# Patient Record
Sex: MALE | Race: WHITE | NOT HISPANIC OR LATINO | Employment: OTHER | URBAN - METROPOLITAN AREA
[De-identification: names, ages, dates, MRNs, and addresses within clinical notes are randomized per-mention and may not be internally consistent; named-entity substitution may affect disease eponyms.]

---

## 2017-03-23 ENCOUNTER — OFFICE VISIT - HEALTHEAST (OUTPATIENT)
Dept: INTERNAL MEDICINE | Facility: CLINIC | Age: 62
End: 2017-03-23

## 2017-03-23 ENCOUNTER — TRANSFERRED RECORDS (OUTPATIENT)
Dept: HEALTH INFORMATION MANAGEMENT | Facility: CLINIC | Age: 62
End: 2017-03-23

## 2017-03-23 DIAGNOSIS — I25.10 CORONARY ATHEROSCLEROSIS: ICD-10-CM

## 2017-03-23 DIAGNOSIS — H71.91 CHOLESTEATOMA, RIGHT: ICD-10-CM

## 2017-03-23 DIAGNOSIS — C64.9 MALIGNANT NEOPLASM OF KIDNEY EXCLUDING RENAL PELVIS (H): ICD-10-CM

## 2017-03-23 DIAGNOSIS — Z12.5 SPECIAL SCREENING FOR MALIGNANT NEOPLASM OF PROSTATE: ICD-10-CM

## 2017-03-23 DIAGNOSIS — Z00.00 ROUTINE GENERAL MEDICAL EXAMINATION AT A HEALTH CARE FACILITY: ICD-10-CM

## 2017-03-23 LAB
CHOLEST SERPL-MCNC: 114 MG/DL
FASTING STATUS PATIENT QL REPORTED: YES
HDLC SERPL-MCNC: 46 MG/DL
LDLC SERPL CALC-MCNC: 61 MG/DL
PSA SERPL-MCNC: 0.2 NG/ML (ref 0–4.5)
TRIGL SERPL-MCNC: 37 MG/DL

## 2017-03-23 ASSESSMENT — MIFFLIN-ST. JEOR: SCORE: 1634.02

## 2017-03-28 ENCOUNTER — PRE VISIT (OUTPATIENT)
Dept: OTOLARYNGOLOGY | Facility: CLINIC | Age: 62
End: 2017-03-28

## 2017-03-28 NOTE — TELEPHONE ENCOUNTER
1.  Date/reason for appt: 5/11/17 9:30AM - Lump in Ear & Hearing Loss  2.  Referring provider: self-referred  3.  Call to patient (Yes / No - short description): Yes, pt's wife Morena was transferred from call center  4.  Previous care at / records requested from:  [facility] -- NEED GABRIEL    Emailed GABRIEL form to pt's wife Morena: [email or web address]      ** Per pt's wife, lump was discovered at pt's last physical appt with Dr. Baltazar Tamayo just a few weeks ago.

## 2017-03-30 NOTE — TELEPHONE ENCOUNTER
Records received from [facility].   Included  Office notes: 3/23/17  Other: labs

## 2017-04-11 ENCOUNTER — RECORDS - HEALTHEAST (OUTPATIENT)
Dept: ADMINISTRATIVE | Facility: OTHER | Age: 62
End: 2017-04-11

## 2017-04-13 ENCOUNTER — COMMUNICATION - HEALTHEAST (OUTPATIENT)
Dept: INTERNAL MEDICINE | Facility: CLINIC | Age: 62
End: 2017-04-13

## 2017-04-13 DIAGNOSIS — N52.9 ED (ERECTILE DYSFUNCTION): ICD-10-CM

## 2017-04-18 ENCOUNTER — COMMUNICATION - HEALTHEAST (OUTPATIENT)
Dept: ADMINISTRATIVE | Facility: CLINIC | Age: 62
End: 2017-04-18

## 2017-04-24 ENCOUNTER — COMMUNICATION - HEALTHEAST (OUTPATIENT)
Dept: INTERNAL MEDICINE | Facility: CLINIC | Age: 62
End: 2017-04-24

## 2017-04-24 DIAGNOSIS — N52.9 ED (ERECTILE DYSFUNCTION): ICD-10-CM

## 2017-04-25 ENCOUNTER — AMBULATORY - HEALTHEAST (OUTPATIENT)
Dept: INTERNAL MEDICINE | Facility: CLINIC | Age: 62
End: 2017-04-25

## 2017-05-09 DIAGNOSIS — H91.90 HEARING LOSS: Primary | ICD-10-CM

## 2017-05-10 DIAGNOSIS — H90.8 MIXED HEARING LOSS: Primary | ICD-10-CM

## 2017-05-11 ENCOUNTER — OFFICE VISIT (OUTPATIENT)
Dept: OTOLARYNGOLOGY | Facility: CLINIC | Age: 62
End: 2017-05-11
Attending: OTOLARYNGOLOGY

## 2017-05-11 ENCOUNTER — OFFICE VISIT (OUTPATIENT)
Dept: AUDIOLOGY | Facility: CLINIC | Age: 62
End: 2017-05-11
Attending: OTOLARYNGOLOGY

## 2017-05-11 VITALS — BODY MASS INDEX: 27.25 KG/M2 | WEIGHT: 184 LBS | HEIGHT: 69 IN

## 2017-05-11 DIAGNOSIS — H90.6 MIXED HEARING LOSS, BILATERAL: ICD-10-CM

## 2017-05-11 DIAGNOSIS — H90.72 MIXED HEARING LOSS OF LEFT EAR: Primary | ICD-10-CM

## 2017-05-11 DIAGNOSIS — H90.5 SENSORINEURAL HEARING LOSS OF RIGHT EAR: ICD-10-CM

## 2017-05-11 DIAGNOSIS — H61.23 BILATERAL IMPACTED CERUMEN: Primary | ICD-10-CM

## 2017-05-11 ASSESSMENT — PAIN SCALES - GENERAL: PAINLEVEL: NO PAIN (0)

## 2017-05-11 NOTE — MR AVS SNAPSHOT
After Visit Summary   5/11/2017    Jose Lynn    MRN: [id number]           Patient Information     Date Of Birth          1955        Visit Information        Provider Department      5/11/2017 9:30 AM Amarjit Jordan MD [facility] Ear Nose and Throat        Today's Diagnoses     Bilateral impacted cerumen    -  1    Mixed hearing loss, bilateral          Care Instructions    The patient presents with a history of hearing loss and opacification of the right tympanic membrane. The patient will referred for a CT scan of the temporal bone to evaluate the right middle ear and he will be referred for hearing amplification as he feels necessary in the right ear. He will be seen again after his CT scan is completed. His ears are cleaned of cerumen today.         Follow-ups after your visit        Your next 10 appointments already scheduled     May 11, 2017 10:40 AM CDT   CT TEMPORAL ORBITAL SELLA W/O CONTRAST with UCCT1   [facility] CT ([facility])    [address] [place] [id number] [phone]           Please bring any scans or X-rays taken at other hospitals, if similar tests were done. Also bring a list of your medicines, including vitamins, minerals and over-the-counter drugs. It is safest to leave personal items at home.  Be sure to tell your doctor:   If you have any allergies.   If there s any chance you are pregnant.   If you are breastfeeding.   If you have any special needs.  You do not need to do anything special to prepare.  Please wear loose clothing, such as a sweat suit or jogging clothes. Avoid snaps, zippers and other metal. We may ask you to undress and put on a hospital gown.            May 25, 2017  7:45 AM CDT   (Arrive by 7:30 AM)   Return Visit with Amarjit Jordan MD   [facility] ([facility])    [address] [place] [address]  [id number] [phone]              Future tests that were ordered for you today     Open Future Orders        Priority Expected Expires Ordered    CT Temporal orbital sella w/o contrast Routine  2018            Who to contact     Please call your clinic at [phone] to:    Ask questions about your health    Make or cancel appointments    Discuss your medicines    Learn about your test results    Speak to your doctor   If you have compliments or concerns about an experience at your clinic, or if you wish to file a complaint, please contact [facility] Patient Relations at [phone] or email us at [email or web address]         Additional Information About Your Visit        NoteSickhart Information     YooDeal is an electronic gateway that provides easy, online access to your medical records. With YooDeal, you can request a clinic appointment, read your test results, renew a prescription or communicate with your care team.     To sign up for YooDeal visit the website at [email or web address]   You will be asked to enter the access code listed below, as well as some personal information. Please follow the directions to create your username and password.     Your access code is: [email or web address]  Expires: 2017  6:30 AM     Your access code will  in 90 days. If you need help or a new code, please contact your [facility] or call [phone] for assistance.        Care EveryWhere ID     This is your Care EveryWhere ID. This could be used by other organizations to access your [facility] medical records  [id number]        Your Vitals Were     Height BMI (Body Mass Index)                1.75 m (5' 8.9\") 27.25 kg/m2           Blood Pressure from Last 3 Encounters:   No data found for BP    Weight from Last 3 Encounters:   17 83.5 kg (184 lb)              We Performed the Following     AUDIO REVIEW/CONSULT        Primary " Care Provider    Referred MD Brandon       No address on file        Thank you!     Thank you for choosing [facility] EAR NOSE AND THROAT  for your care. Our goal is always to provide you with excellent care. Hearing back from our patients is one way we can continue to improve our services. Please take a few minutes to complete the written survey that you may receive in the mail after your visit with us. Thank you!             Your Updated Medication List - Protect others around you: Learn how to safely use, store and throw away your medicines at www.disposemymeds.org.          This list is accurate as of: 5/11/17 10:31 AM.  Always use your most recent med list.                   Brand Name Dispense Instructions for use    ASPIRIN EC PO      Take 162 mg by mouth daily       LIPITOR PO      Take 80 mg by mouth daily       LISINOPRIL PO      Take 5 mg by mouth daily       Multi-vitamin Tabs tablet      Take 1 tablet by mouth daily       NITROSTAT SL      Place 0.4 mg under the tongue       vitamin E 3000 UNIT/GM Crea

## 2017-05-11 NOTE — LETTER
Date:May 12, 2017      Patient was self referred, no letter generated. Do not send.        [facility] Health Information

## 2017-05-11 NOTE — LETTER
5/11/2017       RE: Jose Lynn  [address]  [place] [address]     Dear Colleague,    Thank you for referring your patient, Jose Lynn, to the [facility] EAR NOSE AND THROAT at [facility]. Please see a copy of my visit note below.    The patient presents with a history of hearing loss.  The patient reports a progressive hearing loss in both ears over at least the past few years.  The patient denies ear infections, otalgia, otorrhea, dizziness, or tinnitus.  The patient has no family history of hearing loss or otosclerosis.  The patient denies sinusitis, rhinitis, facial pain, nasal obstruction or purulent nasal discharge. The patient denies chronic or recurrent tonsillitis, chronic or recurrent pharyngitis.    The patient's Audiogram and Tympanogram are reviewed with him and they demonstrates mild to moderate sensorineural hearing loss worse in the right ear than in the left ear with 100% word recognition on the left and 96% word recognition on the right with slight negative pressure in the right ear on his tympanogram.     This patient is seen in consultation as a self referral to my clinic.     All other systems were reviewed and they are either negative or they are not directly pertinent to this Otolaryngology examination.      Past Medical History:    No past medical history on file.    Past Surgical History:    No past surgical history on file.    Medications:      Current Outpatient Prescriptions:      ASPIRIN EC PO, Take 162 mg by mouth daily, Disp: , Rfl:      LISINOPRIL PO, Take 5 mg by mouth daily, Disp: , Rfl:      Atorvastatin Calcium (LIPITOR PO), Take 80 mg by mouth daily, Disp: , Rfl:      multivitamin, therapeutic with minerals (MULTI-VITAMIN) TABS, Take 1 tablet by mouth daily, Disp: , Rfl:      vitamin E 3000 UNIT/GM CREA, , Disp: , Rfl:      Nitroglycerin (NITROSTAT SL), Place 0.4 mg under the tongue, Disp: , Rfl:     Allergies:    Review of patient's  allergies indicates no known allergies.    Physical Examination:    The patient is a well developed, well nourished male in no apparent distress.  He is normocephalic, atraumatic with pupils equally round and reactive to light.    Oral Cavity Examination:  Normal mucosa with no masses or lesions  Nasal Examination: Normal mucosa with no masses or lesions  Ear Examination: Ear canals are impacted with cerumen. The ear canals are cleaned of cerumen using an alligator forceps, a currette and a suction using a binocular microscope for visualization. The tympanic membrane on the left is normal and on the right is sclerotic. The middle ear space on the left is normal and on the right the middle ear cannot be assessed due to the sclerosis of the tympanic membrane.   Neurological Examination: Facial nerve function intact and symmetric  Integumentary Examination: No lesions on the skin of the head and neck  Neck Examination: No masses or lesions, no lymphadenopathy  Endocrine Examination: Normal thyroid examination    Assessment and Plan:    The patient presents with a history of hearing loss and opacification of the right tympanic membrane. The patient will referred for a CT scan of the temporal bone to evaluate the right middle ear and he will be referred for hearing amplification as he feels necessary in the right ear. He will be seen again after his CT scan is completed. His ears are cleaned of cerumen today.     Again, thank you for allowing me to participate in the care of your patient.      Sincerely,    Amarjit Jordan MD

## 2017-05-11 NOTE — PROGRESS NOTES
AUDIOLOGY REPORT    SUMMARY: Audiology visit completed. See audiogram for results.      RECOMMENDATIONS: Follow-up with ENT.    Gillian Hurst M.A.  Audiology Extern  Temporary License #[id number]    I was present with the patient for the entire Audiology appointment including all procedures/testing performed by the AuD student, and agree with the student s assessment and plan as documented.      Jaxson Munoz, [facility]  Licensed Audiologist  [place] #[id number]

## 2017-05-11 NOTE — NURSING NOTE
Chief Complaint   Patient presents with     Consult     growth in ear unspecified.     Chun Broussard LPN

## 2017-05-11 NOTE — PATIENT INSTRUCTIONS
The patient presents with a history of hearing loss and opacification of the right tympanic membrane. The patient will referred for a CT scan of the temporal bone to evaluate the right middle ear and he will be referred for hearing amplification as he feels necessary in the right ear. He will be seen again after his CT scan is completed. His ears are cleaned of cerumen today.

## 2017-05-11 NOTE — MR AVS SNAPSHOT
After Visit Summary   2017    Jose Lynn    MRN: [id number]           Patient Information     Date Of Birth          1955        Visit Information        Provider Department      2017 8:30 AM Jenny Milian AuD M [facility] Audiology        Today's Diagnoses     Mixed hearing loss of left ear    -  1    Sensorineural hearing loss of right ear           Follow-ups after your visit        Who to contact     Please call your clinic at [phone] to:    Ask questions about your health    Make or cancel appointments    Discuss your medicines    Learn about your test results    Speak to your doctor   If you have compliments or concerns about an experience at your clinic, or if you wish to file a complaint, please contact [facility] Patient Relations at [phone] or email us at [email or web address]         Additional Information About Your Visit        MyChart Information     Buyout is an electronic gateway that provides easy, online access to your medical records. With Immunome, you can request a clinic appointment, read your test results, renew a prescription or communicate with your care team.     To sign up for Buyout visit the website at [email or web address]   You will be asked to enter the access code listed below, as well as some personal information. Please follow the directions to create your username and password.     Your access code is: [email or web address]  Expires: 2017  6:30 AM     Your access code will  in 90 days. If you need help or a new code, please contact your [facility] or call [phone] for assistance.        Care EveryWhere ID     This is your Care EveryWhere ID. This could be used by other organizations to access your [facility] medical records  [id number]         Blood Pressure from Last 3 Encounters:   No data found for BP    Weight from Last 3 Encounters:   17 83.5 kg (184  lb)              We Performed the Following     [facility] Audiometry Thrshld Eval & Speech Recog (50570)     [place]   ([id number])     Tymps / Reflex   (11741)        Primary Care Provider    Referred Self, MD       No address on file        Thank you!     Thank you for choosing [facility] AUDIOLOGY  for your care. Our goal is always to provide you with excellent care. Hearing back from our patients is one way we can continue to improve our services. Please take a few minutes to complete the written survey that you may receive in the mail after your visit with us. Thank you!             Your Updated Medication List - Protect others around you: Learn how to safely use, store and throw away your medicines at www.disposemymeds.org.          This list is accurate as of: 5/11/17  9:39 AM.  Always use your most recent med list.                   Brand Name Dispense Instructions for use    ASPIRIN EC PO      Take 162 mg by mouth daily       LIPITOR PO      Take 80 mg by mouth daily       LISINOPRIL PO      Take 5 mg by mouth daily       Multi-vitamin Tabs tablet      Take 1 tablet by mouth daily       NITROSTAT SL      Place 0.4 mg under the tongue       vitamin E 3000 UNIT/GM Crea

## 2017-05-11 NOTE — PROGRESS NOTES
The patient presents with a history of hearing loss.  The patient reports a progressive hearing loss in both ears over at least the past few years.  The patient denies ear infections, otalgia, otorrhea, dizziness, or tinnitus.  The patient has no family history of hearing loss or otosclerosis.  The patient denies sinusitis, rhinitis, facial pain, nasal obstruction or purulent nasal discharge. The patient denies chronic or recurrent tonsillitis, chronic or recurrent pharyngitis.    The patient's Audiogram and Tympanogram are reviewed with him and they demonstrates mild to moderate sensorineural hearing loss worse in the right ear than in the left ear with 100% word recognition on the left and 96% word recognition on the right with slight negative pressure in the right ear on his tympanogram.     This patient is seen in consultation as a self referral to my clinic.     All other systems were reviewed and they are either negative or they are not directly pertinent to this Otolaryngology examination.      Past Medical History:    No past medical history on file.    Past Surgical History:    No past surgical history on file.    Medications:      Current Outpatient Prescriptions:      ASPIRIN EC PO, Take 162 mg by mouth daily, Disp: , Rfl:      LISINOPRIL PO, Take 5 mg by mouth daily, Disp: , Rfl:      Atorvastatin Calcium (LIPITOR PO), Take 80 mg by mouth daily, Disp: , Rfl:      multivitamin, therapeutic with minerals (MULTI-VITAMIN) TABS, Take 1 tablet by mouth daily, Disp: , Rfl:      vitamin E 3000 UNIT/GM CREA, , Disp: , Rfl:      Nitroglycerin (NITROSTAT SL), Place 0.4 mg under the tongue, Disp: , Rfl:     Allergies:    Review of patient's allergies indicates no known allergies.    Physical Examination:    The patient is a well developed, well nourished male in no apparent distress.  He is normocephalic, atraumatic with pupils equally round and reactive to light.    Oral Cavity Examination:  Normal mucosa with no  masses or lesions  Nasal Examination: Normal mucosa with no masses or lesions  Ear Examination: Ear canals are impacted with cerumen. The ear canals are cleaned of cerumen using an alligator forceps, a currette and a suction using a binocular microscope for visualization. The tympanic membrane on the left is normal and on the right is sclerotic. The middle ear space on the left is normal and on the right the middle ear cannot be assessed due to the sclerosis of the tympanic membrane.   Neurological Examination: Facial nerve function intact and symmetric  Integumentary Examination: No lesions on the skin of the head and neck  Neck Examination: No masses or lesions, no lymphadenopathy  Endocrine Examination: Normal thyroid examination    Assessment and Plan:    The patient presents with a history of hearing loss and opacification of the right tympanic membrane. The patient will referred for a CT scan of the temporal bone to evaluate the right middle ear and he will be referred for hearing amplification as he feels necessary in the right ear. He will be seen again after his CT scan is completed. His ears are cleaned of cerumen today.

## 2017-05-25 ENCOUNTER — OFFICE VISIT (OUTPATIENT)
Dept: OTOLARYNGOLOGY | Facility: CLINIC | Age: 62
End: 2017-05-25

## 2017-05-25 VITALS — HEIGHT: 70 IN | BODY MASS INDEX: 26.48 KG/M2 | WEIGHT: 185 LBS

## 2017-05-25 DIAGNOSIS — H90.5 SNHL (SENSORINEURAL HEARING LOSS): Primary | ICD-10-CM

## 2017-05-25 ASSESSMENT — PAIN SCALES - GENERAL: PAINLEVEL: NO PAIN (0)

## 2017-05-25 NOTE — PROGRESS NOTES
The patient presents with a history of hearing loss.  His CT scan demonstrates no middle ear disease with some mild thickening of the right tympanic membrane and these findings are reviewed with him.     The patient's Audiogram and Tympanogram are reviewed with him and they demonstrates mild to moderate sensorineural hearing loss worse in the right ear than in the left ear with 100% word recognition on the left and 96% word recognition on the right with slight negative pressure in the right ear on his tympanogram.       All other systems were reviewed and they are either negative or they are not directly pertinent to this Otolaryngology examination.      Past Medical History:    No past medical history on file.    Past Surgical History:    No past surgical history on file.    Medications:      Current Outpatient Prescriptions:      ASPIRIN EC PO, Take 162 mg by mouth daily, Disp: , Rfl:      LISINOPRIL PO, Take 5 mg by mouth daily, Disp: , Rfl:      Atorvastatin Calcium (LIPITOR PO), Take 80 mg by mouth daily, Disp: , Rfl:      multivitamin, therapeutic with minerals (MULTI-VITAMIN) TABS, Take 1 tablet by mouth daily, Disp: , Rfl:      vitamin E 3000 UNIT/GM CREA, , Disp: , Rfl:      Nitroglycerin (NITROSTAT SL), Place 0.4 mg under the tongue, Disp: , Rfl:     Allergies:    Review of patient's allergies indicates no known allergies.    Physical Examination:    The patient is a well developed, well nourished male in no apparent distress.  He is normocephalic, atraumatic with pupils equally round and reactive to light.    Oral Cavity Examination:  Normal mucosa with no masses or lesions  Nasal Examination: Normal mucosa with no masses or lesions  Ear Examination: Ear canals are clear.  The tympanic membrane on the left is normal and on the right is sclerotic. The middle ear space on the left is normal and on the right the middle ear cannot be assessed due to the sclerosis of the tympanic membrane.   Neurological  Examination: Facial nerve function intact and symmetric  Integumentary Examination: No lesions on the skin of the head and neck    Assessment and Plan:    The patient presents with a history of hearing loss and opacification of the right tympanic membrane. The patient's CT scan demonstrates no middle ear pathology. The patient will use acetic acid/vinegar/alcohol ear rinses and he will be seen again in one year or sooner if needed.

## 2017-05-25 NOTE — LETTER
Date:Azalea 3, 2017      Patient was self referred, no letter generated. Do not send.        [facility] Health Information

## 2017-05-25 NOTE — PATIENT INSTRUCTIONS
The patient presents with a history of hearing loss and opacification of the right tympanic membrane. The patient's CT scan demonstrates no middle ear pathology. The patient will use acetic acid/vinegar/alcohol ear rinses and he will be seen again in one year or sooner if needed.

## 2017-05-25 NOTE — LETTER
5/25/2017       RE: Jose Lynn  [address]  [place] [address]     Dear Colleague,    Thank you for referring your patient, Jose Lynn, to the [facility] EAR NOSE AND THROAT at [facility]. Please see a copy of my visit note below.    The patient presents with a history of hearing loss.  His CT scan demonstrates no middle ear disease with some mild thickening of the right tympanic membrane and these findings are reviewed with him.     The patient's Audiogram and Tympanogram are reviewed with him and they demonstrates mild to moderate sensorineural hearing loss worse in the right ear than in the left ear with 100% word recognition on the left and 96% word recognition on the right with slight negative pressure in the right ear on his tympanogram.       All other systems were reviewed and they are either negative or they are not directly pertinent to this Otolaryngology examination.      Past Medical History:    No past medical history on file.    Past Surgical History:    No past surgical history on file.    Medications:      Current Outpatient Prescriptions:      ASPIRIN EC PO, Take 162 mg by mouth daily, Disp: , Rfl:      LISINOPRIL PO, Take 5 mg by mouth daily, Disp: , Rfl:      Atorvastatin Calcium (LIPITOR PO), Take 80 mg by mouth daily, Disp: , Rfl:      multivitamin, therapeutic with minerals (MULTI-VITAMIN) TABS, Take 1 tablet by mouth daily, Disp: , Rfl:      vitamin E 3000 UNIT/GM CREA, , Disp: , Rfl:      Nitroglycerin (NITROSTAT SL), Place 0.4 mg under the tongue, Disp: , Rfl:     Allergies:    Review of patient's allergies indicates no known allergies.    Physical Examination:    The patient is a well developed, well nourished male in no apparent distress.  He is normocephalic, atraumatic with pupils equally round and reactive to light.    Oral Cavity Examination:  Normal mucosa with no masses or lesions  Nasal Examination: Normal mucosa with no masses or  lesions  Ear Examination: Ear canals are clear.  The tympanic membrane on the left is normal and on the right is sclerotic. The middle ear space on the left is normal and on the right the middle ear cannot be assessed due to the sclerosis of the tympanic membrane.   Neurological Examination: Facial nerve function intact and symmetric  Integumentary Examination: No lesions on the skin of the head and neck    Assessment and Plan:    The patient presents with a history of hearing loss and opacification of the right tympanic membrane. The patient's CT scan demonstrates no middle ear pathology. The patient will use acetic acid/vinegar/alcohol ear rinses and he will be seen again in one year or sooner if needed.     Again, thank you for allowing me to participate in the care of your patient.      Sincerely,    Amarjit Jordan MD

## 2017-05-25 NOTE — MR AVS SNAPSHOT
After Visit Summary   2017    Jose Lynn    MRN: [id number]           Patient Information     Date Of Birth          1955        Visit Information        Provider Department      2017 7:45 AM Amarjit Jordan MD [facility] Ear Nose [facility] Throat        Today's Diagnoses     SNHL (sensorineural hearing loss)    -  1      Care Instructions    The patient presents with a history of hearing loss and opacification of the right tympanic membrane. The patient's CT scan demonstrates no middle ear pathology. The patient will use acetic acid/vinegar/alcohol ear rinses and he will be seen again in one year or sooner if needed.           Follow-ups after your visit        Who to contact     Please call your clinic at [phone] to:    Ask questions about your health    Make or cancel appointments    Discuss your medicines    Learn about your test results    Speak to your doctor   If you have compliments or concerns about an experience at your clinic, or if you wish to file a complaint, please contact [facility] Patient Relations at [phone] or email us at [email or web address]         Additional Information About Your Visit        MyChart Information     [employer] is an electronic gateway that provides easy, online access to your medical records. With Social IQ (Social Influence Quotient), you can request a clinic appointment, read your test results, renew a prescription or communicate with your care team.     To sign up for [employer] visit the website at [email or web address]   You will be asked to enter the access code listed below, as well as some personal information. Please follow the directions to create your username and password.     Your access code is: [email or web address]  Expires: 2017  6:30 AM     Your access code will  in 90 days. If you need help or a new code, please contact your [facility] or call [phone] for assistance.    "     Care EveryWhere ID     This is your Care EveryWhere ID. This could be used by other organizations to access your [facility] medical records  [id number]        Your Vitals Were     Height BMI (Body Mass Index)                1.78 m (5' 10.08\") 26.48 kg/m2           Blood Pressure from Last 3 Encounters:   No data found for BP    Weight from Last 3 Encounters:   05/25/17 83.9 kg (185 lb)   05/11/17 83.5 kg (184 lb)              Today, you had the following     No orders found for display       Primary Care Provider    Referred MD Brandon       No address on file        Thank you!     Thank you for choosing [facility] EAR NOSE AND THROAT  for your care. Our goal is always to provide you with excellent care. Hearing back from our patients is one way we can continue to improve our services. Please take a few minutes to complete the written survey that you may receive in the mail after your visit with us. Thank you!             Your Updated Medication List - Protect others around you: Learn how to safely use, store and throw away your medicines at www.disposemymeds.org.          This list is accurate as of: 5/25/17  7:46 AM.  Always use your most recent med list.                   Brand Name Dispense Instructions for use    ASPIRIN EC PO      Take 162 mg by mouth daily       LIPITOR PO      Take 80 mg by mouth daily       LISINOPRIL PO      Take 5 mg by mouth daily       Multi-vitamin Tabs tablet      Take 1 tablet by mouth daily       NITROSTAT SL      Place 0.4 mg under the tongue       vitamin E 3000 UNIT/GM Crea            "

## 2017-05-31 ENCOUNTER — COMMUNICATION - HEALTHEAST (OUTPATIENT)
Dept: INTERNAL MEDICINE | Facility: CLINIC | Age: 62
End: 2017-05-31

## 2017-06-02 ENCOUNTER — COMMUNICATION - HEALTHEAST (OUTPATIENT)
Dept: INTERNAL MEDICINE | Facility: CLINIC | Age: 62
End: 2017-06-02

## 2017-06-05 ENCOUNTER — COMMUNICATION - HEALTHEAST (OUTPATIENT)
Dept: INTERNAL MEDICINE | Facility: CLINIC | Age: 62
End: 2017-06-05

## 2017-06-05 DIAGNOSIS — I25.10 CAD (CORONARY ARTERY DISEASE): ICD-10-CM

## 2017-09-28 ENCOUNTER — COMMUNICATION - HEALTHEAST (OUTPATIENT)
Dept: INTERNAL MEDICINE | Facility: CLINIC | Age: 62
End: 2017-09-28

## 2017-09-28 DIAGNOSIS — E78.5 HYPERLIPIDEMIA: ICD-10-CM

## 2018-01-25 ENCOUNTER — COMMUNICATION - HEALTHEAST (OUTPATIENT)
Dept: ADMINISTRATIVE | Facility: CLINIC | Age: 63
End: 2018-01-25

## 2018-02-16 ENCOUNTER — COMMUNICATION - HEALTHEAST (OUTPATIENT)
Dept: SCHEDULING | Facility: CLINIC | Age: 63
End: 2018-02-16

## 2018-04-16 ENCOUNTER — OFFICE VISIT - HEALTHEAST (OUTPATIENT)
Dept: INTERNAL MEDICINE | Facility: CLINIC | Age: 63
End: 2018-04-16

## 2018-04-16 ENCOUNTER — COMMUNICATION - HEALTHEAST (OUTPATIENT)
Dept: CARDIOLOGY | Facility: CLINIC | Age: 63
End: 2018-04-16

## 2018-04-16 DIAGNOSIS — Z00.00 ROUTINE GENERAL MEDICAL EXAMINATION AT A HEALTH CARE FACILITY: ICD-10-CM

## 2018-04-16 DIAGNOSIS — C64.9 MALIGNANT NEOPLASM OF KIDNEY EXCLUDING RENAL PELVIS (H): ICD-10-CM

## 2018-04-16 DIAGNOSIS — I25.10 CORONARY ATHEROSCLEROSIS: ICD-10-CM

## 2018-04-16 DIAGNOSIS — D64.9 ANEMIA: ICD-10-CM

## 2018-04-16 DIAGNOSIS — Z12.5 SPECIAL SCREENING FOR MALIGNANT NEOPLASM OF PROSTATE: ICD-10-CM

## 2018-04-16 DIAGNOSIS — R00.1 BRADYCARDIA: ICD-10-CM

## 2018-04-16 DIAGNOSIS — Z11.59 NEED FOR HEPATITIS C SCREENING TEST: ICD-10-CM

## 2018-04-16 DIAGNOSIS — Z13.228 SCREENING FOR METABOLIC DISORDER: ICD-10-CM

## 2018-04-16 LAB
ALBUMIN SERPL-MCNC: 3.7 G/DL (ref 3.5–5)
ALBUMIN UR-MCNC: NEGATIVE MG/DL
ALP SERPL-CCNC: 73 U/L (ref 45–120)
ALT SERPL W P-5'-P-CCNC: 27 U/L (ref 0–45)
ANION GAP SERPL CALCULATED.3IONS-SCNC: 10 MMOL/L (ref 5–18)
APPEARANCE UR: CLEAR
AST SERPL W P-5'-P-CCNC: 28 U/L (ref 0–40)
BILIRUB SERPL-MCNC: 0.7 MG/DL (ref 0–1)
BILIRUB UR QL STRIP: NEGATIVE
BUN SERPL-MCNC: 15 MG/DL (ref 8–22)
CALCIUM SERPL-MCNC: 9.5 MG/DL (ref 8.5–10.5)
CHLORIDE BLD-SCNC: 105 MMOL/L (ref 98–107)
CHOLEST SERPL-MCNC: 128 MG/DL
CO2 SERPL-SCNC: 26 MMOL/L (ref 22–31)
COLOR UR AUTO: YELLOW
CREAT SERPL-MCNC: 0.8 MG/DL (ref 0.7–1.3)
ERYTHROCYTE [DISTWIDTH] IN BLOOD BY AUTOMATED COUNT: 12.8 % (ref 11–14.5)
FASTING STATUS PATIENT QL REPORTED: YES
FERRITIN SERPL-MCNC: 40 NG/ML (ref 27–300)
GFR SERPL CREATININE-BSD FRML MDRD: >60 ML/MIN/1.73M2
GLUCOSE BLD-MCNC: 89 MG/DL (ref 70–125)
GLUCOSE UR STRIP-MCNC: NEGATIVE MG/DL
HCT VFR BLD AUTO: 38.1 % (ref 40–54)
HDLC SERPL-MCNC: 45 MG/DL
HGB BLD-MCNC: 13 G/DL (ref 14–18)
HGB UR QL STRIP: NEGATIVE
IRON SATN MFR SERPL: 22 % (ref 20–50)
IRON SERPL-MCNC: 66 UG/DL (ref 42–175)
KETONES UR STRIP-MCNC: NEGATIVE MG/DL
LDLC SERPL CALC-MCNC: 73 MG/DL
LEUKOCYTE ESTERASE UR QL STRIP: NEGATIVE
MCH RBC QN AUTO: 30.4 PG (ref 27–34)
MCHC RBC AUTO-ENTMCNC: 34.1 G/DL (ref 32–36)
MCV RBC AUTO: 89 FL (ref 80–100)
NITRATE UR QL: NEGATIVE
PH UR STRIP: 7 [PH] (ref 5–8)
PLATELET # BLD AUTO: 193 THOU/UL (ref 140–440)
PMV BLD AUTO: 7.7 FL (ref 7–10)
POTASSIUM BLD-SCNC: 4.3 MMOL/L (ref 3.5–5)
PROT SERPL-MCNC: 7 G/DL (ref 6–8)
PSA SERPL-MCNC: 0.2 NG/ML (ref 0–4.5)
RBC # BLD AUTO: 4.26 MILL/UL (ref 4.4–6.2)
SODIUM SERPL-SCNC: 141 MMOL/L (ref 136–145)
SP GR UR STRIP: 1.01 (ref 1–1.03)
TIBC SERPL-MCNC: 305 UG/DL (ref 313–563)
TRANSFERRIN SERPL-MCNC: 244 MG/DL (ref 212–360)
TRIGL SERPL-MCNC: 49 MG/DL
TSH SERPL DL<=0.005 MIU/L-ACNC: 1.35 UIU/ML (ref 0.3–5)
UROBILINOGEN UR STRIP-ACNC: NORMAL
VIT B12 SERPL-MCNC: 787 PG/ML (ref 213–816)
WBC: 4.8 THOU/UL (ref 4–11)

## 2018-04-16 ASSESSMENT — MIFFLIN-ST. JEOR: SCORE: 1611.33

## 2018-04-17 LAB
25(OH)D3 SERPL-MCNC: 51.2 NG/ML (ref 30–80)
25(OH)D3 SERPL-MCNC: 51.2 NG/ML (ref 30–80)
HCV AB SERPL QL IA: NEGATIVE

## 2018-06-19 ENCOUNTER — COMMUNICATION - HEALTHEAST (OUTPATIENT)
Dept: INTERNAL MEDICINE | Facility: CLINIC | Age: 63
End: 2018-06-19

## 2018-06-19 ENCOUNTER — HOSPITAL ENCOUNTER (OUTPATIENT)
Dept: CARDIOLOGY | Facility: HOSPITAL | Age: 63
Discharge: HOME | End: 2018-06-19
Attending: INTERNAL MEDICINE

## 2018-06-19 DIAGNOSIS — I25.84 CORONARY ARTERY DISEASE DUE TO CALCIFIED CORONARY LESION: ICD-10-CM

## 2018-06-19 DIAGNOSIS — R94.39 POSITIVE CARDIAC STRESS TEST: ICD-10-CM

## 2018-06-19 DIAGNOSIS — I25.10 CORONARY ATHEROSCLEROSIS: ICD-10-CM

## 2018-06-19 DIAGNOSIS — I25.10 CORONARY ARTERY DISEASE DUE TO CALCIFIED CORONARY LESION: ICD-10-CM

## 2018-06-19 LAB
CV STRESS CURRENT BP HE: NORMAL
CV STRESS CURRENT HR HE: 100
CV STRESS CURRENT HR HE: 107
CV STRESS CURRENT HR HE: 117
CV STRESS CURRENT HR HE: 121
CV STRESS CURRENT HR HE: 123
CV STRESS CURRENT HR HE: 125
CV STRESS CURRENT HR HE: 126
CV STRESS CURRENT HR HE: 131
CV STRESS CURRENT HR HE: 134
CV STRESS CURRENT HR HE: 134
CV STRESS CURRENT HR HE: 135
CV STRESS CURRENT HR HE: 136
CV STRESS CURRENT HR HE: 142
CV STRESS CURRENT HR HE: 158
CV STRESS CURRENT HR HE: 160
CV STRESS CURRENT HR HE: 166
CV STRESS CURRENT HR HE: 58
CV STRESS CURRENT HR HE: 61
CV STRESS CURRENT HR HE: 68
CV STRESS CURRENT HR HE: 69
CV STRESS CURRENT HR HE: 72
CV STRESS CURRENT HR HE: 73
CV STRESS CURRENT HR HE: 77
CV STRESS CURRENT HR HE: 80
CV STRESS CURRENT HR HE: 84
CV STRESS CURRENT HR HE: 85
CV STRESS CURRENT HR HE: 87
CV STRESS CURRENT HR HE: 88
CV STRESS CURRENT HR HE: 90
CV STRESS CURRENT HR HE: 98
CV STRESS CURRENT HR HE: 98
CV STRESS DEVIATION TIME HE: NORMAL
CV STRESS ECHO PERCENT HR HE: NORMAL
CV STRESS EXERCISE STAGE HE: NORMAL
CV STRESS FINAL RESTING BP HE: NORMAL
CV STRESS FINAL RESTING HR HE: 77
CV STRESS MAX HR HE: 167
CV STRESS MAX TREADMILL GRADE HE: 18
CV STRESS MAX TREADMILL SPEED HE: 5
CV STRESS PEAK DIA BP HE: NORMAL
CV STRESS PEAK SYS BP HE: NORMAL
CV STRESS PHASE HE: NORMAL
CV STRESS PROTOCOL HE: NORMAL
CV STRESS RESTING PT POSITION HE: NORMAL
CV STRESS RESTING PT POSITION HE: NORMAL
CV STRESS ST DEVIATION AMOUNT HE: NORMAL
CV STRESS ST DEVIATION ELEVATION HE: NORMAL
CV STRESS ST EVELATION AMOUNT HE: NORMAL
CV STRESS TEST TYPE HE: NORMAL
CV STRESS TOTAL STAGE TIME MIN 1 HE: NORMAL
STRESS ECHO BASELINE BP: NORMAL
STRESS ECHO BASELINE HR: 58
STRESS ECHO CALCULATED PERCENT HR: 106 %
STRESS ECHO LAST STRESS BP: NORMAL
STRESS ECHO LAST STRESS HR: 166
STRESS ECHO POST ESTIMATED WORKLOAD: 13.7
STRESS ECHO POST EXERCISE DUR MIN: 13
STRESS ECHO POST EXERCISE DUR SEC: 15
STRESS ECHO TARGET HR: 133

## 2018-06-21 ENCOUNTER — SURGERY - HEALTHEAST (OUTPATIENT)
Dept: CARDIOLOGY | Facility: CLINIC | Age: 63
End: 2018-06-21

## 2018-06-21 ENCOUNTER — OFFICE VISIT - HEALTHEAST (OUTPATIENT)
Dept: CARDIOLOGY | Facility: CLINIC | Age: 63
End: 2018-06-21

## 2018-06-21 ENCOUNTER — AMBULATORY - HEALTHEAST (OUTPATIENT)
Dept: CARDIOLOGY | Facility: CLINIC | Age: 63
End: 2018-06-21

## 2018-06-21 DIAGNOSIS — I25.118 CORONARY ARTERY DISEASE INVOLVING NATIVE CORONARY ARTERY OF NATIVE HEART WITH OTHER FORM OF ANGINA PECTORIS (H): ICD-10-CM

## 2018-06-21 DIAGNOSIS — R94.39 ABNORMAL STRESS ECHOCARDIOGRAM: ICD-10-CM

## 2018-06-21 DIAGNOSIS — E78.5 DYSLIPIDEMIA, GOAL LDL BELOW 70: ICD-10-CM

## 2018-06-21 ASSESSMENT — MIFFLIN-ST. JEOR: SCORE: 1590.25

## 2018-06-22 ENCOUNTER — SURGERY - HEALTHEAST (OUTPATIENT)
Dept: CARDIOLOGY | Facility: CLINIC | Age: 63
End: 2018-06-22

## 2018-06-22 ASSESSMENT — MIFFLIN-ST. JEOR: SCORE: 1611.8

## 2018-08-23 ENCOUNTER — COMMUNICATION - HEALTHEAST (OUTPATIENT)
Dept: CARDIOLOGY | Facility: CLINIC | Age: 63
End: 2018-08-23

## 2018-08-23 DIAGNOSIS — N52.9 ED (ERECTILE DYSFUNCTION): ICD-10-CM

## 2018-09-13 ENCOUNTER — COMMUNICATION - HEALTHEAST (OUTPATIENT)
Dept: INTERNAL MEDICINE | Facility: CLINIC | Age: 63
End: 2018-09-13

## 2018-09-13 DIAGNOSIS — E78.5 HYPERLIPIDEMIA: ICD-10-CM

## 2018-11-27 ENCOUNTER — COMMUNICATION - HEALTHEAST (OUTPATIENT)
Dept: CARE COORDINATION | Facility: CLINIC | Age: 63
End: 2018-11-27

## 2018-11-30 ENCOUNTER — HOSPITAL ENCOUNTER (OUTPATIENT)
Dept: CARDIOLOGY | Facility: CLINIC | Age: 63
Discharge: HOME | End: 2018-11-30
Attending: INTERNAL MEDICINE

## 2018-11-30 ENCOUNTER — OFFICE VISIT - HEALTHEAST (OUTPATIENT)
Dept: INTERNAL MEDICINE | Facility: CLINIC | Age: 63
End: 2018-11-30

## 2018-11-30 DIAGNOSIS — I48.0 PAROXYSMAL ATRIAL FIBRILLATION (H): ICD-10-CM

## 2018-11-30 DIAGNOSIS — Z12.11 SCREEN FOR COLON CANCER: ICD-10-CM

## 2018-12-19 ENCOUNTER — COMMUNICATION - HEALTHEAST (OUTPATIENT)
Dept: INTERNAL MEDICINE | Facility: CLINIC | Age: 63
End: 2018-12-19

## 2018-12-19 DIAGNOSIS — E78.5 HYPERLIPIDEMIA: ICD-10-CM

## 2019-01-07 ENCOUNTER — OFFICE VISIT - HEALTHEAST (OUTPATIENT)
Dept: FAMILY MEDICINE | Facility: CLINIC | Age: 64
End: 2019-01-07

## 2019-01-07 DIAGNOSIS — J01.40 ACUTE NON-RECURRENT PANSINUSITIS: ICD-10-CM

## 2019-01-26 ENCOUNTER — COMMUNICATION - HEALTHEAST (OUTPATIENT)
Dept: INTERNAL MEDICINE | Facility: CLINIC | Age: 64
End: 2019-01-26

## 2019-01-26 DIAGNOSIS — E78.5 HYPERLIPIDEMIA: ICD-10-CM

## 2019-02-15 ENCOUNTER — OFFICE VISIT - HEALTHEAST (OUTPATIENT)
Dept: INTERNAL MEDICINE | Facility: CLINIC | Age: 64
End: 2019-02-15

## 2019-02-15 DIAGNOSIS — I48.0 PAROXYSMAL ATRIAL FIBRILLATION (H): ICD-10-CM

## 2019-02-15 DIAGNOSIS — I25.84 CORONARY ARTERY DISEASE DUE TO CALCIFIED CORONARY LESION: ICD-10-CM

## 2019-02-15 DIAGNOSIS — Z12.11 SCREEN FOR COLON CANCER: ICD-10-CM

## 2019-02-15 DIAGNOSIS — Z51.81 MEDICATION MONITORING ENCOUNTER: ICD-10-CM

## 2019-02-15 DIAGNOSIS — I25.10 CORONARY ARTERY DISEASE DUE TO CALCIFIED CORONARY LESION: ICD-10-CM

## 2019-02-15 DIAGNOSIS — E78.00 HYPERCHOLESTEROLEMIA: ICD-10-CM

## 2019-02-15 LAB
ALBUMIN SERPL-MCNC: 3.9 G/DL (ref 3.5–5)
ALP SERPL-CCNC: 63 U/L (ref 45–120)
ALT SERPL W P-5'-P-CCNC: 21 U/L (ref 0–45)
ANION GAP SERPL CALCULATED.3IONS-SCNC: 7 MMOL/L (ref 5–18)
AST SERPL W P-5'-P-CCNC: 25 U/L (ref 0–40)
BILIRUB SERPL-MCNC: 0.9 MG/DL (ref 0–1)
BUN SERPL-MCNC: 17 MG/DL (ref 8–22)
CALCIUM SERPL-MCNC: 9.5 MG/DL (ref 8.5–10.5)
CHLORIDE BLD-SCNC: 105 MMOL/L (ref 98–107)
CO2 SERPL-SCNC: 28 MMOL/L (ref 22–31)
CREAT SERPL-MCNC: 0.87 MG/DL (ref 0.7–1.3)
GFR SERPL CREATININE-BSD FRML MDRD: >60 ML/MIN/1.73M2
GLUCOSE BLD-MCNC: 78 MG/DL (ref 70–125)
POTASSIUM BLD-SCNC: 4.4 MMOL/L (ref 3.5–5)
PROT SERPL-MCNC: 6.5 G/DL (ref 6–8)
SODIUM SERPL-SCNC: 140 MMOL/L (ref 136–145)

## 2019-02-18 ENCOUNTER — COMMUNICATION - HEALTHEAST (OUTPATIENT)
Dept: INTERNAL MEDICINE | Facility: CLINIC | Age: 64
End: 2019-02-18

## 2019-03-18 ENCOUNTER — COMMUNICATION - HEALTHEAST (OUTPATIENT)
Dept: INTERNAL MEDICINE | Facility: CLINIC | Age: 64
End: 2019-03-18

## 2019-03-20 ENCOUNTER — RECORDS - HEALTHEAST (OUTPATIENT)
Dept: GENERAL RADIOLOGY | Facility: CLINIC | Age: 64
End: 2019-03-20

## 2019-03-20 ENCOUNTER — OFFICE VISIT - HEALTHEAST (OUTPATIENT)
Dept: FAMILY MEDICINE | Facility: CLINIC | Age: 64
End: 2019-03-20

## 2019-03-20 DIAGNOSIS — R07.81 PLEURODYNIA: ICD-10-CM

## 2019-03-20 DIAGNOSIS — R07.81 RIB PAIN ON LEFT SIDE: ICD-10-CM

## 2019-03-20 DIAGNOSIS — S29.012A MUSCLE STRAIN OF LEFT UPPER BACK, INITIAL ENCOUNTER: ICD-10-CM

## 2019-03-20 DIAGNOSIS — S22.42XA CLOSED FRACTURE OF MULTIPLE RIBS OF LEFT SIDE, INITIAL ENCOUNTER: ICD-10-CM

## 2019-03-28 ENCOUNTER — COMMUNICATION - HEALTHEAST (OUTPATIENT)
Dept: INTERNAL MEDICINE | Facility: CLINIC | Age: 64
End: 2019-03-28

## 2019-04-18 ENCOUNTER — OFFICE VISIT - HEALTHEAST (OUTPATIENT)
Dept: CARDIOLOGY | Facility: CLINIC | Age: 64
End: 2019-04-18

## 2019-04-18 DIAGNOSIS — I25.10 CORONARY ARTERY DISEASE INVOLVING NATIVE CORONARY ARTERY OF NATIVE HEART WITHOUT ANGINA PECTORIS: ICD-10-CM

## 2019-04-18 ASSESSMENT — MIFFLIN-ST. JEOR: SCORE: 1632.89

## 2019-05-11 ENCOUNTER — COMMUNICATION - HEALTHEAST (OUTPATIENT)
Dept: INTERNAL MEDICINE | Facility: CLINIC | Age: 64
End: 2019-05-11

## 2019-05-13 ENCOUNTER — COMMUNICATION - HEALTHEAST (OUTPATIENT)
Dept: INTERNAL MEDICINE | Facility: CLINIC | Age: 64
End: 2019-05-13

## 2019-05-13 DIAGNOSIS — E78.5 HYPERLIPIDEMIA: ICD-10-CM

## 2019-06-04 ENCOUNTER — COMMUNICATION - HEALTHEAST (OUTPATIENT)
Dept: INTERNAL MEDICINE | Facility: CLINIC | Age: 64
End: 2019-06-04

## 2019-06-04 ENCOUNTER — OFFICE VISIT - HEALTHEAST (OUTPATIENT)
Dept: INTERNAL MEDICINE | Facility: CLINIC | Age: 64
End: 2019-06-04

## 2019-06-04 DIAGNOSIS — Z51.81 MEDICATION MONITORING ENCOUNTER: ICD-10-CM

## 2019-06-04 DIAGNOSIS — I25.84 CORONARY ARTERY DISEASE DUE TO CALCIFIED CORONARY LESION: ICD-10-CM

## 2019-06-04 DIAGNOSIS — I48.0 PAROXYSMAL ATRIAL FIBRILLATION (H): ICD-10-CM

## 2019-06-04 DIAGNOSIS — I25.10 CORONARY ARTERY DISEASE DUE TO CALCIFIED CORONARY LESION: ICD-10-CM

## 2019-06-04 DIAGNOSIS — E78.00 HYPERCHOLESTEROLEMIA: ICD-10-CM

## 2019-06-04 DIAGNOSIS — Z12.11 SCREEN FOR COLON CANCER: ICD-10-CM

## 2019-06-04 DIAGNOSIS — Z00.00 HEALTHCARE MAINTENANCE: ICD-10-CM

## 2019-06-04 DIAGNOSIS — Z85.828 HISTORY OF SKIN CANCER: ICD-10-CM

## 2019-06-04 DIAGNOSIS — Z12.5 SCREENING FOR PROSTATE CANCER: ICD-10-CM

## 2019-06-04 DIAGNOSIS — H93.19 TINNITUS, UNSPECIFIED LATERALITY: ICD-10-CM

## 2019-06-04 LAB
ALBUMIN SERPL-MCNC: 4.1 G/DL (ref 3.5–5)
ALP SERPL-CCNC: 75 U/L (ref 45–120)
ALT SERPL W P-5'-P-CCNC: 29 U/L (ref 0–45)
ANION GAP SERPL CALCULATED.3IONS-SCNC: 7 MMOL/L (ref 5–18)
AST SERPL W P-5'-P-CCNC: 28 U/L (ref 0–40)
BILIRUB SERPL-MCNC: 0.9 MG/DL (ref 0–1)
BUN SERPL-MCNC: 14 MG/DL (ref 8–22)
CALCIUM SERPL-MCNC: 9.9 MG/DL (ref 8.5–10.5)
CHLORIDE BLD-SCNC: 105 MMOL/L (ref 98–107)
CHOLEST SERPL-MCNC: 128 MG/DL
CO2 SERPL-SCNC: 29 MMOL/L (ref 22–31)
CREAT SERPL-MCNC: 0.85 MG/DL (ref 0.7–1.3)
ERYTHROCYTE [DISTWIDTH] IN BLOOD BY AUTOMATED COUNT: 12.3 % (ref 11–14.5)
FASTING STATUS PATIENT QL REPORTED: YES
GFR SERPL CREATININE-BSD FRML MDRD: >60 ML/MIN/1.73M2
GLUCOSE BLD-MCNC: 91 MG/DL (ref 70–125)
HCT VFR BLD AUTO: 38 % (ref 40–54)
HDLC SERPL-MCNC: 52 MG/DL
HGB BLD-MCNC: 12.7 G/DL (ref 14–18)
LDLC SERPL CALC-MCNC: 66 MG/DL
MCH RBC QN AUTO: 30.3 PG (ref 27–34)
MCHC RBC AUTO-ENTMCNC: 33.5 G/DL (ref 32–36)
MCV RBC AUTO: 91 FL (ref 80–100)
PLATELET # BLD AUTO: 184 THOU/UL (ref 140–440)
PMV BLD AUTO: 7.3 FL (ref 7–10)
POTASSIUM BLD-SCNC: 4.3 MMOL/L (ref 3.5–5)
PROT SERPL-MCNC: 6.8 G/DL (ref 6–8)
PSA SERPL-MCNC: 0.1 NG/ML (ref 0–4.5)
RBC # BLD AUTO: 4.19 MILL/UL (ref 4.4–6.2)
SODIUM SERPL-SCNC: 141 MMOL/L (ref 136–145)
TRIGL SERPL-MCNC: 49 MG/DL
WBC: 4.4 THOU/UL (ref 4–11)

## 2019-06-04 ASSESSMENT — MIFFLIN-ST. JEOR: SCORE: 1604.2

## 2019-07-02 ENCOUNTER — RECORDS - HEALTHEAST (OUTPATIENT)
Dept: ADMINISTRATIVE | Facility: OTHER | Age: 64
End: 2019-07-02

## 2019-07-02 ENCOUNTER — COMMUNICATION - HEALTHEAST (OUTPATIENT)
Dept: SCHEDULING | Facility: CLINIC | Age: 64
End: 2019-07-02

## 2019-07-19 ENCOUNTER — TRANSFERRED RECORDS (OUTPATIENT)
Dept: HEALTH INFORMATION MANAGEMENT | Facility: CLINIC | Age: 64
End: 2019-07-19

## 2019-07-19 ENCOUNTER — RECORDS - HEALTHEAST (OUTPATIENT)
Dept: ADMINISTRATIVE | Facility: OTHER | Age: 64
End: 2019-07-19

## 2019-09-09 ENCOUNTER — TRANSFERRED RECORDS (OUTPATIENT)
Dept: HEALTH INFORMATION MANAGEMENT | Facility: CLINIC | Age: 64
End: 2019-09-09

## 2019-10-08 ENCOUNTER — COMMUNICATION - HEALTHEAST (OUTPATIENT)
Dept: INTERNAL MEDICINE | Facility: CLINIC | Age: 64
End: 2019-10-08

## 2019-10-18 ENCOUNTER — OFFICE VISIT - HEALTHEAST (OUTPATIENT)
Dept: INTERNAL MEDICINE | Facility: CLINIC | Age: 64
End: 2019-10-18

## 2019-10-18 DIAGNOSIS — I25.84 CORONARY ATHEROSCLEROSIS DUE TO CALCIFIED CORONARY LESION: ICD-10-CM

## 2019-10-18 DIAGNOSIS — C43.30 MALIGNANT MELANOMA OF SKIN OF FACE (H): ICD-10-CM

## 2019-10-18 DIAGNOSIS — Z01.818 PREOPERATIVE EXAMINATION: ICD-10-CM

## 2019-10-18 DIAGNOSIS — I48.0 PAROXYSMAL ATRIAL FIBRILLATION (H): ICD-10-CM

## 2019-10-18 DIAGNOSIS — I25.10 CORONARY ATHEROSCLEROSIS DUE TO CALCIFIED CORONARY LESION: ICD-10-CM

## 2019-10-18 ASSESSMENT — MIFFLIN-ST. JEOR: SCORE: 1637.31

## 2019-10-22 ENCOUNTER — ANESTHESIA EVENT (OUTPATIENT)
Dept: SURGERY | Facility: AMBULATORY SURGERY CENTER | Age: 64
End: 2019-10-22

## 2019-10-23 ENCOUNTER — HOSPITAL ENCOUNTER (OUTPATIENT)
Facility: AMBULATORY SURGERY CENTER | Age: 64
End: 2019-10-23
Attending: OTOLARYNGOLOGY
Payer: COMMERCIAL

## 2019-10-23 ENCOUNTER — ANESTHESIA (OUTPATIENT)
Dept: SURGERY | Facility: AMBULATORY SURGERY CENTER | Age: 64
End: 2019-10-23

## 2019-10-23 VITALS
BODY MASS INDEX: 26.48 KG/M2 | HEART RATE: 45 BPM | OXYGEN SATURATION: 98 % | HEIGHT: 70 IN | RESPIRATION RATE: 16 BRPM | SYSTOLIC BLOOD PRESSURE: 156 MMHG | DIASTOLIC BLOOD PRESSURE: 96 MMHG | TEMPERATURE: 97.4 F | WEIGHT: 185 LBS

## 2019-10-23 DIAGNOSIS — Z98.890 MOHS DEFECT OF FOREHEAD: Primary | ICD-10-CM

## 2019-10-23 DIAGNOSIS — M95.2 MOHS DEFECT OF FOREHEAD: Primary | ICD-10-CM

## 2019-10-23 RX ORDER — LIDOCAINE HYDROCHLORIDE AND EPINEPHRINE 10; 10 MG/ML; UG/ML
INJECTION, SOLUTION INFILTRATION; PERINEURAL PRN
Status: DISCONTINUED | OUTPATIENT
Start: 2019-10-23 | End: 2019-10-23 | Stop reason: HOSPADM

## 2019-10-23 RX ORDER — ONDANSETRON 4 MG/1
4 TABLET, ORALLY DISINTEGRATING ORAL EVERY 30 MIN PRN
Status: DISCONTINUED | OUTPATIENT
Start: 2019-10-23 | End: 2019-10-24 | Stop reason: HOSPADM

## 2019-10-23 RX ORDER — ONDANSETRON 4 MG/1
4-8 TABLET, ORALLY DISINTEGRATING ORAL EVERY 8 HOURS PRN
Qty: 4 TABLET | Refills: 0 | Status: SHIPPED | OUTPATIENT
Start: 2019-10-23 | End: 2022-01-04

## 2019-10-23 RX ORDER — PROPOFOL 10 MG/ML
INJECTION, EMULSION INTRAVENOUS CONTINUOUS PRN
Status: DISCONTINUED | OUTPATIENT
Start: 2019-10-23 | End: 2019-10-23

## 2019-10-23 RX ORDER — OXYCODONE HYDROCHLORIDE 5 MG/1
5-10 TABLET ORAL EVERY 4 HOURS PRN
Status: DISCONTINUED | OUTPATIENT
Start: 2019-10-23 | End: 2019-10-24 | Stop reason: HOSPADM

## 2019-10-23 RX ORDER — OXYCODONE HYDROCHLORIDE 5 MG/1
5 TABLET ORAL
Status: DISCONTINUED | OUTPATIENT
Start: 2019-10-23 | End: 2019-10-24 | Stop reason: HOSPADM

## 2019-10-23 RX ORDER — AMOXICILLIN 250 MG
1-2 CAPSULE ORAL 2 TIMES DAILY
Qty: 30 TABLET | Refills: 0 | Status: SHIPPED | OUTPATIENT
Start: 2019-10-23 | End: 2022-01-04

## 2019-10-23 RX ORDER — NALOXONE HYDROCHLORIDE 0.4 MG/ML
.1-.4 INJECTION, SOLUTION INTRAMUSCULAR; INTRAVENOUS; SUBCUTANEOUS
Status: DISCONTINUED | OUTPATIENT
Start: 2019-10-23 | End: 2019-10-24 | Stop reason: HOSPADM

## 2019-10-23 RX ORDER — DEXAMETHASONE SODIUM PHOSPHATE 4 MG/ML
4 INJECTION, SOLUTION INTRA-ARTICULAR; INTRALESIONAL; INTRAMUSCULAR; INTRAVENOUS; SOFT TISSUE EVERY 10 MIN PRN
Status: DISCONTINUED | OUTPATIENT
Start: 2019-10-23 | End: 2019-10-24 | Stop reason: HOSPADM

## 2019-10-23 RX ORDER — MUPIROCIN 20 MG/G
OINTMENT TOPICAL 3 TIMES DAILY
Qty: 30 G | Refills: 1 | Status: SHIPPED | OUTPATIENT
Start: 2019-10-23 | End: 2022-01-04

## 2019-10-23 RX ORDER — CEFAZOLIN SODIUM 2 G/50ML
2 SOLUTION INTRAVENOUS
Status: COMPLETED | OUTPATIENT
Start: 2019-10-23 | End: 2019-10-23

## 2019-10-23 RX ORDER — SODIUM CHLORIDE, SODIUM LACTATE, POTASSIUM CHLORIDE, CALCIUM CHLORIDE 600; 310; 30; 20 MG/100ML; MG/100ML; MG/100ML; MG/100ML
INJECTION, SOLUTION INTRAVENOUS CONTINUOUS
Status: DISCONTINUED | OUTPATIENT
Start: 2019-10-23 | End: 2019-10-23 | Stop reason: HOSPADM

## 2019-10-23 RX ORDER — MEPERIDINE HYDROCHLORIDE 25 MG/ML
12.5 INJECTION INTRAMUSCULAR; INTRAVENOUS; SUBCUTANEOUS
Status: DISCONTINUED | OUTPATIENT
Start: 2019-10-23 | End: 2019-10-24 | Stop reason: HOSPADM

## 2019-10-23 RX ORDER — ACETAMINOPHEN 325 MG/1
975 TABLET ORAL ONCE
Status: COMPLETED | OUTPATIENT
Start: 2019-10-23 | End: 2019-10-23

## 2019-10-23 RX ORDER — ACETAMINOPHEN 325 MG/1
650 TABLET ORAL
Status: DISCONTINUED | OUTPATIENT
Start: 2019-10-23 | End: 2019-10-24 | Stop reason: HOSPADM

## 2019-10-23 RX ORDER — HYDROMORPHONE HYDROCHLORIDE 1 MG/ML
.3-.5 INJECTION, SOLUTION INTRAMUSCULAR; INTRAVENOUS; SUBCUTANEOUS EVERY 10 MIN PRN
Status: DISCONTINUED | OUTPATIENT
Start: 2019-10-23 | End: 2019-10-24 | Stop reason: HOSPADM

## 2019-10-23 RX ORDER — FENTANYL CITRATE 50 UG/ML
INJECTION, SOLUTION INTRAMUSCULAR; INTRAVENOUS PRN
Status: DISCONTINUED | OUTPATIENT
Start: 2019-10-23 | End: 2019-10-23

## 2019-10-23 RX ORDER — PROPOFOL 10 MG/ML
INJECTION, EMULSION INTRAVENOUS PRN
Status: DISCONTINUED | OUTPATIENT
Start: 2019-10-23 | End: 2019-10-23

## 2019-10-23 RX ORDER — ONDANSETRON 2 MG/ML
INJECTION INTRAMUSCULAR; INTRAVENOUS PRN
Status: DISCONTINUED | OUTPATIENT
Start: 2019-10-23 | End: 2019-10-23

## 2019-10-23 RX ORDER — OXYCODONE HYDROCHLORIDE 5 MG/1
5-10 TABLET ORAL EVERY 4 HOURS PRN
Qty: 10 TABLET | Refills: 0 | Status: SHIPPED | OUTPATIENT
Start: 2019-10-23 | End: 2022-01-04

## 2019-10-23 RX ORDER — LIDOCAINE HYDROCHLORIDE 20 MG/ML
INJECTION, SOLUTION INFILTRATION; PERINEURAL PRN
Status: DISCONTINUED | OUTPATIENT
Start: 2019-10-23 | End: 2019-10-23

## 2019-10-23 RX ORDER — GABAPENTIN 300 MG/1
300 CAPSULE ORAL ONCE
Status: COMPLETED | OUTPATIENT
Start: 2019-10-23 | End: 2019-10-23

## 2019-10-23 RX ORDER — SODIUM CHLORIDE, SODIUM LACTATE, POTASSIUM CHLORIDE, CALCIUM CHLORIDE 600; 310; 30; 20 MG/100ML; MG/100ML; MG/100ML; MG/100ML
INJECTION, SOLUTION INTRAVENOUS CONTINUOUS
Status: DISCONTINUED | OUTPATIENT
Start: 2019-10-23 | End: 2019-10-24 | Stop reason: HOSPADM

## 2019-10-23 RX ORDER — FENTANYL CITRATE 50 UG/ML
25-50 INJECTION, SOLUTION INTRAMUSCULAR; INTRAVENOUS
Status: DISCONTINUED | OUTPATIENT
Start: 2019-10-23 | End: 2019-10-23 | Stop reason: HOSPADM

## 2019-10-23 RX ORDER — ALBUTEROL SULFATE 0.83 MG/ML
2.5 SOLUTION RESPIRATORY (INHALATION) EVERY 4 HOURS PRN
Status: DISCONTINUED | OUTPATIENT
Start: 2019-10-23 | End: 2019-10-23 | Stop reason: HOSPADM

## 2019-10-23 RX ORDER — FENTANYL CITRATE 50 UG/ML
25-50 INJECTION, SOLUTION INTRAMUSCULAR; INTRAVENOUS
Status: DISCONTINUED | OUTPATIENT
Start: 2019-10-23 | End: 2019-10-24 | Stop reason: HOSPADM

## 2019-10-23 RX ORDER — ONDANSETRON 2 MG/ML
4 INJECTION INTRAMUSCULAR; INTRAVENOUS EVERY 30 MIN PRN
Status: DISCONTINUED | OUTPATIENT
Start: 2019-10-23 | End: 2019-10-24 | Stop reason: HOSPADM

## 2019-10-23 RX ORDER — MUPIROCIN 20 MG/G
OINTMENT TOPICAL PRN
Status: DISCONTINUED | OUTPATIENT
Start: 2019-10-23 | End: 2019-10-23 | Stop reason: HOSPADM

## 2019-10-23 RX ORDER — DEXAMETHASONE SODIUM PHOSPHATE 4 MG/ML
INJECTION, SOLUTION INTRA-ARTICULAR; INTRALESIONAL; INTRAMUSCULAR; INTRAVENOUS; SOFT TISSUE PRN
Status: DISCONTINUED | OUTPATIENT
Start: 2019-10-23 | End: 2019-10-23

## 2019-10-23 RX ORDER — ONDANSETRON 4 MG/1
4 TABLET, ORALLY DISINTEGRATING ORAL
Status: DISCONTINUED | OUTPATIENT
Start: 2019-10-23 | End: 2019-10-24 | Stop reason: HOSPADM

## 2019-10-23 RX ADMIN — ONDANSETRON 4 MG: 2 INJECTION INTRAMUSCULAR; INTRAVENOUS at 12:48

## 2019-10-23 RX ADMIN — GABAPENTIN 300 MG: 300 CAPSULE ORAL at 10:58

## 2019-10-23 RX ADMIN — PROPOFOL 200 MCG/KG/MIN: 10 INJECTION, EMULSION INTRAVENOUS at 12:38

## 2019-10-23 RX ADMIN — ACETAMINOPHEN 975 MG: 325 TABLET ORAL at 10:58

## 2019-10-23 RX ADMIN — LIDOCAINE HYDROCHLORIDE 100 MG: 20 INJECTION, SOLUTION INFILTRATION; PERINEURAL at 12:38

## 2019-10-23 RX ADMIN — CEFAZOLIN SODIUM 2 G: 2 SOLUTION INTRAVENOUS at 12:48

## 2019-10-23 RX ADMIN — PROPOFOL 200 MG: 10 INJECTION, EMULSION INTRAVENOUS at 12:38

## 2019-10-23 RX ADMIN — SODIUM CHLORIDE, SODIUM LACTATE, POTASSIUM CHLORIDE, CALCIUM CHLORIDE: 600; 310; 30; 20 INJECTION, SOLUTION INTRAVENOUS at 11:03

## 2019-10-23 RX ADMIN — FENTANYL CITRATE 50 MCG: 50 INJECTION, SOLUTION INTRAMUSCULAR; INTRAVENOUS at 12:38

## 2019-10-23 RX ADMIN — DEXAMETHASONE SODIUM PHOSPHATE 4 MG: 4 INJECTION, SOLUTION INTRA-ARTICULAR; INTRALESIONAL; INTRAMUSCULAR; INTRAVENOUS; SOFT TISSUE at 12:48

## 2019-10-23 RX ADMIN — FENTANYL CITRATE 50 MCG: 50 INJECTION, SOLUTION INTRAMUSCULAR; INTRAVENOUS at 13:14

## 2019-10-23 ASSESSMENT — MIFFLIN-ST. JEOR: SCORE: 1630.4

## 2019-10-23 NOTE — ANESTHESIA CARE TRANSFER NOTE
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    Anesthesia Type:   No value filed.     Note:  Airway :Face Mask  Patient transferred to:PACU  Handoff Report: Identifed the Patient, Identified the Reponsible Provider, Reviewed the pertinent medical history, Discussed the surgical course, Reviewed Intra-OP anesthesia mangement and issues during anesthesia, Set expectations for post-procedure period and Allowed opportunity for questions and acknowledgement of understanding      Vitals: (Last set prior to Anesthesia Care Transfer)    CRNA VITALS  10/23/2019 1345 - 10/23/2019 1419      10/23/2019             Pulse:  56    Temp:  34.4  C (93.9  F)    SpO2:  99 %    Resp Rate (observed):  (!) 1    Resp Rate (set):  10                Electronically Signed By: SURJIT Min CRNA  October 23, 2019  2:19 PM

## 2019-10-23 NOTE — DISCHARGE INSTRUCTIONS
[facility]  Home Care Following Anesthesia  For 24 hours after surgery:  1. Get plenty of rest.  A responsible adult must stay with you for at least 24 hours after you leave the surgery center.  2. Do not drive or use heavy equipment.  If you have weakness or tingling, don't drive or use heavy equipment until this feeling goes away.   3. Do not drink alcohol.   4. Avoid strenuous or risky activities.  Ask for help when climbing stairs.  5. You may feel lightheaded.  IF so, sit for a few minutes before standing.  Have someone help you get up.   6. If you have nausea (feel sick to your stomach): Drink only clear liquids such as apple juice, ginger ale, broth or 7-Up.  Rest may also help.  Be sure to drink enough fluids.  Move to a regular diet as you feel able.   7. You may have a slight fever.  Call the doctor if your fever is over 100 F (37.7 C) (taken under the tongue) or lasts longer than 24 hours.  8. You may have a dry mouth, a sore throat, muscle aches or trouble sleeping. These should go away after 24 hours.  9. Do not make important or legal decisions.               Tips for taking pain medications  To get the best pain relief possible, remember these points:    Take pain medications as directed, before pain becomes severe.    Pain medication can upset your stomach: taking it with food may help.    Constipation is a common side effect of pain medication. Drink plenty of  fluids.    Eat foods high in fiber. Take a stool softener if recommended by your doctor or pharmacist.    Do not drink alcohol, drive or operate machinery while taking pain medications.    Ask about other ways to control pain, such as with heat, ice or relaxation.    Tylenol/Acetaminophen Consumption  To help encourage the safe use of acetaminophen, the makers of TYLENOL  have lowered the maximum daily dose for single-ingredient Extra Strength TYLENOL  (acetaminophen) products sold in the [place] from 8  pills per day (4,000 mg) to 6 pills per day (3,000 mg). The dosing interval has also changed from 2 pills every 4-6 hours to 2 pills every 6 hours.    If you feel your pain relief is insufficient, you may take Tylenol/Acetaminophen in addition to your narcotic pain medication.     Be careful not to exceed 3,000 mg of Tylenol/Acetaminophen in a 24 hour period from all sources.    If you are taking extra strength Tylenol/acetaminophen (500 mg), the maximum dose is 6 tablets in 24 hours.    If you are taking regular strength acetaminophen (325 mg), the maximum dose is 9 tablets in 24 hours.    Call a doctor for any of the followin. Signs of infection (fever, growing tenderness at the surgery site, a large amount of drainage or bleeding, severe pain, foul-smelling drainage, redness, swelling).  2. It has been over 8 to 10 hours since surgery and you are still not able to urinate (pass water).  3. Headache for over 24 hours.  Your doctor is:  Dr. Alexei Stewart, ENT Otolaryngology: [phone]                 Or dial [phone] and ask for the resident on call for:  ENT Otolaryngology  For emergency care, call the:  [facility]:  [phone] (TTY for hearing impaired: [phone])

## 2019-10-23 NOTE — OP NOTE
Procedure Date: 10/23/2019      PREOPERATIVE DIAGNOSIS:  3.5 x 3.0 cm defect of the glabella, right upper eyelid, nasal radix and forehead.      POSTOPERATIVE DIAGNOSIS:  3.5 x 3.0 cm defect of the glabella, right upper eyelid, nasal radix and forehead.      OPERATIVE PROCEDURE:  Bilateral forehead advancement flaps, eyelid advancement flap and 1.4 x 1.5 cm full-thickness skin graft, left preauricular area to nose.      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  It was centered over the radix and glabella.  The defect extended into the deeper subcutaneous and galeal fascial layers.  A portion of the medial aspect of the right upper lid had also been involved in the tumor.  We discussed the risks and benefits of repair, and the patient wished to proceed.      TECHNIQUE:  General anesthesia was established with an LMA tube.  Approximately, 10 mL 1% Xylocaine with 1:100,000 epinephrine were injected about the forehead, nose and the left preauricular area.  The patient was prepped and draped.  Tegaderm was placed on the left eye.  The perimeter of the wound was undermined for approximately 4 cm.  This included down to the nasal tip and the immediate subcutaneous subdermal plane across the medial half of the right upper eyelid in a subgaleal plane over the forehead, so as to not interfere with the supratrochlear and supraorbital neurovasculature.  Undermining was carried out for at least 5 cm above each brow.  The tissue was then advanced medially and repaired with buried 3-0 Monocryl sutures.  A standing cutaneous cone superiorly was resected in order to allow the tissues to redrape more appropriately.  Incision was made in the tarsal crease in the medial aspect of the right upper lid and the tissue inferiorly rotated medially to allow the lid tissue to completely cover the lid portion of the defect.  The nose was  undermined and mobilized superiorly, and additional 3-0 Monocryl sutures were placed.  This then left a defect that was relatively fusiform in configuration that included the radix and the most superior right nasal sidewall.  A full-thickness skin graft of appropriate dimension was harvested from the left preauricular area.  Tissue was advanced in the cheek to close the donor site, which was achieved with buried 4-0 Monocryl suture and running 5-0 nylon in the skin.  The graft was defatted and then placed into the defect and secured with interrupted 5-0 Prolene sutures and 5-0 plain gut suture.  A Bactroban Telfa Aquaplast stent was placed on the skin graft and a light compressive wrap applied to the forehead.  Blood loss was less than 25 mL.      SURGEONS:  Shade Stewart MD, Ronel Wilson MD, Benedict Shafer MD, and MD SHADE hCauhan MD             D: 10/23/2019   T: 10/23/2019   MT: TONY      Name:     ANGIE PIÑA   MRN:      [id number]        Account:        [id number]   :      1955           Procedure Date: 10/23/2019      Document: [id number]       cc: José Antonio Shaw MD

## 2019-10-23 NOTE — BRIEF OP NOTE
[facility]    Brief Operative Note    Pre-operative diagnosis: Lentigo maligna (H) [D03.9]  Post-operative diagnosis Same as pre-operative diagnosis    Procedure: Procedure(s):  Mohs Reconstruction nasal, left cheek graft  Surgeon: Surgeon(s) and Role:     * Alexei Stewart MD - Primary  Anesthesia: General   Estimated blood loss: Less than 10 ml  Drains: None  Specimens: * No specimens in log *  Findings:   3.5 x 3cm Mohs defect. Skin graft measuring 1.5 x 1.4 cm..  Complications: None.  Implants: * No implants in log *

## 2019-10-23 NOTE — ANESTHESIA PREPROCEDURE EVALUATION
Anesthesia Pre-Procedure Evaluation    Patient: Jose Lynn   MRN:     [id number] Gender:   male   Age:    64 year old :      1955        Preoperative Diagnosis: Lentigo maligna (H) [D03.9]   Procedure(s):  Right Mohs Repair     Past Medical History:   Diagnosis Date     PONV (postoperative nausea and vomiting)       History reviewed. No pertinent surgical history.       Anesthesia Evaluation     .             ROS/MED HX    ENT/Pulmonary:  - neg pulmonary ROS     Neurologic:  - neg neurologic ROS     Cardiovascular:     (+) Dyslipidemia, hypertension--CAD, --stent,. Taking blood thinners Pt has received instructions: Instructions Given to patient: 81mg aspirin. . . :. .       METS/Exercise Tolerance:     Hematologic:  - neg hematologic  ROS       Musculoskeletal:  - neg musculoskeletal ROS       GI/Hepatic:     (+) GERD       Renal/Genitourinary:  - ROS Renal section negative       Endo:  - neg endo ROS       Psychiatric:  - neg psychiatric ROS       Infectious Disease:  - neg infectious disease ROS       Malignancy:   (+) Malignancy History of Skin          Other:                         PHYSICAL EXAM:   Mental Status/Neuro: A/A/O   Airway: Facies: Feasible  Mallampati: I  Mouth/Opening: Full  TM distance: > 6 cm  Neck ROM: Full   Respiratory: Auscultation: CTAB     Resp. Rate: Normal     Resp. Effort: Normal      CV: Rhythm: Regular  Rate: Age appropriate  Heart: Normal Sounds  Edema: None   Comments:      Dental: Normal Dentition                LABS:  CBC: No results found for: WBC, HGB, HCT, PLT  BMP: No results found for: NA, POTASSIUM, CHLORIDE, CO2, BUN, CR, GLC  COAGS: No results found for: PTT, INR, FIBR  POC: No results found for: BGM, HCG, HCGS  OTHER: No results found for: PH, LACT, A1C, RUPAL, PHOS, MAG, ALBUMIN, PROTTOTAL, ALT, AST, GGT, ALKPHOS, BILITOTAL, BILIDIRECT, LIPASE, AMYLASE, REGINA, TSH, T4, T3, CRP, SED     Preop Vitals    BP Readings from Last 3 Encounters:   10/23/19 (!) 131/94     "Pulse Readings from Last 3 Encounters:   10/23/19 50      Resp Readings from Last 3 Encounters:   10/23/19 16    SpO2 Readings from Last 3 Encounters:   10/23/19 98%      Temp Readings from Last 1 Encounters:   10/23/19 36.5  C (97.7  F) (Oral)    Ht Readings from Last 1 Encounters:   10/23/19 1.778 m (5' 10\")      Wt Readings from Last 1 Encounters:   10/23/19 83.9 kg (185 lb)    Estimated body mass index is 26.54 kg/m  as calculated from the following:    Height as of this encounter: 1.778 m (5' 10\").    Weight as of this encounter: 83.9 kg (185 lb).     LDA:  Peripheral IV 10/23/19 Right Hand (Active)   Site Assessment WDL 10/23/2019 11:00 AM   Line Status Infusing 10/23/2019 11:00 AM   Phlebitis Scale 0-->no symptoms 10/23/2019 11:00 AM   Infiltration Scale 0 10/23/2019 11:00 AM   Number of days: 0       Airway - Adult/Peds 4 laryngeal mask airway (Active)   Number of days: 0        Assessment:   ASA SCORE: 3    H&P: History and physical reviewed and following examination; no interval change.   Smoking Status:  Non-Smoker/Unknown   NPO Status: NPO Appropriate     Plan:   Anes. Type:  General   Pre-Medication: None   Induction:  IV (Standard)   Airway: LMA   Access/Monitoring: PIV   Maintenance: Balanced     Postop Plan:   Postop Pain: Opioids  Postop Sedation/Airway: Not planned  Disposition: Outpatient     PONV Management:   Adult Risk Factors:, H/o PONV or Motion Sickness, Non-Smoker, Postop Opioids   Prevention: Ondansetron, Dexamethasone     CONSENT: Direct conversation   Plan and risks discussed with: Patient   Blood Products: Consent Deferred (Minimal Blood Loss)                   Rahul Salas MD  Staff Anesthesiologist  [phone]    "

## 2019-10-23 NOTE — ANESTHESIA POSTPROCEDURE EVALUATION
Anesthesia POST Procedure Evaluation    Patient: Jose Lynn   MRN:     [id number] Gender:   male   Age:    64 year old :      1955        Preoperative Diagnosis: Lentigo maligna (H) [D03.9]   Procedure(s):  Mohs Reconstruction nasal, left cheek graft   Postop Comments: No value filed.       Anesthesia Type:  Not documented  No value filed.    Reportable Event: NO     PAIN: Uncomplicated   Sign Out status: Comfortable, Well controlled pain     PONV: No PONV   Sign Out status:  No Nausea or Vomiting     Neuro/Psych: Uneventful perioperative course   Sign Out Status: Preoperative baseline; Age appropriate mentation     Airway/Resp.: Uneventful perioperative course   Sign Out Status: Non labored breathing, age appropriate RR; Resp. Status within EXPECTED Parameters     CV: Uneventful perioperative course   Sign Out status: Appropriate BP and perfusion indices; Appropriate HR/Rhythm     Disposition:   Sign Out in:  PACU  Disposition:  Phase II; Home  Recovery Course: Uneventful  Follow-Up: Not required           Last Anesthesia Record Vitals:  CRNA VITALS  10/23/2019 1345 - 10/23/2019 1445      10/23/2019             Pulse:  56    Temp:  34.4  C (93.9  F)    SpO2:  99 %    Resp Rate (observed):  (!) 1    Resp Rate (set):  10          Last PACU Vitals:  Vitals Value Taken Time   /94 10/23/2019  2:36 PM   Temp 36.3  C (97.4  F) 10/23/2019  2:36 PM   Pulse 48 10/23/2019  2:36 PM   Resp 12 10/23/2019  2:36 PM   SpO2 99 % 10/23/2019  2:36 PM   Temp src     NIBP     Pulse     SpO2     Resp     Temp     Ht Rate     Temp 2           Electronically Signed By: Elmer Pond DO, 2019, 3:27 PM

## 2019-10-30 ENCOUNTER — OFFICE VISIT (OUTPATIENT)
Dept: PLASTIC SURGERY | Facility: CLINIC | Age: 64
End: 2019-10-30
Payer: COMMERCIAL

## 2019-10-30 DIAGNOSIS — Z98.890 POSTOPERATIVE STATE: Primary | ICD-10-CM

## 2019-10-30 NOTE — LETTER
October 30, 2019  Re: Jose Lynn  1955    Dear Dr. Shaw,    Thank you so much for referring Jose Lynn to the [facility]. I had the pleasure of visiting with Jose today.     Attached you will find a copy of my note. Please feel free to reach out to me with any questions, [phone].     Service Date: 10/30/2019      HISTORY OF PRESENT ILLNESS:  Mr. Lynn is a patient of Dr. Shaw.        PHYSICAL EXAMINATION:  His stent is off.  All sutures are removed.  The graft has had a terrific take.  I think this will be an outstanding reconstruction.        ASSESSMENT AND PLAN: He will follow up with us in a couple of weeks. FMLA forms were filled out today.       Alexei Stewart M.D.        October 30, 2019      José Antonio Shaw M.D.   [facility]    [address] [place] [address]      Dear Dr. Shaw,      We saw Mr. Lynn back today.  We repaired his defect with bilateral rotation advancement flaps and a small full thickness skin graft to the radix region.  He has had an excellent take of the graft.  The repair is really outstanding even this early on.  I am quite impressed.  I am delighted to help care for him.  He is appreciative of your efforts.  I look forward to a favorable outcome.       Thanks for having him see us.       Sincerely,      ALEXEI STEWART MD

## 2019-10-30 NOTE — NURSING NOTE
Chief Complaint   Patient presents with     Post-op Visit     mohs repair     Patient to follow up in one month with Dr. Stewart. Maeve Nj, Patient Coordinator

## 2019-10-31 NOTE — PROGRESS NOTES
Service Date: 10/30/2019      HISTORY OF PRESENT ILLNESS:  Mr. Piña is a patient of Dr. Shaw.        PHYSICAL EXAMINATION:  His stent is off.  All sutures are removed.  The graft has had a terrific take.  I think this will be an outstanding reconstruction.        ASSESSMENT AND PLAN: He will follow up with us in a couple of weeks. [place] forms were [place] today.       Alexei Stewart M.D.            2019         José Antonio Shaw M.D.   [facility]    [address] [place] [address]         Dear Dr. Shaw,      We saw Mr. Piña back today.  We repaired his defect with bilateral rotation advancement flaps and a small full thickness skin graft to the radix region.  He has had an excellent take of the graft.  The repair is really outstanding even this early on.  I am quite impressed.  I am delighted to help care for him.  He is appreciative of your efforts.  I look forward to a favorable outcome.       Thanks for having him see us.       Sincerely,         ALEXEI STEWART MD             D: 10/31/2019   T: 10/31/2019   MT: ms      Name:     ANGIE PIÑA   MRN:      [id number]        Account:      [id number]   :      1955           Service Date: 10/30/2019      Document: [id number]

## 2019-11-07 ENCOUNTER — COMMUNICATION - HEALTHEAST (OUTPATIENT)
Dept: SCHEDULING | Facility: CLINIC | Age: 64
End: 2019-11-07

## 2019-11-21 ENCOUNTER — OFFICE VISIT (OUTPATIENT)
Dept: PLASTIC SURGERY | Facility: CLINIC | Age: 64
End: 2019-11-21
Payer: COMMERCIAL

## 2019-11-21 DIAGNOSIS — Z98.890 POSTOPERATIVE STATE: Primary | ICD-10-CM

## 2019-11-21 NOTE — LETTER
11/21/2019       RE: Jose Lynn  [address]  [place] [address]     Dear Colleague,    Thank you for referring your patient, Jose Lynn, to the [facility] at [facility]. Please see a copy of my visit note below.      This office note has been dictated.    Again, thank you for allowing me to participate in the care of your patient.      Sincerely,    SHADE TREJO MD

## 2019-11-21 NOTE — LETTER
RE: Angie Piña  [address]  [place] [address]     Dear Colleague,    Thank you for referring your patient, Angie Piña, to the [facility] at [facility]. Please see a copy of my visit note below.    Service Date: 2019      HISTORY OF PRESENT ILLNESS: Angie is in today.  The area is continuing to heal favorably, although he does have a little bit of scar hypertrophy in the medial right upper eyelid orbit region.       PROCEDURE: I injected 0.1 cc of 10 mg/cc Kenalog into that area.        ASSESSMENT AND PLAN:  I will have him carry out massage.  I talked to him about silicone placement.  He will reflect on this and be back in touch with us.  Next time he is in, I want to drop a note to the referring doctor.     SHADE TREJO MD       D: 2019   T: 2019   MT: ms      Name:     ANGIE PIÑA   MRN:      [id number]        Account:      [id number]   :      1955           Service Date: 2019      Document: [id number]

## 2019-11-21 NOTE — LETTER
RE: Angie Piña  [address]  [place] [address]     Dear Colleague,    Thank you for referring your patient, Angie Piña, to the [facility] at [facility]. Please see a copy of my visit note below.    Service Date: 2019      HISTORY OF PRESENT ILLNESS: Angie is in today.  The area is continuing to heal favorably, although he does have a little bit of scar hypertrophy in the medial right upper eyelid orbit region.       PROCEDURE: I injected 0.1 cc of 10 mg/cc Kenalog into that area.        ASSESSMENT AND PLAN:  I will have him carry out massage.  I talked to him about silicone placement.  He will reflect on this and be back in touch with us.  Next time he is in, I want to drop a note to the referring doctor.      SHADE TREJO MD      D: 2019   T: 2019   MT: ms      Name:     ANGIE PIÑA   MRN:      [id number]        Account:      [id number]   :      1955           Service Date: 2019      Document: [id number]

## 2019-11-21 NOTE — LETTER
November 21, 2019  Re: Jose Lynn  1955    Dear Dr. Shaw,    Thank you so much for referring Jose Lynn to the [facility]. I had the pleasure of visiting with Jose today.     Attached you will find a copy of my note. Please feel free to reach out to me with any questions, [phone].       This office note has been dictated.      Your trust in our practice and care is much appreciated.    Sincerely,  SHADE TREJO MD

## 2019-11-24 NOTE — PROGRESS NOTES
Service Date: 2019      HISTORY OF PRESENT ILLNESS: Angie is in today.  The area is continuing to heal favorably, although he does have a little bit of scar hypertrophy in the medial right upper eyelid orbit region.       PROCEDURE: I injected 0.1 cc of 10 mg/cc Kenalog into that area.        ASSESSMENT AND PLAN:  I will have him carry out massage.  I talked to him about silicone placement.  He will reflect on this and be back in touch with us.  Next time he is in, I want to drop a note to the referring doctor.         SHADE TREJO MD             D: 2019   T: 2019   MT: ms      Name:     ANGIE PIÑA   MRN:      [id number]        Account:      [id number]   :      1955           Service Date: 2019      Document: [id number]

## 2019-12-08 ENCOUNTER — OFFICE VISIT - HEALTHEAST (OUTPATIENT)
Dept: FAMILY MEDICINE | Facility: CLINIC | Age: 64
End: 2019-12-08

## 2019-12-08 DIAGNOSIS — J01.90 ACUTE NON-RECURRENT SINUSITIS, UNSPECIFIED LOCATION: ICD-10-CM

## 2019-12-08 DIAGNOSIS — B30.9 VIRAL CONJUNCTIVITIS: ICD-10-CM

## 2020-01-14 ENCOUNTER — COMMUNICATION - HEALTHEAST (OUTPATIENT)
Dept: INTERNAL MEDICINE | Facility: CLINIC | Age: 65
End: 2020-01-14

## 2020-01-14 DIAGNOSIS — E78.5 HYPERLIPIDEMIA: ICD-10-CM

## 2020-06-23 ENCOUNTER — COMMUNICATION - HEALTHEAST (OUTPATIENT)
Dept: SCHEDULING | Facility: CLINIC | Age: 65
End: 2020-06-23

## 2020-06-25 ENCOUNTER — OFFICE VISIT - HEALTHEAST (OUTPATIENT)
Dept: FAMILY MEDICINE | Facility: CLINIC | Age: 65
End: 2020-06-25

## 2020-06-25 DIAGNOSIS — H61.23 BILATERAL IMPACTED CERUMEN: ICD-10-CM

## 2020-07-02 ENCOUNTER — COMMUNICATION - HEALTHEAST (OUTPATIENT)
Dept: INTERNAL MEDICINE | Facility: CLINIC | Age: 65
End: 2020-07-02

## 2020-07-02 DIAGNOSIS — E78.5 HYPERLIPIDEMIA: ICD-10-CM

## 2020-07-09 ENCOUNTER — OFFICE VISIT - HEALTHEAST (OUTPATIENT)
Dept: INTERNAL MEDICINE | Facility: CLINIC | Age: 65
End: 2020-07-09

## 2020-07-09 DIAGNOSIS — Z23 NEED FOR PNEUMOCOCCAL VACCINATION: ICD-10-CM

## 2020-07-09 DIAGNOSIS — H66.001 NON-RECURRENT ACUTE SUPPURATIVE OTITIS MEDIA OF RIGHT EAR WITHOUT SPONTANEOUS RUPTURE OF TYMPANIC MEMBRANE: ICD-10-CM

## 2020-07-09 DIAGNOSIS — N52.9 ERECTILE DYSFUNCTION, UNSPECIFIED ERECTILE DYSFUNCTION TYPE: ICD-10-CM

## 2020-07-09 DIAGNOSIS — I25.10 CORONARY ATHEROSCLEROSIS DUE TO CALCIFIED CORONARY LESION: ICD-10-CM

## 2020-07-09 DIAGNOSIS — I25.84 CORONARY ATHEROSCLEROSIS DUE TO CALCIFIED CORONARY LESION: ICD-10-CM

## 2020-07-27 ENCOUNTER — COMMUNICATION - HEALTHEAST (OUTPATIENT)
Dept: CARDIOLOGY | Facility: CLINIC | Age: 65
End: 2020-07-27

## 2020-07-27 DIAGNOSIS — I25.10 CORONARY ATHEROSCLEROSIS DUE TO CALCIFIED CORONARY LESION: ICD-10-CM

## 2020-07-27 DIAGNOSIS — R94.39 ABNORMAL STRESS ECHOCARDIOGRAM: ICD-10-CM

## 2020-07-27 DIAGNOSIS — I25.84 CORONARY ATHEROSCLEROSIS DUE TO CALCIFIED CORONARY LESION: ICD-10-CM

## 2020-07-28 ENCOUNTER — OFFICE VISIT - HEALTHEAST (OUTPATIENT)
Dept: FAMILY MEDICINE | Facility: CLINIC | Age: 65
End: 2020-07-28

## 2020-07-28 ENCOUNTER — COMMUNICATION - HEALTHEAST (OUTPATIENT)
Dept: ADMINISTRATIVE | Facility: CLINIC | Age: 65
End: 2020-07-28

## 2020-07-28 DIAGNOSIS — R07.9 ACUTE CHEST PAIN: ICD-10-CM

## 2020-07-28 DIAGNOSIS — I25.84 CORONARY ARTERY DISEASE DUE TO CALCIFIED CORONARY LESION: ICD-10-CM

## 2020-07-28 DIAGNOSIS — I25.10 CORONARY ARTERY DISEASE DUE TO CALCIFIED CORONARY LESION: ICD-10-CM

## 2020-07-28 LAB
ATRIAL RATE - MUSE: 48 BPM
DIASTOLIC BLOOD PRESSURE - MUSE: NORMAL
INTERPRETATION ECG - MUSE: NORMAL
P AXIS - MUSE: 53 DEGREES
PR INTERVAL - MUSE: 196 MS
QRS DURATION - MUSE: 88 MS
QT - MUSE: 458 MS
QTC - MUSE: 409 MS
R AXIS - MUSE: 54 DEGREES
SYSTOLIC BLOOD PRESSURE - MUSE: NORMAL
T AXIS - MUSE: 52 DEGREES
TROPONIN I SERPL-MCNC: <0.01 NG/ML (ref 0–0.29)
VENTRICULAR RATE- MUSE: 48 BPM

## 2020-08-07 ENCOUNTER — COMMUNICATION - HEALTHEAST (OUTPATIENT)
Dept: CARDIOLOGY | Facility: CLINIC | Age: 65
End: 2020-08-07

## 2020-08-10 ENCOUNTER — AMBULATORY - HEALTHEAST (OUTPATIENT)
Dept: CARDIOLOGY | Facility: CLINIC | Age: 65
End: 2020-08-10

## 2020-08-10 ENCOUNTER — SURGERY - HEALTHEAST (OUTPATIENT)
Dept: CARDIOLOGY | Facility: CLINIC | Age: 65
End: 2020-08-10

## 2020-08-10 ENCOUNTER — OFFICE VISIT - HEALTHEAST (OUTPATIENT)
Dept: CARDIOLOGY | Facility: CLINIC | Age: 65
End: 2020-08-10

## 2020-08-10 ENCOUNTER — AMBULATORY - HEALTHEAST (OUTPATIENT)
Dept: LAB | Facility: CLINIC | Age: 65
End: 2020-08-10

## 2020-08-10 DIAGNOSIS — I20.89 OTHER FORMS OF ANGINA PECTORIS (H): ICD-10-CM

## 2020-08-10 DIAGNOSIS — Z11.59 ENCOUNTER FOR SCREENING FOR OTHER VIRAL DISEASES: ICD-10-CM

## 2020-08-10 DIAGNOSIS — R94.39 ABNORMAL STRESS ECHOCARDIOGRAM: ICD-10-CM

## 2020-08-10 DIAGNOSIS — I25.10 CORONARY ARTERY DISEASE INVOLVING NATIVE CORONARY ARTERY WITHOUT ANGINA PECTORIS, UNSPECIFIED WHETHER NATIVE OR TRANSPLANTED HEART: ICD-10-CM

## 2020-08-10 ASSESSMENT — MIFFLIN-ST. JEOR: SCORE: 1625.97

## 2020-08-12 ENCOUNTER — COMMUNICATION - HEALTHEAST (OUTPATIENT)
Dept: SCHEDULING | Facility: CLINIC | Age: 65
End: 2020-08-12

## 2020-08-12 ENCOUNTER — OFFICE VISIT - HEALTHEAST (OUTPATIENT)
Dept: FAMILY MEDICINE | Facility: CLINIC | Age: 65
End: 2020-08-12

## 2020-08-12 DIAGNOSIS — H65.91 OME (OTITIS MEDIA WITH EFFUSION), RIGHT: ICD-10-CM

## 2020-08-12 DIAGNOSIS — H61.21 EXCESSIVE CERUMEN IN EAR CANAL, RIGHT: ICD-10-CM

## 2020-08-12 DIAGNOSIS — H60.501 ACUTE OTITIS EXTERNA OF RIGHT EAR, UNSPECIFIED TYPE: ICD-10-CM

## 2020-08-13 ENCOUNTER — SURGERY - HEALTHEAST (OUTPATIENT)
Dept: CARDIOLOGY | Facility: CLINIC | Age: 65
End: 2020-08-13

## 2020-08-13 ASSESSMENT — MIFFLIN-ST. JEOR: SCORE: 1625.4

## 2020-08-14 ENCOUNTER — AMBULATORY - HEALTHEAST (OUTPATIENT)
Dept: OTHER | Facility: CLINIC | Age: 65
End: 2020-08-14

## 2020-08-14 ENCOUNTER — DOCUMENTATION ONLY (OUTPATIENT)
Dept: OTHER | Facility: CLINIC | Age: 65
End: 2020-08-14

## 2020-08-14 ASSESSMENT — MIFFLIN-ST. JEOR: SCORE: 1609.53

## 2020-08-26 ENCOUNTER — COMMUNICATION - HEALTHEAST (OUTPATIENT)
Dept: CARDIOLOGY | Facility: CLINIC | Age: 65
End: 2020-08-26

## 2020-08-27 ENCOUNTER — COMMUNICATION - HEALTHEAST (OUTPATIENT)
Dept: CARDIOLOGY | Facility: CLINIC | Age: 65
End: 2020-08-27

## 2020-08-27 ENCOUNTER — OFFICE VISIT - HEALTHEAST (OUTPATIENT)
Dept: CARDIOLOGY | Facility: CLINIC | Age: 65
End: 2020-08-27

## 2020-08-27 DIAGNOSIS — I25.84 CORONARY ATHEROSCLEROSIS DUE TO CALCIFIED CORONARY LESION: ICD-10-CM

## 2020-08-27 DIAGNOSIS — I25.10 CORONARY ATHEROSCLEROSIS DUE TO CALCIFIED CORONARY LESION: ICD-10-CM

## 2020-08-27 DIAGNOSIS — E78.5 DYSLIPIDEMIA, GOAL LDL BELOW 70: ICD-10-CM

## 2020-08-31 ENCOUNTER — COMMUNICATION - HEALTHEAST (OUTPATIENT)
Dept: CARDIOLOGY | Facility: CLINIC | Age: 65
End: 2020-08-31

## 2020-11-14 ENCOUNTER — COMMUNICATION - HEALTHEAST (OUTPATIENT)
Dept: INTERNAL MEDICINE | Facility: CLINIC | Age: 65
End: 2020-11-14

## 2020-11-14 DIAGNOSIS — Z23 NEED FOR PNEUMOCOCCAL VACCINATION: ICD-10-CM

## 2020-11-14 DIAGNOSIS — N52.9 ERECTILE DYSFUNCTION, UNSPECIFIED ERECTILE DYSFUNCTION TYPE: ICD-10-CM

## 2020-12-02 ENCOUNTER — OFFICE VISIT - HEALTHEAST (OUTPATIENT)
Dept: CARDIOLOGY | Facility: CLINIC | Age: 65
End: 2020-12-02

## 2020-12-02 DIAGNOSIS — I20.89 STABLE ANGINA PECTORIS (H): ICD-10-CM

## 2020-12-02 ASSESSMENT — MIFFLIN-ST. JEOR: SCORE: 1648.08

## 2021-02-02 ENCOUNTER — HOSPITAL ENCOUNTER (OUTPATIENT)
Dept: NUCLEAR MEDICINE | Facility: CLINIC | Age: 66
Discharge: HOME | End: 2021-02-02
Attending: INTERNAL MEDICINE

## 2021-02-02 ENCOUNTER — HOSPITAL ENCOUNTER (OUTPATIENT)
Dept: CARDIOLOGY | Facility: CLINIC | Age: 66
Discharge: HOME | End: 2021-02-02
Attending: INTERNAL MEDICINE

## 2021-02-02 DIAGNOSIS — I20.89 STABLE ANGINA PECTORIS (H): ICD-10-CM

## 2021-02-02 LAB
CV STRESS CURRENT BP HE: NORMAL
CV STRESS CURRENT HR HE: 102
CV STRESS CURRENT HR HE: 102
CV STRESS CURRENT HR HE: 103
CV STRESS CURRENT HR HE: 123
CV STRESS CURRENT HR HE: 127
CV STRESS CURRENT HR HE: 131
CV STRESS CURRENT HR HE: 132
CV STRESS CURRENT HR HE: 132
CV STRESS CURRENT HR HE: 140
CV STRESS CURRENT HR HE: 151
CV STRESS CURRENT HR HE: 151
CV STRESS CURRENT HR HE: 152
CV STRESS CURRENT HR HE: 55
CV STRESS CURRENT HR HE: 62
CV STRESS CURRENT HR HE: 63
CV STRESS CURRENT HR HE: 64
CV STRESS CURRENT HR HE: 68
CV STRESS CURRENT HR HE: 72
CV STRESS CURRENT HR HE: 73
CV STRESS CURRENT HR HE: 75
CV STRESS CURRENT HR HE: 78
CV STRESS CURRENT HR HE: 79
CV STRESS CURRENT HR HE: 90
CV STRESS CURRENT HR HE: 93
CV STRESS CURRENT HR HE: 96
CV STRESS CURRENT HR HE: 96
CV STRESS DEVIATION TIME HE: NORMAL
CV STRESS ECHO PERCENT HR HE: NORMAL
CV STRESS EXERCISE STAGE HE: NORMAL
CV STRESS EXERCISE STAGE REACHED HE: NORMAL
CV STRESS FINAL RESTING BP HE: NORMAL
CV STRESS FINAL RESTING HR HE: 63
CV STRESS MAX HR HE: 155
CV STRESS MAX TREADMILL GRADE HE: 16
CV STRESS MAX TREADMILL SPEED HE: 4.2
CV STRESS PEAK DIA BP HE: NORMAL
CV STRESS PEAK SYS BP HE: NORMAL
CV STRESS PHASE HE: NORMAL
CV STRESS PROTOCOL HE: NORMAL
CV STRESS RESTING PT POSITION HE: NORMAL
CV STRESS RESTING PT POSITION HE: NORMAL
CV STRESS ST DEVIATION AMOUNT HE: NORMAL
CV STRESS ST DEVIATION ELEVATION HE: NORMAL
CV STRESS ST EVELATION AMOUNT HE: NORMAL
CV STRESS TEST TYPE HE: NORMAL
CV STRESS TOTAL STAGE TIME MIN 1 HE: NORMAL
NUC STRESS EJECTION FRACTION: 69 %
RATE PRESSURE PRODUCT: NORMAL
STRESS ECHO BASELINE DIASTOLIC HE: 82
STRESS ECHO BASELINE HR: 61
STRESS ECHO BASELINE SYSTOLIC BP: 148
STRESS ECHO CALCULATED PERCENT HR: 101 %
STRESS ECHO LAST STRESS DIASTOLIC BP: 78
STRESS ECHO LAST STRESS HR: 151
STRESS ECHO LAST STRESS SYSTOLIC BP: 160
STRESS ECHO POST ESTIMATED WORKLOAD: 11.9
STRESS ECHO POST EXERCISE DUR MIN: 10
STRESS ECHO POST EXERCISE DUR SEC: 10
STRESS ECHO TARGET HR: 154
STRESS ST DEPRESSION: 4 MM

## 2021-02-04 ENCOUNTER — OFFICE VISIT - HEALTHEAST (OUTPATIENT)
Dept: CARDIOLOGY | Facility: CLINIC | Age: 66
End: 2021-02-04

## 2021-02-04 DIAGNOSIS — I48.0 PAROXYSMAL ATRIAL FIBRILLATION (H): ICD-10-CM

## 2021-02-05 ENCOUNTER — AMBULATORY - HEALTHEAST (OUTPATIENT)
Dept: CARDIOLOGY | Facility: HOSPITAL | Age: 66
End: 2021-02-05

## 2021-02-05 ENCOUNTER — OFFICE VISIT - HEALTHEAST (OUTPATIENT)
Dept: CARDIOLOGY | Facility: CLINIC | Age: 66
End: 2021-02-05

## 2021-02-05 ENCOUNTER — SURGERY - HEALTHEAST (OUTPATIENT)
Dept: CARDIOLOGY | Facility: CLINIC | Age: 66
End: 2021-02-05

## 2021-02-05 ENCOUNTER — AMBULATORY - HEALTHEAST (OUTPATIENT)
Dept: CARDIOLOGY | Facility: CLINIC | Age: 66
End: 2021-02-05

## 2021-02-05 DIAGNOSIS — Z11.59 ENCOUNTER FOR SCREENING FOR OTHER VIRAL DISEASES: ICD-10-CM

## 2021-02-05 DIAGNOSIS — R94.39 ABNORMAL STRESS TEST: ICD-10-CM

## 2021-02-05 ASSESSMENT — MIFFLIN-ST. JEOR: SCORE: 1648.08

## 2021-02-07 ENCOUNTER — AMBULATORY - HEALTHEAST (OUTPATIENT)
Dept: FAMILY MEDICINE | Facility: CLINIC | Age: 66
End: 2021-02-07

## 2021-02-07 DIAGNOSIS — Z11.59 ENCOUNTER FOR SCREENING FOR OTHER VIRAL DISEASES: ICD-10-CM

## 2021-02-08 ENCOUNTER — OFFICE VISIT - HEALTHEAST (OUTPATIENT)
Dept: CARDIOLOGY | Facility: CLINIC | Age: 66
End: 2021-02-08

## 2021-02-08 DIAGNOSIS — I48.0 PAROXYSMAL ATRIAL FIBRILLATION (H): ICD-10-CM

## 2021-02-08 LAB
SARS-COV-2 PCR COMMENT: NORMAL
SARS-COV-2 RNA SPEC QL NAA+PROBE: NEGATIVE
SARS-COV-2 VIRUS SPECIMEN SOURCE: NORMAL

## 2021-02-09 ENCOUNTER — COMMUNICATION - HEALTHEAST (OUTPATIENT)
Dept: SCHEDULING | Facility: CLINIC | Age: 66
End: 2021-02-09

## 2021-02-11 ENCOUNTER — SURGERY - HEALTHEAST (OUTPATIENT)
Dept: CARDIOLOGY | Facility: HOSPITAL | Age: 66
End: 2021-02-11

## 2021-02-11 ASSESSMENT — MIFFLIN-ST. JEOR: SCORE: 1611.8

## 2021-02-12 ENCOUNTER — OFFICE VISIT - HEALTHEAST (OUTPATIENT)
Dept: CARDIOLOGY | Facility: CLINIC | Age: 66
End: 2021-02-12

## 2021-02-12 ENCOUNTER — COMMUNICATION - HEALTHEAST (OUTPATIENT)
Dept: CARDIOLOGY | Facility: CLINIC | Age: 66
End: 2021-02-12

## 2021-02-12 DIAGNOSIS — I48.0 PAROXYSMAL ATRIAL FIBRILLATION (H): ICD-10-CM

## 2021-02-22 ENCOUNTER — OFFICE VISIT - HEALTHEAST (OUTPATIENT)
Dept: CARDIOLOGY | Facility: CLINIC | Age: 66
End: 2021-02-22

## 2021-02-22 DIAGNOSIS — I48.0 PAROXYSMAL ATRIAL FIBRILLATION (H): ICD-10-CM

## 2021-05-26 ENCOUNTER — RECORDS - HEALTHEAST (OUTPATIENT)
Dept: ADMINISTRATIVE | Facility: CLINIC | Age: 66
End: 2021-05-26

## 2021-05-27 ENCOUNTER — RECORDS - HEALTHEAST (OUTPATIENT)
Dept: ADMINISTRATIVE | Facility: CLINIC | Age: 66
End: 2021-05-27

## 2021-05-27 NOTE — TELEPHONE ENCOUNTER
Unable to leave a voice message but if patient calls back please relay the below message. I am informing the patient via a MyChart message now.       Jessica Causey [facility]  11:38 AM  3/28/2019

## 2021-05-27 NOTE — TELEPHONE ENCOUNTER
Who is calling:   Patient  Reason for Call:   Was seen in [facility] on 3/20 for a rib fractures.  Patient is wondering if there is something else he should be doing.  Please advise  Date of last appointment with primary care:   2/15/2019  Okay to leave a detailed message: Yes  My chart message ok]

## 2021-05-27 NOTE — TELEPHONE ENCOUNTER
Tell patient that there is not much additional he can do.  Rib pain is significant from a rib fracture and generally lasts a number of weeks.    I did review the clinic note from March 20, I agree with treatment plan as given in that note.

## 2021-05-28 ENCOUNTER — RECORDS - HEALTHEAST (OUTPATIENT)
Dept: ADMINISTRATIVE | Facility: CLINIC | Age: 66
End: 2021-05-28

## 2021-05-28 NOTE — TELEPHONE ENCOUNTER
Refill Approved    Rx renewed per Medication Renewal Policy. Medication was last renewed on 1/29/19.    Vonda Vasquez, [facility] [place] Connection Triage/Med Refill 5/13/2019     Requested Prescriptions   Pending Prescriptions Disp Refills     atorvastatin (LIPITOR) 80 MG tablet [Pharmacy Med Name: Atorvastatin Calcium Oral Tablet 80 MG] 30 tablet 2     Sig: TAKE ONE TABLET BY MOUTH NIGHTLY AT BEDTIME       Statins Refill Protocol (Hmg CoA Reductase Inhibitors) Passed - 5/13/2019  7:00 AM        Passed - PCP or prescribing provider visit in past 12 months      Last office visit with prescriber/PCP: Visit date not found OR same dept: 2/15/2019 Aki Bolaños MD OR same specialty: 2/15/2019 Aki Bolaños MD  Last physical: Visit date not found Last MTM visit: Visit date not found   Next visit within 3 mo: Visit date not found  Next physical within 3 mo: Visit date not found  Prescriber OR PCP: Britney Hayes MD  Last diagnosis associated with med order: 1. Hyperlipidemia  - atorvastatin (LIPITOR) 80 MG tablet [Pharmacy Med Name: Atorvastatin Calcium Oral Tablet 80 MG]; TAKE ONE TABLET BY MOUTH NIGHTLY AT BEDTIME  Dispense: 30 tablet; Refill: 2    If protocol passes may refill for 12 months if within 3 months of last provider visit (or a total of 15 months).

## 2021-05-29 ENCOUNTER — RECORDS - HEALTHEAST (OUTPATIENT)
Dept: ADMINISTRATIVE | Facility: CLINIC | Age: 66
End: 2021-05-29

## 2021-05-29 NOTE — PATIENT INSTRUCTIONS - HE
Check blood pressure on your own.  Make sure blood pressure is running less than 135/85.  Blood pressure running higher than 135/85, see me this summer to discuss restarting blood pressure medication.    You have been given referrals to ENT to discuss your ringing of the ears.  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    You have been given a referral to dermatology for a full body skin exam, which is recommended yearly for someone with a history of skin cancer.    Another referral has been placed for colonoscopy, [place] [facility] should contact you.  I would recommend you reduce your aspirin to 81 mg a day for the week prior to colonoscopy.  You do have the option of doing colon cancer screening with the Cologuard test.    Follow-up with me in 6 months for routine checkup and blood pressure reevaluation.

## 2021-05-29 NOTE — PROGRESS NOTES
[facility] Follow Up Note    Jose Lynn   63 y.o. male    Date of Visit: 6/4/2019    Chief Complaint   Patient presents with     Annual Exam     Subjective  Jose is here for health maintenance physical exam and review multiple medical issues.    His main issue is coronary artery disease with a drug-eluting stent to the proximal and mid LAD in 2010.  Drug eluting stent to the OM in 2012.  He does report a history of silent MI.    I did review the stress echo from June 2018 that did not show a wall motion abnormality, but there was some questionable distal anterior and apical ischemic changes noted.    He therefore underwent a cardiac catheterization in June 2018 which showed patent stents and normal ejection fraction and no intervention.    He has not had chest pain or exertional symptoms since then.    He does use the exercise bike 20 minutes a day with stable exertional ability.    He had a brief episode of paroxysmal H fibrillation in November 2018 was put on Rythmol.  Is not had any recurrent evidence of atrial fibrillation or palpitations.  He did see cardiology in April.  I did review the cardiology note from April, there was some discussion on possibly discontinuing the Rythmol, patient states that he was called back and told to stop it.    He had not been on anticoagulation.  No history of TIA or stroke.    No significant alcohol history.  No history of sleep apnea or diabetes.    He is never smoked.    He does have a family history of coronary disease and hypertension.    No generalized myalgias on Lipitor 80 mg.  No epigastric pain or bleeding on the aspirin 162 mg a day.    Review lab work from April 2018 with an LDL of 73 and HDL 45.    Blood pressure at cardiology appointment on April was 126/76.  Blood pressure in February was 118/82.    He has a past history of low blood pressure and was taken off lisinopril in the past.    He has not checked his blood pressure recently.    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    February 2019 creatinine was 0.87.    He is no longer on naproxen.  No significant muscle skeletal complaints.    No lower extremity edema.    His bowels are normal.  No abdominal pain.  No blood in stool or melena.    Colonoscopy in 2002 was negative.  No family history of colon cancer.  He still has not set up his colonoscopy.  The referral was placed in February.  He is having problems scheduling.  He did not want to do the Cologuard screening, but I did discuss it as an option.    Patient states that he had some type of skin cancer a number of years ago, does not remember what type, possibly a basal cell cancer.  He also describes a case being frozen.  He has not seen a dermatologist in over 2 years.  He has not noticed any new skin lesions.    Urinating normally perhaps 1 time a night nocturia.  April 2018 PSA was 0.2.    No headache complaints.  No vision changes and he saw the eye doctor about a year ago with no issues.  No mouth sores or swallowing difficulty.    Sleeps well at night.  No daytime sleepiness or mood or anxiety issues.    He is , independent living.  Multiple children and grandchildren.  Still works at the [employer], draws blood.    He does have a new complaint of some ringing in the ears over the past month.  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  No new vertigo or sudden hearing loss.  No ear pain.        PMHx:    Past Medical History:   Diagnosis Date     Cancer (H)     Kidney     Coronary artery disease      PSHx:    Past Surgical History:   Procedure Laterality Date     APPENDECTOMY       CORONARY ANGIOPLASTY WITH STENT PLACEMENT  2010     CORONARY ANGIOPLASTY WITH STENT PLACEMENT  2012     CV CORONARY ANGIOGRAM N/A 6/22/2018    Procedure: Coronary Angiogram;  Surgeon: Shanique Bourgeois MD;  Location:   "[facility];  Service:      CV LEFT HEART CATHETERIZATION WITH LEFT VENTRICULOGRAM N/A 6/22/2018    Procedure: Left Heart Catheterization with Left Ventriculogram;  Surgeon: Shanique Bourgeois MD;  Location: [facility];  Service:      FOOT FRACTURE SURGERY Right 02/15/2010     KNEE ARTHROSCOPY W/ MENISCECTOMY Left 07/01/2016     NEPHRECTOMY Left      Immunizations:   Immunization History   Administered Date(s) Administered     DT (pediatric) 01/01/1994, 04/22/2003     Hepatitis A, Peds, Unspecified 07/10/2000, 04/22/2004     Influenza, Seasonal, Inj PF IIV3 10/04/2011, 09/23/2013, 10/21/2014     Td,adult,historic,unspecified 04/22/2003, 12/05/2012     Tdap 12/05/2012     Typhoid, historic, Unspecified 04/22/2004     ZOSTER, LIVE 10/17/2016       ROS A comprehensive review of systems was performed and was otherwise negative    Medications, allergies, and problem list were reviewed and updated    Exam  /84 (Patient Site: Right Arm, Patient Position: Sitting, Cuff Size: Adult Large)   Pulse (!) 56   Resp 12   Ht 5' 9.75\" (1.772 m)   Wt 181 lb 3.2 oz (82.2 kg)   BMI 26.19 kg/m    Alert and oriented x3 with good mood and affect.  Pupils and irises equal and reactive.  Extra ocular muscles intact.  External ears and nose exam is normal.  Hepatic membranes appear normal.  There is moderate wax.  Pharynx is normal and not crowded.  No pharyngitis.  No leukoplakia.  Teeth in good condition.  No cervical or supraclavicular or axillary or inguinal adenopathy.  No JVD and no carotid bruits.  No thyromegaly or nodularity.  Lungs are clear to auscultation with normal respiratory excursion.  Heart is regular with no murmur rub or gallop.  Abdomen is nontender no hepatospleno megaly and no pulsatile mass.  No ankle edema and +1 posterior tibialis pulses.  Feet in good condition.  A small corn or wart plantar surface on his left foot that is not symptomatic.  Testicles normal bilaterally and no inguinal " hernia.  Prostate is smooth and symmetric without nodule.  Skin exam does not show any suspicious lesions.    Assessment/Plan  1. Healthcare maintenance  Main issue is cardiovascular risk.  Has known coronary artery disease.  Good exercise tolerance and asymptomatic.  Continue medical management.    10-year colonoscopy screening is overdue.  I did discuss option for Cologuard.  He wishes to proceed with colonoscopy but I did discuss increased risk of colonoscopy including bleeding and perforation risk.  Also discussed risk of being off aspirin with his drug-eluting stents.  I recommended he stay on a lower dose aspirin 81 mg and proceed with colonoscopy if he is going to do that.    He does wish to continue prostate cancer screening.    Continue to see the ophthalmologist every year or 2.    Could consider shingles vaccine.    Pneumovax immunization next year.    2. Coronary artery disease due to calcified coronary lesion  A symptomatically good exercise tolerance.  Continue medical management with Lipitor 80 mg and aspirin 162 mg a day.  I discussed bleeding risk of the aspirin.    3. Paroxysmal atrial fibrillation (H)  No evidence of recurrence.  Now off the Rythmol.  Patient told to follow-up if recurrent palpitations.    4. Hypercholesterolemia  Continue Lipitor 80 mg  - Lipid Cascade    5. Screen for colon cancer  As above  - Ambulatory referral for Colonoscopy    6. Medication monitoring encounter    - Comprehensive Metabolic Panel  - HM2(CBC w/o Differential)    7. Screening for prostate cancer    - PSA (Prostatic-Specific Antigen), Annual Screen    8. History of skin cancer  Reported distant history of skin cancer, possibly just AK's, refer to dermatology for full body skin exam.  - Ambulatory referral to Dermatology    9. 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  - Ambulatory referral to ENT    History of congenital missing right kidney,  previous kidney tumor removed but creatinine has been normal.    I suspect whitecoat hypertension, but if blood pressure is running above 1 3585 we will have him follow-up this summer, consider amlodipine or low-dose lisinopril.  He had been on lisinopril in the past but discontinued secondary to low blood pressure.    Return in about 6 months (around 12/4/2019) for Recheck.   Patient Instructions   Check blood pressure on your own.  Make sure blood pressure is running less than 135/85.  Blood pressure running higher than 135/85, see me this summer to discuss restarting blood pressure medication.    You have been given referrals to ENT to discuss your ringing of the ears.  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    You have been given a referral to dermatology for a full body skin exam, which is recommended yearly for someone with a history of skin cancer.    Another referral has been placed for colonoscopy, [place] [facility] should contact you.  I would recommend you reduce your aspirin to 81 mg a day for the week prior to colonoscopy.  You do have the option of doing colon cancer screening with the Cologuard test.    Follow-up with me in 6 months for routine checkup and blood pressure reevaluation.        Aki Bolaños MD        Current Outpatient Medications   Medication Sig Dispense Refill     aspirin 81 MG EC tablet Take 162 mg by mouth daily.        atorvastatin (LIPITOR) 80 MG tablet TAKE ONE TABLET BY MOUTH NIGHTLY AT BEDTIME 90 tablet 2     cholecalciferol, vitamin D3, 1,000 unit tablet Take 1,000 Units by mouth daily.       multivitamin therapeutic tablet Take 1 tablet by mouth daily.       omega-3/dha/epa/fish oil (FISH OIL-OMEGA-3 FATTY ACIDS) 300-1,000 mg capsule Take 2 g by mouth daily.       polyethylene glycol (MIRALAX) 17 gram packet Take 17 g by mouth daily.       sildenafil, antihypertensive, (REVATIO) 20 mg tablet Take 1 tablet (20 mg total) by mouth 3 (three)  times a day. (Patient taking differently: Take 20 mg by mouth as needed.       ) 90 tablet 1     No current facility-administered medications for this visit.      No Known Allergies  Social History     Tobacco Use     Smoking status: Never Smoker     Smokeless tobacco: Never Used   Substance Use Topics     Alcohol use: No     Drug use: No

## 2021-05-30 VITALS — WEIGHT: 186.9 LBS | BODY MASS INDEX: 26.76 KG/M2 | HEIGHT: 70 IN

## 2021-05-30 NOTE — TELEPHONE ENCOUNTER
"RN Triage:     Wife is calling in stating that patient woke up at 515am and had a bad a headache and vomiting blood. Per wife this happened twice and patient is now dry heaving. The vomit contained bright red blood and the second emesis contained clots. Disposition of ED now was given. Wife agreed and will drive.   Jing Snowden RN, BSN [facility] Triage Nurse      Reason for Disposition    [1] Vomiting AND [2] contains red blood or black (\"coffee ground\") material  (Exception: few red streaks in vomit that only happened once)    Protocols used: VOMITING-A-AH      "

## 2021-05-31 ENCOUNTER — RECORDS - HEALTHEAST (OUTPATIENT)
Dept: ADMINISTRATIVE | Facility: CLINIC | Age: 66
End: 2021-05-31

## 2021-06-01 ENCOUNTER — RECORDS - HEALTHEAST (OUTPATIENT)
Dept: ADMINISTRATIVE | Facility: CLINIC | Age: 66
End: 2021-06-01

## 2021-06-01 VITALS — HEIGHT: 70 IN | WEIGHT: 182 LBS | BODY MASS INDEX: 26.05 KG/M2

## 2021-06-01 VITALS — WEIGHT: 183 LBS | HEIGHT: 70 IN | BODY MASS INDEX: 26.2 KG/M2

## 2021-06-01 VITALS — WEIGHT: 179 LBS | HEIGHT: 70 IN | BODY MASS INDEX: 25.62 KG/M2

## 2021-06-02 VITALS — WEIGHT: 190 LBS | BODY MASS INDEX: 27.26 KG/M2

## 2021-06-02 VITALS — BODY MASS INDEX: 27 KG/M2 | WEIGHT: 188.2 LBS

## 2021-06-02 VITALS — WEIGHT: 188 LBS | BODY MASS INDEX: 26.98 KG/M2

## 2021-06-02 VITALS — BODY MASS INDEX: 26.83 KG/M2 | WEIGHT: 187 LBS

## 2021-06-02 NOTE — PROGRESS NOTES
Preoperative Exam    Scheduled Procedure: Mohs  Surgery Date:  10-21-19/10-23-19  Surgery Location: [facility]    Surgeon:  -    Assessment/Plan:     1. Malignant melanoma of skin of face (H)  He was told that he had a malignant lesion on his forehead during routine skin examination at dermatology.  He has elected to undergo Mohs procedure.    2. Preoperative examination  No contraindications for planned procedure.    3. Paroxysmal atrial fibrillation (H)  He had a brief episode in November 2018 and was on Rythmol for short while before this was discontinued.  He is on  mg daily and was told to reduce this to 81 mg 1 week prior to his procedure.  Otherwise not on any anticoagulation or rhythm/rate control    4. Coronary atherosclerosis due to calcified coronary lesion  He had an angiogram done in June 2018 which showed widely patent LAD and OM stents with moderate, unchanged distal LAD disease with unchanged moderate/severe distal RCA into PL disease.  EKG from July 2019 showed sinus bradycardia but was otherwise normal        Surgical Procedure Risk: Low (reported cardiac risk generally < 1%)  Have you had prior anesthesia?: Yes  Have you or any family members had a previous anesthesia reaction:  Yes: Sick  Do you or any family members have a history of a clotting or bleeding disorder?: No  Cardiac Risk Assessment: increased risk for major cardiac complications based on  myocardial infarction history    APPROVAL GIVEN to proceed with proposed procedure, without further diagnostic evaluation    Please Note:  No    Functional Status: Independent  Patient plans to recover at home with family.     Subjective:      Jose Lynn is a 64 y.o. male who presents for a preoperative consultation.      Please see above.  The patient is otherwise feeling well and has no acute complaints.    All other systems reviewed and are negative, other than those listed in the HPI.    Pertinent History  Do you have  difficulty breathing or chest pain after walking up a flight of stairs: No  History of obstructive sleep apnea: No  Steroid use in the last 6 months: No  Frequent Aspirin/NSAID use: Yes: Please see above.   Prior Blood Transfusion: Yes: Kidney Cancer  Prior Blood Transfusion Reaction: No  If for some reason prior to, during or after the procedure, if it is medically indicated, would you be willing to have a blood transfusion?:  There is no transfusion refusal.    Current Outpatient Medications   Medication Sig Dispense Refill     aspirin 81 MG EC tablet Take 162 mg by mouth daily.        atorvastatin (LIPITOR) 80 MG tablet TAKE ONE TABLET BY MOUTH NIGHTLY AT BEDTIME 90 tablet 2     cholecalciferol, vitamin D3, 1,000 unit tablet Take 1,000 Units by mouth daily.       multivitamin therapeutic tablet Take 1 tablet by mouth daily.       omega-3/dha/epa/fish oil (FISH OIL-OMEGA-3 FATTY ACIDS) 300-1,000 mg capsule Take 2 g by mouth daily.       polyethylene glycol (MIRALAX) 17 gram packet Take 17 g by mouth daily.       sildenafil, antihypertensive, (REVATIO) 20 mg tablet Take 1 tablet (20 mg total) by mouth 3 (three) times a day. (Patient taking differently: Take 20 mg by mouth as needed.       ) 90 tablet 1     omeprazole (PRILOSEC) 20 MG capsule Take 1 capsule (20 mg total) by mouth daily before breakfast. 30 capsule 0     ranitidine (ZANTAC) 150 MG capsule Take 1 capsule (150 mg total) by mouth daily. 30 capsule 0     No current facility-administered medications for this visit.         No Known Allergies    Patient Active Problem List   Diagnosis     Dyslipidemia, goal LDL below 70     Coronary Artery Disease     Kidney Cancer     Basal Cell Carcinoma Of The Skin     Abnormal stress echocardiogram     Coronary artery disease involving native coronary artery of native heart with other form of angina pectoris (H)     Palpitations     History of coronary artery disease     Dyslipidemia     Paroxysmal atrial fibrillation  (H)       Past Medical History:   Diagnosis Date     Cancer (H)     Kidney     Coronary artery disease        Past Surgical History:   Procedure Laterality Date     APPENDECTOMY       CORONARY ANGIOPLASTY WITH STENT PLACEMENT  2010     CORONARY ANGIOPLASTY WITH STENT PLACEMENT  2012     CV CORONARY ANGIOGRAM N/A 6/22/2018    Procedure: Coronary Angiogram;  Surgeon: Shanique Bourgeois MD;  Location: [facility];  Service:      CV LEFT HEART CATHETERIZATION WITH LEFT VENTRICULOGRAM N/A 6/22/2018    Procedure: Left Heart Catheterization with Left Ventriculogram;  Surgeon: Shanique Bourgeois MD;  Location: [facility];  Service:      FOOT FRACTURE SURGERY Right 02/15/2010     KNEE ARTHROSCOPY W/ MENISCECTOMY Left 07/01/2016     NEPHRECTOMY Left        Social History     Socioeconomic History     Marital status:      Spouse name: Not on file     Number of children: Not on file     Years of education: Not on file     Highest education level: Not on file   Occupational History     Employer: [employer]   Social Needs     Financial resource strain: Not on file     Food insecurity:     Worry: Not on file     Inability: Not on file     Transportation needs:     Medical: Not on file     Non-medical: Not on file   Tobacco Use     Smoking status: Never Smoker     Smokeless tobacco: Never Used   Substance and Sexual Activity     Alcohol use: No     Drug use: No     Sexual activity: Yes     Partners: Female   Lifestyle     Physical activity:     Days per week: 7 days     Minutes per session: 20 min     Stress: Not on file   Relationships     Social connections:     Talks on phone: Not on file     Gets together: Not on file     Attends Anabaptism service: Not on file     Active member of club or organization: Not on file     Attends meetings of clubs or organizations: Not on file     Relationship status: Not on file     Intimate partner violence:     Fear of current or ex partner: Not on file      "Emotionally abused: Not on file     Physically abused: Not on file     Forced sexual activity: Not on file   Other Topics Concern     Not on file   Social History Narrative     Not on file       Patient Care Team:  Aki Bolaños MD as PCP - General (Internal Medicine)  Aki Bolaños MD as Assigned PCP          Objective:     Vitals:    10/18/19 0819   BP: 141/70   Pulse: (!) 58   SpO2: 98%   Weight: 188 lb 8 oz (85.5 kg)   Height: 5' 9.75\" (1.772 m)         Physical Exam:  General: No apparent distress. Calm. Alert and Oriented X3. Pt behavior is appropriate.  Head:Atraumatic. Normocephalic, non-tender to palpation  Neck: Supple. No JVD. Full ROM.   Eyes: PERRL, No discharge. No strabismus. No nystagmus.  Ears: TMs pearly gray with landmarks visible.   Nose/Mouth/Throat: Patent nares, no oral lesions, pharynx clear and without exudate. Uvula mid-line. Nasal septum mid-line. Clear turbinates.   Lymph: No axillar or cervical adenopathy.   Chest/Lungs: Normal chest wall, clear to auscultation, normal respiratory effort and rate.   Heart/Pulses: Regular rate and rhythm, strong and equal radial pulses, no murmurs. Capillary refill <2 seconds. No edema.   Abdomen: Soft, no palpable masses. No hepatosplenomegaly, no tenderness with palpation noted. Bowel sounds active in all quadrants. No increased tympany.   Genitalia: Not examined.   Musculoskeletal: No CVA tenderness with palpation. Good ROM with extremities.   Neurologic: Interactive, alert, no focal findings, CNs intact.   Skin: Warm, dry. Normal hair pattern. Free of lesions. Normal skin turgor.         Patient Instructions     Hold all supplements, aspirin and NSAIDs for 7 days prior to surgery.  Follow your surgeon's direction on when to stop eating and drinking prior to surgery.  Your surgeon will be managing your pain after your surgery.    Remove all jewelry and metal piercings before your surgery.   Remove nail polish from fingers before surgery.      EKG:  "   Most Recent EKG     Units 07/02/19  0626   VENTRATE BPM 54   ATRIALRATE BPM 54   QRSDURATION ms 86   QTINTERVAL ms 428   QTCCALC ms 405   P [place] degrees 6   RAXIS degrees 49   TAXIS degrees 46   MUSEDX  Sinus bradycardia  Otherwise normal ECG  When compared with ECG of 25-NOV-2018 06:03,  No significant change was found  Confirmed by HARSHA RUBI MD LOC: ([id number]) on 7/2/2019 3:51:20 PM           Labs:  No labs were ordered during this visit    Immunization History   Administered Date(s) Administered     DT (pediatric) 01/01/1994, 04/22/2003     Hepatitis A, Peds, Unspecified 07/10/2000, 04/22/2004     Influenza, Seasonal, Inj PF IIV3 10/04/2011, 09/23/2013, 10/21/2014     Td,adult,historic,unspecified 04/22/2003, 12/05/2012     Tdap 12/05/2012     Typhoid, historic, Unspecified 04/22/2004     ZOSTER, LIVE 10/17/2016           Electronically signed by Maikol Yanez CNP 10/18/19 8:23 AM

## 2021-06-02 NOTE — TELEPHONE ENCOUNTER
Left message informing pt that unfortunately we do not have any openings with PCP or another internist here at [facility] prior to his surgery date. We can offer one of our FM clinicians. That is really his only option at [facility]. Please assist pt with scheduling when he calls back

## 2021-06-02 NOTE — TELEPHONE ENCOUNTER
New Appointment Needed  What is the reason for the visit:    Pre-Op Appt Request  When is the surgery? :    Mohs Surgery 10/21 and 10/23 with [facility]  Where is the surgery?: [facility]   Who is the surgeon? :  Dr Shaw, on 10/23, it will be with Dr. Stewart who is doing plastic surgery after and he is at [facility] and he is a plastic surgeon   What type of surgery is being done?: Mohs, and plastic surgery after Mohs   Provider Preference: Javon   Patient would like to get his pre op done on 10/18/19- Friday,  anytime during the day with Dr. Bolaños     Waitlist offered?: No  Okay to leave a detailed message: No

## 2021-06-03 VITALS — BODY MASS INDEX: 26.97 KG/M2 | HEIGHT: 70 IN | WEIGHT: 188.4 LBS

## 2021-06-03 VITALS — BODY MASS INDEX: 25.94 KG/M2 | WEIGHT: 181.2 LBS | HEIGHT: 70 IN

## 2021-06-03 VITALS
DIASTOLIC BLOOD PRESSURE: 70 MMHG | HEART RATE: 58 BPM | SYSTOLIC BLOOD PRESSURE: 141 MMHG | OXYGEN SATURATION: 98 % | BODY MASS INDEX: 26.99 KG/M2 | HEIGHT: 70 IN | WEIGHT: 188.5 LBS

## 2021-06-03 NOTE — TELEPHONE ENCOUNTER
Patient's wife states that  believes he slept wrong Monday night and started with neck pain on Tuesday.  Pain and stiffness has increased to current level of 5 on 0-10 scale to back of neck with accompanying headache.  He denies fever, chills, or sweating.  He tried tylenol at 1900 hrs without any helpful effect.  He cannot touch his chin to his chest and has great difficulty trying.  Care advice given.  They will go to ER with wife driving.    Erica Olivares RN, Triage      Reason for Disposition    [1] Stiff neck (can't put chin to chest) AND [2] headache    Protocols used: NECK PAIN OR [id number]

## 2021-06-04 ENCOUNTER — OFFICE VISIT - HEALTHEAST (OUTPATIENT)
Dept: FAMILY MEDICINE | Facility: CLINIC | Age: 66
End: 2021-06-04

## 2021-06-04 VITALS
RESPIRATION RATE: 18 BRPM | OXYGEN SATURATION: 98 % | BODY MASS INDEX: 26.81 KG/M2 | DIASTOLIC BLOOD PRESSURE: 87 MMHG | HEART RATE: 50 BPM | TEMPERATURE: 98.3 F | WEIGHT: 185.5 LBS | SYSTOLIC BLOOD PRESSURE: 140 MMHG

## 2021-06-04 VITALS
TEMPERATURE: 98.3 F | DIASTOLIC BLOOD PRESSURE: 64 MMHG | OXYGEN SATURATION: 97 % | BODY MASS INDEX: 26.49 KG/M2 | WEIGHT: 184.6 LBS | SYSTOLIC BLOOD PRESSURE: 120 MMHG | HEART RATE: 53 BPM

## 2021-06-04 VITALS
RESPIRATION RATE: 16 BRPM | SYSTOLIC BLOOD PRESSURE: 130 MMHG | BODY MASS INDEX: 26.63 KG/M2 | WEIGHT: 186 LBS | HEIGHT: 70 IN | DIASTOLIC BLOOD PRESSURE: 82 MMHG | HEART RATE: 52 BPM

## 2021-06-04 VITALS — BODY MASS INDEX: 25.98 KG/M2 | HEIGHT: 70 IN | WEIGHT: 181.5 LBS

## 2021-06-04 VITALS
SYSTOLIC BLOOD PRESSURE: 122 MMHG | WEIGHT: 185 LBS | RESPIRATION RATE: 14 BRPM | HEART RATE: 50 BPM | OXYGEN SATURATION: 98 % | BODY MASS INDEX: 26.54 KG/M2 | DIASTOLIC BLOOD PRESSURE: 80 MMHG

## 2021-06-04 VITALS
DIASTOLIC BLOOD PRESSURE: 74 MMHG | WEIGHT: 186 LBS | HEART RATE: 64 BPM | BODY MASS INDEX: 26.69 KG/M2 | SYSTOLIC BLOOD PRESSURE: 110 MMHG

## 2021-06-04 VITALS
SYSTOLIC BLOOD PRESSURE: 143 MMHG | WEIGHT: 184 LBS | TEMPERATURE: 97.9 F | HEART RATE: 52 BPM | RESPIRATION RATE: 16 BRPM | OXYGEN SATURATION: 98 % | DIASTOLIC BLOOD PRESSURE: 83 MMHG | BODY MASS INDEX: 26.4 KG/M2

## 2021-06-04 VITALS
SYSTOLIC BLOOD PRESSURE: 122 MMHG | OXYGEN SATURATION: 98 % | DIASTOLIC BLOOD PRESSURE: 76 MMHG | HEART RATE: 71 BPM | TEMPERATURE: 98.2 F | BODY MASS INDEX: 27.26 KG/M2 | RESPIRATION RATE: 14 BRPM | WEIGHT: 190 LBS

## 2021-06-04 DIAGNOSIS — R31.0 GROSS HEMATURIA: ICD-10-CM

## 2021-06-04 DIAGNOSIS — Z23 NEED FOR VACCINATION: ICD-10-CM

## 2021-06-04 LAB
ALBUMIN UR-MCNC: NEGATIVE G/DL
APPEARANCE UR: CLEAR
BACTERIA #/AREA URNS HPF: ABNORMAL /[HPF]
BILIRUB UR QL STRIP: NEGATIVE
COLOR UR AUTO: YELLOW
GLUCOSE UR STRIP-MCNC: NEGATIVE MG/DL
HGB UR QL STRIP: ABNORMAL
KETONES UR STRIP-MCNC: NEGATIVE MG/DL
LEUKOCYTE ESTERASE UR QL STRIP: NEGATIVE
NITRATE UR QL: NEGATIVE
PH UR STRIP: 7 [PH] (ref 5–8)
RBC #/AREA URNS AUTO: ABNORMAL HPF
SP GR UR STRIP: 1.01 (ref 1–1.03)
SQUAMOUS #/AREA URNS AUTO: ABNORMAL LPF
UROBILINOGEN UR STRIP-ACNC: ABNORMAL
WBC #/AREA URNS AUTO: ABNORMAL HPF

## 2021-06-05 VITALS
HEIGHT: 70 IN | RESPIRATION RATE: 14 BRPM | BODY MASS INDEX: 27.2 KG/M2 | SYSTOLIC BLOOD PRESSURE: 140 MMHG | HEART RATE: 64 BPM | DIASTOLIC BLOOD PRESSURE: 70 MMHG | WEIGHT: 190 LBS

## 2021-06-05 VITALS
WEIGHT: 190 LBS | HEART RATE: 74 BPM | OXYGEN SATURATION: 98 % | RESPIRATION RATE: 16 BRPM | SYSTOLIC BLOOD PRESSURE: 138 MMHG | BODY MASS INDEX: 27.2 KG/M2 | DIASTOLIC BLOOD PRESSURE: 84 MMHG | HEIGHT: 70 IN

## 2021-06-05 VITALS — BODY MASS INDEX: 26.05 KG/M2 | WEIGHT: 182 LBS | HEIGHT: 70 IN

## 2021-06-05 NOTE — TELEPHONE ENCOUNTER
Refill Approved    Rx renewed per Medication Renewal Policy. Medication was last renewed on 5/13/19.    Vonda Vasquez, [facility] Triage/Med Refill 1/16/2020     Requested Prescriptions   Pending Prescriptions Disp Refills     atorvastatin (LIPITOR) 80 MG tablet 90 tablet 2     Sig: Take 1 tablet (80 mg total) by mouth at bedtime.       Statins Refill Protocol (Hmg CoA Reductase Inhibitors) Passed - 1/14/2020  9:41 AM        Passed - PCP or prescribing provider visit in past 12 months      Last office visit with prescriber/PCP: 2/15/2019 Aik Bolaños MD OR same dept: 2/15/2019 Aki Bolaños MD OR same specialty: 2/15/2019 Aki Bolaños MD  Last physical: 6/4/2019 Last MTM visit: Visit date not found   Next visit within 3 mo: Visit date not found  Next physical within 3 mo: Visit date not found  Prescriber OR PCP: Aki Bolaños MD  Last diagnosis associated with med order: 1. Hyperlipidemia  - atorvastatin (LIPITOR) 80 MG tablet; Take 1 tablet (80 mg total) by mouth at bedtime.  Dispense: 90 tablet; Refill: 2    If protocol passes may refill for 12 months if within 3 months of last provider visit (or a total of 15 months).

## 2021-06-09 NOTE — PATIENT INSTRUCTIONS - HE
Right-sided otitis media, symptoms started about 3 or 4 weeks ago (early/mid June), with physical exam showing inflamed right tympanic membrane and exudate    Will treat with amoxicillin 500 mg 3 times a day for 10 days.  He has no antibiotic allergies.  He did have an episode of vomiting last night, and I told him that perhaps he could be a manifestation of the ear infection.    I told him to let me know if his ear is not improving within 5 to 7 days.  I expect complete resolution.    He describes a longer term issue of decreased auditory acuity that is probably involving both of his ears.  Once he is recovered from this ear infection, he might consider seeing an audiologist, and be evaluated for hearing aids.    Otherwise he feels well.  Cardiac status stable.    Coronary artery disease, previous coronary stenting, on high intensity statin and aspirin.  Good low pressure control.    History of paroxysmal atrial fibrillation of over 2018, has not had problems with rhythm recently.    History of renal cell cancer, resected from left kidney 1997.    Erectile dysfunction, he has gotten good results from sildenafil 80 mg (4 tablets of 20 mg each).  I renewed the prescription for him.    Pneumococcal vaccination to be administered today.  I told him that Streptococcus pneumoniae is a potential ear infection pathogen, so it makes even more sense to vaccinate him.

## 2021-06-09 NOTE — TELEPHONE ENCOUNTER
Ear pain right a week.    Would like to be seen as he has wax issue.    Transferred to scheduling for an appointment.    Shayna Winters RN [facility] Triage Nurse Advisor      Reason for Disposition    Earache persists > 1 hour    Additional Information    Negative: Ear pain is main symptom    Negative: Hearing loss (complete or partial) is main symptom    Negative: Earwax is the main concern    Negative: [1] Has nasal allergies AND [2] they are acting up    Negative: Pus or cloudy discharge from ear canal    Protocols used: EAR - CONGESTION-A-AH

## 2021-06-09 NOTE — PROGRESS NOTES
Office Visit - Follow Up   Jose Lynn   64 y.o. male    Date of Visit: 7/9/2020    Chief Complaint   Patient presents with     Cerumen Impaction     Right ear follow up        Assessment and Plan     Right-sided otitis media, symptoms started about 3 or 4 weeks ago (early/mid June), with physical exam showing inflamed right tympanic membrane and exudate    Will treat with amoxicillin 500 mg 3 times a day for 10 days.  He has no antibiotic allergies.  He did have an episode of vomiting last night, and I told him that perhaps he could be a manifestation of the ear infection.    I told him to let me know if his ear is not improving within 5 to 7 days.  I expect complete resolution.    He describes a longer term issue of decreased auditory acuity that is probably involving both of his ears.  Once he is recovered from this ear infection, he might consider seeing an audiologist, and be evaluated for hearing aids.    Otherwise he feels well.  Cardiac status stable.    Coronary artery disease, previous coronary stenting, on high intensity statin and aspirin.  Good low pressure control.    History of paroxysmal atrial fibrillation of over 2018, has not had problems with rhythm recently.    History of renal cell cancer, resected from left kidney 1997.    Erectile dysfunction, he has gotten good results from sildenafil 80 mg (4 tablets of 20 mg each).  I renewed the prescription for him.    Pneumococcal vaccination to be administered today.  I told him that Streptococcus pneumoniae is a potential ear infection pathogen, so it makes even more sense to vaccinate him.         History of Present Illness   This 64 y.o. old man comes in because of right ear pain      Ear pain right a week.  No discharge from the ear.  He recalls a history of having had middle ear infection on the right in the past years    He reports decreased hearing acuity both ears, longer-term gradual decline    12-8-19  1. 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  Lab Results   Component Value Date    CREATININE 1.04 11/07/2019     History of malignant melanoma of skin of face (H)    paroxysmal atrial fibrillation (H)  He had a brief episode in November 2018 and was on Rythmol for short while before this was discontinued.  He is on  mg daily and was told to reduce this to 81 mg 1 week prior to his procedure.  Otherwise not on any anticoagulation or rhythm/rate control     Coronary atherosclerosis due to calcified coronary lesion  He had an angiogram done in June 2018 which showed widely patent LAD and OM stents with moderate, unchanged distal LAD disease with unchanged moderate/severe distal RCA into PL disease.  EKG from July 2019 showed sinus bradycardia but was otherwise normal    drug-eluting stent to the proximal and mid LAD in 2010.  Drug eluting stent to the OM in 2012.   history of silent MI.      Wt Readings from Last 3 Encounters:   07/09/20 184 lb 9.6 oz (83.7 kg)   06/25/20 186 lb (84.4 kg)   12/08/19 190 lb (86.2 kg)     BP Readings from Last 3 Encounters:   07/09/20 120/64   06/25/20 110/74   12/08/19 122/76       Lab Results   Component Value Date    WBC 7.5 11/07/2019    HGB 12.3 (L) 11/07/2019    HCT 36.2 (L) 11/07/2019     11/07/2019    CHOL 128 06/04/2019    TRIG 49 06/04/2019    HDL 52 06/04/2019    LDLDIRECT 50 01/15/2013    ALT 28 07/02/2019    AST 32 07/02/2019     11/07/2019    K 3.7 11/07/2019     11/07/2019    CREATININE 1.04 11/07/2019    BUN 19 11/07/2019    CO2 27 11/07/2019    TSH 2.15 11/25/2018    PSA 0.1 06/04/2019    INR 0.96 11/07/2019        Review of Systems: A comprehensive review of systems was negative except as noted.     Medications, Allergies, Social, and Problem  List   Current Outpatient Medications   Medication Sig Dispense Refill     aspirin 81 MG EC tablet Take 162 mg by mouth daily.        atorvastatin (LIPITOR) 80 MG tablet TAKE 1 TABLET BY MOUTH EVERYDAY AT BEDTIME 90 tablet 1     cholecalciferol, vitamin D3, 1,000 unit tablet Take 1,000 Units by mouth daily.       multivitamin therapeutic tablet Take 1 tablet by mouth daily.       omega-3/dha/epa/fish oil (FISH OIL-OMEGA-3 FATTY ACIDS) 300-1,000 mg capsule Take 2 g by mouth daily.       polyethylene glycol (MIRALAX) 17 gram packet Take 17 g by mouth daily.       ranitidine (ZANTAC) 150 MG capsule Take 1 capsule (150 mg total) by mouth daily. 30 capsule 0     sildenafil, antihypertensive, (REVATIO) 20 mg tablet Take 1 tablet (20 mg total) by mouth 3 (three) times a day. (Patient taking differently: Take 20 mg by mouth as needed.       ) 90 tablet 1     No current facility-administered medications for this visit.      No Known Allergies  Social History     Tobacco Use     Smoking status: Never Smoker     Smokeless tobacco: Never Used   Substance Use Topics     Alcohol use: No     Drug use: No     Patient Active Problem List   Diagnosis     Dyslipidemia, goal LDL below 70     Coronary Artery Disease     Kidney Cancer     Basal Cell Carcinoma Of The Skin     Abnormal stress echocardiogram     Coronary artery disease involving native coronary artery of native heart with other form of angina pectoris (H)     Palpitations     History of coronary artery disease     Dyslipidemia     Paroxysmal atrial fibrillation (H)        Reviewed, reconciled and updated       Physical Exam   General Appearance: Very pleasant, does not appear systemically ill, breathing comfortably, no good blood pressure and normal temperature.    /64 (Patient Site: Right Arm, Patient Position: Sitting, Cuff Size: Adult Large)   Pulse (!) 53   Temp 98.3  F (36.8  C) (Oral)   Wt 184 lb 9.6 oz (83.7 kg)   SpO2 97%   BMI 26.68 kg/m      + Right  tympanic canal is red, and I believe I see some white exudate behind it.  No mastoid tenderness  No cervical adenopathy  Lungs clear  Heart regular rate and rhythm  Abdomen nontender  Extremities no edema     Additional Information        Daniel Alex MD

## 2021-06-09 NOTE — PROGRESS NOTES
Assessment:   1. Bilateral impacted cerumen  Cerumen Impaction without otitis externa.      Plan:   1. Care instructions given.  2. Home treatment: none. Keep ear dry and away from water  3. Follow-up in 10 to 14 days for a recheck.      Subjective:   Jose Lynn is a 64 y.o. male whom am seeing for evaluation of diminished hearing and discharge in the right ear for the past 4 days. There is not a prior history of cerumen impaction. The patient has been using ear drops to loosen wax immediately prior to this visit. The patient denies ear pain.    The following portions of the patient's history were reviewed and updated as appropriate: allergies, current medications, past family history, past medical history, past social history, past surgical history and problem list.    Review of Systems  A 12 point comprehensive review of systems was negative except as noted.      Objective:      Auditory canal(s) of the right ear are completely obstructed with cerumen.     Cerumen was removed using gentle irrigation and soft plastic curettes. Tympanic membranes are intact following the procedure.  Auditory canals are inflamed.

## 2021-06-09 NOTE — TELEPHONE ENCOUNTER
Refill Approved    Rx renewed per Medication Renewal Policy. Medication was last renewed on 1/6/2020.    Carolynn Puentes, [facility] Triage/Med Refill 7/2/2020     Requested Prescriptions   Pending Prescriptions Disp Refills     atorvastatin (LIPITOR) 80 MG tablet [Pharmacy Med Name: ATORVASTATIN 80 MG TABLET] 90 tablet 1     Sig: TAKE 1 TABLET BY MOUTH EVERYDAY AT BEDTIME       Statins Refill Protocol (Hmg CoA Reductase Inhibitors) Passed - 7/2/2020 12:21 AM        Passed - PCP or prescribing provider visit in past 12 months      Last office visit with prescriber/PCP: 2/15/2019 Aki Bolaños MD OR same dept: Visit date not found OR same specialty: 2/15/2019 Aki Bolaños MD  Last physical: 6/4/2019 Last MTM visit: Visit date not found   Next visit within 3 mo: Visit date not found  Next physical within 3 mo: Visit date not found  Prescriber OR PCP: Aki Bolaños MD  Last diagnosis associated with med order: 1. Hyperlipidemia  - atorvastatin (LIPITOR) 80 MG tablet [Pharmacy Med Name: ATORVASTATIN 80 MG TABLET]; TAKE 1 TABLET BY MOUTH EVERYDAY AT BEDTIME  Dispense: 90 tablet; Refill: 1    If protocol passes may refill for 12 months if within 3 months of last provider visit (or a total of 15 months).

## 2021-06-09 NOTE — PROGRESS NOTES
"ASSESSMENT:  1. Routine general medical examination at a health care facility  Will be getting colon, derm, eye, etc on rountine basis    2. Coronary atherosclerosis  Stable on current meds.  Check labs.   - Comprehensive Metabolic Panel  - HM2(CBC w/o Differential)  - Lipid Cascade    3. Malignant neoplasm of kidney excluding renal pelvis  I think we should repeat scans as screening with his history.      - CT Chest Abdomen Pelvis With Oral With IV Cont; Future  - Comprehensive Metabolic Panel  - HM2(CBC w/o Differential)  - Urinalysis    4. Special screening for malignant neoplasm of prostate    - PSA (Prostatic-Specific Antigen), Annual Screen    5. Cholesteatoma, right  New finding and should see ENT  - Ambulatory referral to ENT    ED-  No great new plan for this.    PLAN:  Patient Instructions   Due for a colonoscopy- #[phone]    ENT- #[phone]    Dermatology annually for cancer screening.    Recommend an eye exam every 1-2 years.     CT chest abdomen pelvis- #[phone]    Take sildenafil, 2-5 tablets once daily as needed.     Do not take nitroglycerin within 12 hours of taking Viagra.    GoodRx for medications:     1) Go to [email or web address]  2) Type in the name of the medication you were prescribed and click \"FIND THE LOWEST PRICE\" button (blue).  3) Specify dosage and quantity.  4) Look through the list of pharmacies offered and click on the \"GET FREE DISCOUNT/COUPON\" button (green) to the right of the pharmacy name.   5) Print the coupon and present to your pharmacist when you  your medication.    Mediterranean diet: Less red meats, processed foods, breads, and potatoes. Use olive oil instead of butter. Eat lots of fruits and vegetables. Focus on lean meats such as chicken and fish, and nuts for sources of protein.      Orders Placed This Encounter   Procedures     CT Chest Abdomen Pelvis With Oral With IV Cont     Standing Status:   Future     Standing Expiration Date:   3/23/2018 "     Order Specific Question:   Can the procedure be changed per Radiologist protocol?     Answer:   Yes     Order Specific Question:   If this is a diagnostic procedure, have the patient's age and recent imaging history been considered?     Answer:   Yes     Comprehensive Metabolic Panel     HM2(CBC w/o Differential)     Lipid Cascade     Order Specific Question:   Fasting is required?     Answer:   No     PSA (Prostatic-Specific Antigen), Annual Screen     Urinalysis     There are no discontinued medications.    Return in about 6 months (around 9/23/2017) for heart disease.    ASSESSED PROBLEMS:  Problem List Items Addressed This Visit     Coronary Artery Disease    Relevant Orders    Comprehensive Metabolic Panel    HM2(CBC w/o Differential)    Lipid [place]    Kidney Cancer    Relevant Orders    CT Chest Abdomen Pelvis With Oral With IV Cont    Comprehensive Metabolic Panel    HM2(CBC w/o Differential)    Urinalysis      Other Visit Diagnoses     Routine general medical examination at a health care facility    -  Primary    Special screening for malignant neoplasm of prostate        Relevant Orders    PSA (Prostatic-Specific Antigen), Annual Screen    Cholesteatoma, right              CHIEF COMPLAINT:  Chief Complaint   Patient presents with     Annual Exam       HISTORY OF PRESENT ILLNESS:  Jose Lynn is a 61 y.o. male presenting to the clinic today for an annual physical exam.    Erectile Dysfunction: He mentions that he would like to try a medication that allows him to be more spontaneous. He does not always like to have to take a tablet of Viagra and then wait an hour; it feels so scheduled to him. He has otherwise been tolerating the medication well.     Health maintenance: He states he had a colonoscopy completed around 12 years ago and is due at this time.  He did not receive the influenza vaccination this season, otherwise all of his immunizations are up to date at this time. He is taking vitamin D3  "daily.    REVIEW OF SYSTEMS:   He denies any chest pain, tightness or pressure in the chest, shortness of breath. He had a stent placed 5-6 years ago. He is currently taking 80 mg of atorvastatin once daily at bedtime. He has never needed to use the nitroglycerin. He takes MiraLAX once daily to help keep his bowel movements regular. He is wondering if he should be watching his diet more.   See completed form B. Comprehensive review of systems negative except as noted above.    PFSH:  History reviewed. No pertinent past medical history.  Past Surgical History:   Procedure Laterality Date     APPENDECTOMY       FOOT FRACTURE SURGERY Right 02/15/2010     KNEE ARTHROSCOPY W/ MENISCECTOMY Left 07/01/2016     NEPHRECTOMY Left      Family History   Problem Relation Age of Onset     Hypertension Father      Heart attack Father      No Medical Problems Mother      No Medical Problems Brother      Social History     Social History     Marital status:      Spouse name: N/A     Number of children: N/A     Years of education: N/A     Occupational History     Not on file.     Social History Main Topics     Smoking status: Never Smoker     Smokeless tobacco: Not on file     Alcohol use No     Drug use: No     Sexual activity: Yes     Partners: Female     Other Topics Concern     Not on file     Social History Narrative   Social: He is currently working for the [employer]. He rides an indoor exercise bike in the winter and has a fat tire bike as well.     VITALS:  Vitals:    03/23/17 0755   BP: 114/62   Pulse: 72   Weight: 186 lb 14.4 oz (84.8 kg)   Height: 5' 10\" (1.778 m)     Wt Readings from Last 3 Encounters:   03/23/17 186 lb 14.4 oz (84.8 kg)   12/15/16 187 lb (84.8 kg)   12/10/15 197 lb 11.2 oz (89.7 kg)     Body mass index is 26.82 kg/(m^2).  The following high BMI interventions were performed this visit: encouragement to exercise, dietary management education, guidance, and counseling and lifestyle education " regarding diet    PHYSICAL EXAM:  General Appearance: Alert, cooperative, no distress, appears stated age.  HEENT: EMOI, fundi not observed, TMs normal, mouth and throat without lesions. 2 mm raised ridge mid right ear canal with a little vascularity.   Neck: Supple without adenopathy or thyromegaly.  Back: No CVA tenderness or spinous process pain.  Lungs: Clear to auscultation bilaterally, good air movement.  Heart: Regular rate and rhythm, S1 and S2 normal, no murmur or bruit. Heart rate relatively slow.   Abdomen: Soft, non-tender, no HSM or masses.  Genitourinary: Normal male, testes descended without masses or hernias.  Rectal exam:  Really small size for age without nodules. Flat.   Musculoskeletal: No gross abnormalities.  Extremities: No CCE, pulses II/IV and symmetric. Superficial and somewhat larger varicose veins, more on right than left leg.   Skin: No worrisome lesions noted.  Lymph nodes: Cervical, supraclavicular, groin, and axillary nodes normal.  Neurologic: CNII-XII intact, strength V/V and symmetric, DTRs II/IV and symmetric, sensory grossly intact  Psychiatric:  He has a normal mood and affect.     ADDITIONAL HISTORY SUMMARIZED (FROM OLD RECORDS OR HISTORY FROM SOMEONE OTHER THAN THE PATIENT OR ANOTHER HEALTHCARE PROVIDER) (2 TOTAL): None.    DECISION TO OBTAIN EXTRA INFORMATION (OLD RECORDS REQUESTED OR HISTORY FROM ANOTHER PERSON OR ACCESSING CARE EVERYWHERE) (1 TOTAL): None.     RADIOLOGY TESTS SUMMARIZED OR ORDERED (XRAY/CT/MRI/DXA) (1 TOTAL): Ordered chest/abdomen/pelvis CT scan today.     LABS REVIEWED OR ORDERED (1 TOTAL): Ordered CMP, HM2, lipid, UA, and PSA labs today.     MEDICINE TESTS SUMMARIZED OR ORDERED (EKG/ECHO/COLONOSCOPY/EGD) (1 TOTAL): None.     INDEPENDENT REVIEW OF EKG OR X-RAY (2 EACH): None.     The visit lasted a total of 20 minutes face to face with the patient. Over 50% of the time was spent counseling and educating the patient about health maintenance.    Anyi LIZARRAGA  Alexander, am scribing for and in the presence of, Dr. Tamayo.    I, Dr. Tamayo, personally performed the services described in this documentation, as scribed by Anyi Munoz in my presence, and it is both accurate and complete.    MEDICATIONS:  Current Outpatient Prescriptions   Medication Sig Dispense Refill     aspirin 81 MG EC tablet Take 162 mg by mouth daily.       atorvastatin (LIPITOR) 80 MG tablet Take 1 tablet (80 mg total) by mouth bedtime. 90 tablet 2     cholecalciferol, vitamin D3, 1,000 unit tablet Take 1,000 Units by mouth daily.       lisinopril (PRINIVIL,ZESTRIL) 5 MG tablet TAKE ONE TABLET BY MOUTH ONE TIME DAILY 90 tablet 2     polyethylene glycol (MIRALAX) 17 gram packet Take 17 g by mouth daily.       sildenafil (VIAGRA) 100 MG tablet 1/4 to one tab daily as needed 10 tablet 12     nitroglycerin (NITROSTAT) 0.4 MG SL tablet Place 1 tablet (0.4 mg total) under the tongue every 5 (five) minutes as needed for chest pain. 60 tablet 0     No current facility-administered medications for this visit.        Total data points: 2

## 2021-06-10 NOTE — TELEPHONE ENCOUNTER
Spoke with him.   Recommended avoiding strenuous exercise until I can see him 8/10.  Can you send in script for him for SL NTG 0.4 mg 30 tabs.

## 2021-06-10 NOTE — PATIENT INSTRUCTIONS - HE
Cortisporin drops right ear 2-4 times a day for 5-10 days for the irritation and to prevent infection after the ear lavage. This will also soften remaining wax.   If ear pain is worse or no better in 3-5 days, start the oral antibiotic (amox-clav) which was printed.   Fluticasone nasal spray once each nostril twice a day for a week or two to help with the ear pressure.   Recheck as needed.

## 2021-06-10 NOTE — TELEPHONE ENCOUNTER
"Rec'd voicemail from patient with request for DOT certification letter fdrom Dr. 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    Please address patient's request for DOT letter - note patient was seen by NP 8-27-20 - f/u sched with you 10-22-20.  mg  "

## 2021-06-10 NOTE — TELEPHONE ENCOUNTER
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 LEAVING Friday 7- FOR A BIKING TRIP IN [place], SHOULD HE BE SEEN PRIOR?  PLEASE CALL    Caller: PATIENT  Primary cardiologist: DR CHONG  Detailed reason for call: SEE ABOVE  New or active symptoms? YES, SEE ABOVE  Best phone number: [phone]  Best time to contact: ANY TIME  Ok to leave a detailed message? YES  Device? NO    Additional Info:   FLUTTERING ONCE IN A WHILE

## 2021-06-10 NOTE — PROGRESS NOTES
Assessment/Plan:   Excessive cerumen in ear canal, right  Otitis externa of right ear  OME (otitis media with effusion), right  Right ear pain. There is an effusion, possibly purulent which is likely residual from recent OM and recent travel and mountain hiking, altitude changes. Wax in canal obscured the exam somewhat, partially removed with curette and lavage. Mild external ear canal redness, irritation and possibly swelling. We will institute flonase daily for a week or two, cortisporin drops for the ear canal irritation, TMJ precautions, and if worse or no better then start the augmentin.   I discussed red flag symptoms, return precautions to clinic/ER and follow up care with patient/parent.  Expected clinical course, symptomatic cares advised. Questions answered. Patient/parent amenable with plan.  - Ear wax removal  - neomycin-polymyxin-hydrocortisone (CORTISPORIN) otic solution; Administer 5 drops to the right ear 3 (three) times a day for 10 days.  Dispense: 10 mL; Refill: 0  - amoxicillin-clavulanate (AUGMENTIN) 875-125 mg per tablet; Take 1 tablet by mouth 2 (two) times a day for 10 days.  Dispense: 20 tablet; Refill: 0  - fluticasone propionate (FLONASE) 50 mcg/actuation nasal spray; 1 spray each nostril once or twice daily.  Dispense: 18 g; Refill: 2    Cortisporin drops right ear 2-4 times a day for 5-10 days for the irritation and to prevent infection after the ear lavage. This will also soften remaining wax.   If ear pain is worse or no better in 3-5 days, start the oral antibiotic (amox-clav) which was printed.   Fluticasone nasal spray once each nostril twice a day for a week or two to help with the ear pressure.   Recheck as needed.    Patient wore mask throughout the visit  Provider and MA staff wore mask throughout visit.     Subjective:      Jose Lynn is a 64 y.o. male who presents for evaluation of right ear pain. This has been bothering him for a few days though the worst was last night,  a lot of pressure and pain which is not as bothersome today. He is scheduled for an angiogram tomorrow and his recent covid test was negative.   He was diagnosed with an ear infection in July and treated with amoxicillin (500mg three times a day) which seemed to clear it up. He then traveled to [place] and was up and down the gondolas hiking with grandsons in the mountains and felt a lot of pressure and popping in the ear. Had been chewing more gum to help the ears adjust to the altitude changes. Hearing has been a bit diminished on that side. Now there has been more of the pain similar to prior ear infection.   No fever or chills, no URI or nasal congestion. No ST or cough, CP or shortness of breath. No rash. No vertigo or N/V/D.   Non-smoker.     No Known Allergies    Current Outpatient Medications on File Prior to Visit   Medication Sig Dispense Refill     aspirin 81 MG EC tablet Take 162 mg by mouth daily.        atorvastatin (LIPITOR) 80 MG tablet TAKE 1 TABLET BY MOUTH EVERYDAY AT BEDTIME 90 tablet 1     cholecalciferol, vitamin D3, 1,000 unit tablet Take 1,000 Units by mouth daily.       multivitamin therapeutic tablet Take 1 tablet by mouth daily.       nitroglycerin (NITROSTAT) 0.4 MG SL tablet Place 1 tablet (0.4 mg total) under the tongue every 5 (five) minutes as needed for chest pain. 1 Bottle 2     omega-3/dha/epa/fish oil (FISH OIL-OMEGA-3 FATTY ACIDS) 300-1,000 mg capsule Take 2 g by mouth daily.       polyethylene glycol (MIRALAX) 17 gram packet Take 17 g by mouth daily.       sildenafil (REVATIO) 20 mg tablet Take 4 tablets (equals 80 mg) 1 hour prior to activity, maximum 1 dose in 24 hours 40 tablet 1     No current facility-administered medications on file prior to visit.      Patient Active Problem List   Diagnosis     Dyslipidemia, goal LDL below 70     Coronary Artery Disease     Kidney Cancer     Basal Cell Carcinoma Of The Skin     Abnormal stress echocardiogram     Coronary artery disease  involving native coronary artery of native heart with other form of angina pectoris (H)     Palpitations     History of coronary artery disease     Dyslipidemia     Paroxysmal atrial fibrillation (H)     Other forms of angina pectoris (H)     Coronary artery disease involving native coronary artery without angina pectoris, unspecified whether native or transplanted heart       Objective:     /87 (Patient Site: Right Arm, Patient Position: Sitting, Cuff Size: Adult Regular)   Pulse (!) 50   Temp 98.3  F (36.8  C) (Oral)   Resp 18   Wt 185 lb 8 oz (84.1 kg)   SpO2 98%   BMI 26.81 kg/m      Physical  General Appearance: Alert, cooperative, no distress, AVSS  Head: Normocephalic, without obvious abnormality, atraumatic  Eyes: Conjunctivae are normal.   Ears: Normal TM and external ear canal on the left side. The right ear is not painful with retraction of the pinna, but mildly so with pressure on the tragus. The ear canal and TM are partially obscured with cerumen, canal is a bit narrow with a bend in it. Cerumen was partially removed with ear lavage by the nursing staff allowing visualization of the TM. No redness but there is an effusion, yellowish, possibly purulent, causing loss of landmarks and light reflex.   Mild right TMJ pain with palpation and jaw opening.   Nose: No significant congestion.  Skin: no rashes or lesions around the ear  Psychiatric: Patient has a normal mood and affect.

## 2021-06-10 NOTE — TELEPHONE ENCOUNTER
Left voicemail for patient to return call to clinic. When patient returns call, please give them below message.    Urvashi Balderas CMA ............... 1:28 PM, 07/28/20

## 2021-06-10 NOTE — TELEPHONE ENCOUNTER
It appears that the patient requested a refill for prn nitroglycerin SL tab's. Okay for that order?

## 2021-06-10 NOTE — TELEPHONE ENCOUNTER
I am unable to prescribe nitroglycerin as he has sildenafil/Revatio on his medication list for erectile dysfunction.  Sildenafil and nitroglycerin can interact and cause severe low blood pressure and even death.    Please have patient follow-up in clinic with either available provider here or his cardiologist in the next few weeks to discuss his chest pain complaints.  If he has chest pain for more than 10 minutes despite rest, he should go to the ER for evaluation.

## 2021-06-10 NOTE — PROGRESS NOTES
Jose Lynn  [address]  [place] [address]  [phone] (home)     Primary cardiologist:  Dr. Johnson  PCP:  Aki Bolaños MD  Procedure: Coronary Angiogram With Possible Percutaneous Coronary Intervention  H&P completed by:  Dr. Johnson 8/10/20  Case MD:  Dr. Neville or Dr. Monroy  Admit date and time:  8/13/20 @ 7:30  Case start time:  TBD  Ordering MD:  Dr. Johnson  Diagnosis:Other forms of angina pectoris (H) [I20.8]   Coronary artery disease involving native coronary artery without angina pectoris, unspecified whether native or transplanted heart [I25.10]   Anticoagulation: None  CPAP: No  Bypass Grafts: No  Renal Issues: No  Allergies: NKA  Diabetic?: No  Device?: No      Angiogram Teaching    Reason for Visit:  Patient seen for pre-procedure education in preparation for:  Coronary Angiogram With Possible Percutaneous Coronary Intervention    Procedure Prep:  Cardiologist note dated: 8/10/20  EKG results obtained, dated: on admission  Pertinent test results obtained - Viewable in Epic, dated: Patient Archive CV lab 3/2/10, 12/19/12, 6/22/18  Hemogram results obtained: on admission  Basic Metabolic Panel results obtained: on admission  Lipid Profile results obtained: on admission    Pre-procedure instructions  Patient instructed to be NPO after midnight.  Patient instructed to arrange for transportation home following procedure.  Patient instructed to have a responsible adult with them for 24 hours post-procedure.  Post-procedure follow up process.  Conscious sedation discussed.  The patient was sent the pre-procedure letter on 8/10/20    Pre-procedure medication instructions  Patient instructed on antiplatelet medication.  Continue medications as scheduled, with a small amount of water on the day of the procedure unless indicated.  Patient instructed to take 325 mg of Aspirin am of procedure: Yes  Other medication: instructed to hold sildenafil for 5 days prior,multivitamins and minerals a.m. of  the procedure.    *PATIENTS RECORDS AVAILABLE IN [facility] UNLESS OTHERWISE INDICATED*    *Order set was entered on this date: 8/10/20      Patient Active Problem List   Diagnosis     Dyslipidemia, goal LDL below 70     Coronary Artery Disease     Kidney Cancer     Basal Cell Carcinoma Of The Skin     Abnormal stress echocardiogram     Coronary artery disease involving native coronary artery of native heart with other form of angina pectoris (H)     Palpitations     History of coronary artery disease     Dyslipidemia     Paroxysmal atrial fibrillation (H)     Other forms of angina pectoris (H)     Coronary artery disease involving native coronary artery without angina pectoris, unspecified whether native or transplanted heart       Current Outpatient Medications   Medication Sig Dispense Refill     aspirin 81 MG EC tablet Take 162 mg by mouth daily.        atorvastatin (LIPITOR) 80 MG tablet TAKE 1 TABLET BY MOUTH EVERYDAY AT BEDTIME 90 tablet 1     cholecalciferol, vitamin D3, 1,000 unit tablet Take 1,000 Units by mouth daily.       multivitamin therapeutic tablet Take 1 tablet by mouth daily.       nitroglycerin (NITROSTAT) 0.4 MG SL tablet Place 1 tablet (0.4 mg total) under the tongue every 5 (five) minutes as needed for chest pain. 1 Bottle 2     omega-3/dha/epa/fish oil (FISH OIL-OMEGA-3 FATTY ACIDS) 300-1,000 mg capsule Take 2 g by mouth daily.       polyethylene glycol (MIRALAX) 17 gram packet Take 17 g by mouth daily.       sildenafil (REVATIO) 20 mg tablet Take 4 tablets (equals 80 mg) 1 hour prior to activity, maximum 1 dose in 24 hours 40 tablet 1     No current facility-administered medications for this visit.        No Known Allergies    Plan  Patient's wife, Morena, will be the  the day of the procedure and will stay with him for 24 hours after.  COVID test 9:45 8/10/20.  Patient ready for procedure    KATHIA Jamison       Instructions for Patients Scheduled for Cardiac Procedures During the    COVID-19 Pandemic      - Your Provider has determined that your condition warrants going ahead with your procedure at this time.  - You will need to be tested for COVID-19 48-72 hours (2-3 days) prior to your procedure.  - We highly encourage you to get tested for COVID-19 at one of our designated [facility] testing sites. We process the tests within our lab, which allows us to get the results back quickly.   - If you choose to get tested at a non-[facility] location, you will need to contact your primary care provider to make those arrangements to ensure the results are available to your cardiologist before you arrive for your procedure. If we DO NOT receive the results in time, your procedure may be postponed or canceled. The results will need to get faxed to [phone].  - After testing, you will need to remain in self-quarantine until your procedure.  - If you are notified of a positive COVID-19 result; you will need to call your provider at [phone] for further recommendations.    - If the test is positive; PLEASE DO NOT PRESENT FOR YOUR PROCEDURE until you have been given further instructions.   - You will not be called with negative results, so arrive as instructed unless otherwise notified.    Don't:    Don't invite visitors or friends into your home.    Don't leave your home unless absolutely necessary.    Don't share utensils and other household items with others in the home.  Do:    Wash your hands regularly with soap and water and use hand  with at least 60% alcohol if you don't have easy access to soap and water.     Disinfect surface areas daily, including doorknobs, electronics - especially phones, laptops and other devices.     Wash utensils and other items thoroughly.     Separate yourself from others in the household as best you can, including pets.    Keep your hands away from your face.     Practice all other prevention tips the CDC recommends, including covering  your coughs and sneezes with a tissue or your sleeve and immediately throwing the tissue into the trash and washing your hands.    Call the clinic if you develop any of the following symptoms before your procedure:    Fever     Cough    Shortness of breath    Sore throat    Runny or stuffy nose    Muscle or body aches    Headaches    Fatigue    Vomiting and diarrhea    These steps will help keep you and your family, other patients and hospital staff safe from COVID-19

## 2021-06-10 NOTE — TELEPHONE ENCOUNTER
Rec'd patient's call back via transfer from  after patient arranged 8-10-20 appt with Dr. Johnson - instructed patient to also contact PCP for urgent evaluation of sx prior to travel and reassured him that update would be forwarded to Dr. Johnson for any new recommendations - informed patient that he should also discuss renewal of prn Nitrostat SL w/ PCP - understanding verbalized.    Please review patient update - any new orders prior to 8-10-20 f/u appt.  mg

## 2021-06-10 NOTE — TELEPHONE ENCOUNTER
Response noted - new Rx sent to listed preferred pharmacy as directed - Rx flagged for contraindications with prn Rx for Sildenafil.    Left detailed message for patient informing him that Nitrostat SL Rx submitted to [facility] and that new Rx should not be taken within 24hrs of Sildenafil due to risk of severe hypotension - requested call back with any questions/concerns.    mg

## 2021-06-10 NOTE — TELEPHONE ENCOUNTER
Wellness Screening Tool  Symptom Screening:  Do you have one of the following NEW symptoms:    Fever (subjective or >100.0)?  No    A new cough?  No    Shortness of breath?  No     Chills? No     New loss of taste or smell? No     Generalized body aches? No     New persistent headache? No     New sore throat? No     Nausea, vomiting, or diarrhea?  No    Within the past 3 weeks, have you been exposed to someone with a known positive illness below:    COVID-19 (known or suspected)?  No    Chicken pox?  No    Mealses?  No    Pertussis?  No    Patient notified of visitor policy- They may have one person accompany them to their appointment, but they will need to wear a mask and will be screened upon arrival for symptoms: Yes  Pt informed to wear a mask: Yes  Pt notified if they develop any symptoms listed above, prior to their appointment, they are to call the clinic directly at [phone] for further instructions.  Yes  Patient's appointment status: Patient will be seen in clinic as scheduled on 8/27

## 2021-06-10 NOTE — TELEPHONE ENCOUNTER
Noted patient was last seen 4-18-19 - 1yr f/u recommended.     Return call to patient who stated he has been experiencing increased shortness of breath while biking and is uncertain if it is related to his heart or just out of shape - patient has been biking more hills in his new neighborhood.  Patient's trip to [place] is for a family reunion not for biking  - patient has not discussed his sx with PCP.    Informed patient that he should be evaluated in [facility] since he is overdue for f/u with Dr. 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  mg

## 2021-06-10 NOTE — PATIENT INSTRUCTIONS - HE
Jose Lynn,    It was a pleasure to see you today at the [facility].     My recommendations after this visit include:  - No changes to your medications.    - Schedule follow up with Dr. Johnson in October    Irina Guerrero CNP      Medication     o Take all your medications as prescribed  o Do not stop any medications without talking with a healthcare provider    Exercise      o Physical activity is important for overall health  o Set a goal of 150 minutes of exercise each week  o For example, 30 minutes of exercise 5 days each week.    o These 30 minutes can be broken into shorter periods of 15 minutes twice daily or 10 minutes three times daily  o Start any exercise program slowly and work towards the goal of 150 minutes each week  o For example, you may start with 10 minutes and plan to add a few minutes each week as you get stronger   o Examples of exercise include walking, swimming, or biking  o Remember to stretch and stay hydrated with exercise    Diet     o A heart healthy diet includes:  o A variety of fruits and vegetables  o Whole grains  o Low-fat dairy (fat-free, 1% fat, and low-fat)  o Lean meats and poultry without skin   o Fish (eat fish 2 times each week)  o Nuts  o Limit saturated fat to about 13 grams each day (based on a 2000 calorie diet)  o Limit red meat  o Limit sugars (sweets and sugary beverages)  o Limit your portion sizes  o Do not add salt to your food when cooking or at the table  o Limit alcohol intake (no more than 1 drink each day for women or 2 drinks each day for men)    Weight Loss     o Work on losing weight with diet and exercise  o You BMI (body mass index) should be between 18.5-24.9  o This is a calculation of your weight and height  o Please ask your healthcare provider for your BMI    Manage Other Chronic Health Conditions     o Control cholesterol  o Eat a diet low in saturated fat  o Exercise   o Take a statin medication as prescribed  o Manage  blood pressure  o Eat a diet low in sodium  o Exercise  o Reduce stress  o Lose weight   o Take blood pressure medications as prescribed  o Control blood sugars if diabetic  o Monitor sugars and carbohydrates in your diet  o Lose weight   o Take diabetes medications as prescribed  o Follow-up with your primary care provider to make sure your blood sugars are well controlled    Stress Reduction     o Find time each day to relax  o Reading, listening to music, yoga, meditation, exercise, spending time with friends and family, volunteering   o Get 6-8 hours of sleep each night    Smoking Cessation     o Smoking causes numerous health problems including coronary artery disease  o It is never too late to quit  o Set realistic goals for quitting  o Decrease the number of cigarettes used each week  o Use nicotine gum or patches to help you quit    Information from the American Heart Association.  Please visit their website at www.heart.org

## 2021-06-11 NOTE — TELEPHONE ENCOUNTER
Jing - can you send this in letter format?        8/31/2020    Mr. Jose Lynn is a patient of [facility] at [facility].  He recently underwent stent placement on 8/13/2020.  The procedure went well without complications.  He has not had any further anginal discomfort since his procedure.  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    Janette Johnson MD

## 2021-06-11 NOTE — TELEPHONE ENCOUNTER
Dr. Johnson's response copied to letter template.    Phone call to patient with update and informed him that [facility] was [facility] - patient verbalized understanding and agreed to call back if he is unable to print letter from home.  mg

## 2021-06-13 NOTE — TELEPHONE ENCOUNTER
Refill Approved    Rx renewed per Medication Renewal Policy. Medication was last renewed on 7/9/20.    Vonda Vasquez, [facility] Triage/Med Refill 11/16/2020     Requested Prescriptions   Pending Prescriptions Disp Refills     sildenafil (REVATIO) 20 mg tablet [Pharmacy Med Name: Sildenafil Citrate Oral Tablet 20 MG] 40 tablet 0     Sig: take 4 tablets by mouth 1 hour prior to activity. max 1 dose every 24 hours       Medications for Impotence Refill Protocol Passed - 11/14/2020  9:28 AM        Passed - PCP or prescribing provider visit in last year     Last office visit with prescriber/PCP: 7/9/2020 Daniel Alex MD OR same dept: 7/9/2020 Daniel Alex MD OR same specialty: 7/9/2020 Daniel Alex MD  Last physical: Visit date not found Last MTM visit: Visit date not found   Next visit within 3 mo: Visit date not found  Next physical within 3 mo: Visit date not found  Prescriber OR PCP: Daniel Alex MD  Last diagnosis associated with med order: 1. Need for pneumococcal vaccination  - sildenafil (REVATIO) 20 mg tablet [Pharmacy Med Name: Sildenafil Citrate Oral Tablet 20 MG]; take 4 tablets by mouth 1 hour prior to activity. max 1 dose every 24 hours   Dispense: 40 tablet; Refill: 0    2. Erectile dysfunction, unspecified erectile dysfunction type  - sildenafil (REVATIO) 20 mg tablet [Pharmacy Med Name: Sildenafil Citrate Oral Tablet 20 MG]; take 4 tablets by mouth 1 hour prior to activity. max 1 dose every 24 hours   Dispense: 40 tablet; Refill: 0    If protocol passes may refill for 12 months if within 3 months of last provider visit (or a total of 15 months).

## 2021-06-15 NOTE — TELEPHONE ENCOUNTER
----- Message from Katerin Loja sent at 2/12/2021 11:53 AM CST -----  Regarding: Johnson- Med question  Please call patient in regards to Rx for clopidogreL (PLAVIX) 75 mg tablet . Wife is questioning the Rx due to patients health.    [phone]

## 2021-06-15 NOTE — TELEPHONE ENCOUNTER
Dr. Johnson,  Patient is asking if it is ok to just stop Brilinta and start clopidogrel or does he need to taper off the Brilinta.  Any recommendations?  Thank you,  Rosie  ----------------------------------------  Patient did  the rx for clopidogrel and started it today.  The pharmacist suggested the Brilinta should be tapered off.  Patient was instructed to stop it after coronary angiogram 2/11/21. Informed patient a message will be sen tot Dr. Johnson and patient will be contacted if any different recommendations are given. Patient verbalized understanding and is agreeable to plan.

## 2021-06-15 NOTE — TELEPHONE ENCOUNTER
brilinta does not need to be tapered.   Could have him take 300 mg plavix dose for his first dose after stopping brilinta then take 75 mg daily for one more year.

## 2021-06-16 PROBLEM — K57.30 DIVERTICULAR DISEASE OF LARGE INTESTINE: Status: ACTIVE | Noted: 2019-07-19

## 2021-06-16 PROBLEM — K25.9 GASTRIC ULCER WITHOUT HEMORRHAGE OR PERFORATION: Status: ACTIVE | Noted: 2019-07-19

## 2021-06-16 PROBLEM — K44.9 DIAPHRAGMATIC HERNIA: Status: ACTIVE | Noted: 2019-07-19

## 2021-06-16 PROBLEM — K31.9 DISORDER OF FUNCTION OF STOMACH: Status: ACTIVE | Noted: 2019-07-23

## 2021-06-16 PROBLEM — K63.5 POLYP OF COLON: Status: ACTIVE | Noted: 2019-07-19

## 2021-06-16 PROBLEM — D12.2 BENIGN NEOPLASM OF ASCENDING COLON: Status: ACTIVE | Noted: 2019-07-23

## 2021-06-16 PROBLEM — R00.2 PALPITATIONS: Status: ACTIVE | Noted: 2018-11-25

## 2021-06-16 PROBLEM — R94.39 ABNORMAL STRESS TEST: Status: ACTIVE | Noted: 2021-02-05

## 2021-06-16 PROBLEM — I48.0 PAROXYSMAL ATRIAL FIBRILLATION (H): Status: ACTIVE | Noted: 2018-11-25

## 2021-06-17 NOTE — PATIENT INSTRUCTIONS - HE
Patient Instructions by Niranjan Laird MD at 3/20/2019  6:20 PM     Author: Niranjan Laird MD Service: -- Author Type: Resident    Filed: 3/20/2019  7:53 PM Encounter Date: 3/20/2019 Status: Addendum    : Niranjan Laird MD (Resident)    Related Notes: Original Note by Niranjan Laird MD (Resident) filed at 3/20/2019  7:25 PM       Patient Education     Rib Contusion or Minor Fracture    A rib contusion is a bruise to one or more rib bones. It may cause pain, tenderness, swelling, and a purplish tint to the skin. There may be a sharp pain with each breath. A rib contusion takes anywhere from a few days to a few weeks to heal. A minor rib fracture or break may cause the same symptoms as a rib contusion. The small crack may not be seen on a regular chest X-ray. Treatment for both problems is the same.  Home care    You may use over-the-counter pain medicine to control pain, unless another pain medicine was prescribed. If you have chronic liver or kidney disease or ever had a stomach ulcer or GI bleeding, talk with your healthcare provider before using these medicines.    Rest. Do not lift anything heavy or do any activity that causes pain.    Apply an ice pack over the injured area for 15 to 20 minutes every 1 to 2 hours. You should do this for the first 24 to 48 hours. You can make an ice pack by filling a plastic bag that seals at the top with ice cubes and then wrapping it with a thin towel. Continue with ice packs as needed for the relief of pain and swelling.    The first 3 to 4 weeks of healing will be the most painful. If your pain is not under control with the treatment given, call your healthcare provider. Sometimes a stronger pain medicine may be needed. A nerve block can be done in case of severe pain. It will numb the nerve between the ribs.  Follow-up care  Follow up with your healthcare provider, or as advised.  If X-rays were taken, you will be told of any new findings that may affect  your care.  Call 911  Call 911 if you have:    Dizziness, weakness or fainting    Shortness of breath with or without chest discomfort    New or worsening pain  When to seek medical advice  Call your healthcare provider right away if any of these occur:    Fever of 100.4 F (38 C) or higher, or as directed by your healthcare provider    Stomach pain  Date Last Reviewed: 12/2/2015 2000-2017 [employer]. [address] [place] [address], [place] [address]. All rights reserved. This information is not intended as a substitute for professional medical care. Always follow your healthcare professional's instructions.         Patient Education     Rib Fracture    You broke one or more ribs. This is called a rib fracture. Rib fractures do not require a cast like other bones. They will heal by themselves in about 4 to 6 weeks. The first 3 to 4 weeks will be the most painful because deep breathing, coughing, or changing position from sitting to lying down, may cause the broken ends to move slightly.  Home care    Rest. You should not be doing any heavy lifting or strenuous exertion until the pain goes away.    It hurts to breathe when you have a broken rib. This puts you at risk of getting pneumonia from poor airflow through your lungs. To prevent this:  ? Take several very deep breaths once an hour while you're awake. Exhale through pursed lips as if you are blowing up a balloon. If possible, actually blow up a balloon or a rubber glove. This exercise builds up pressure inside the lung and prevents collapse of the small air sacs of the lung. This exercise may cause some pain at the site of injury, which is normal.  ? You may have gotten a breathing exercise device called an incentive spirometer. Use it at least 4 times a day, or as directed.    Apply an ice pack over the injured area for 15 to 20 minutes every 1 to 2 hours. You should do this for the first 24 to 48 hours. You can make an ice pack by filling a plastic  bag that seals at the top with ice cubes and then wrapping it with a thin towel. Continue with ice packs as needed for the relief of pain and swelling.    You may use over-the-counter pain medicine to control pain, unless another pain medicine was prescribed. If you have chronic liver or kidney disease or ever had a stomach ulcer or GI bleeding, talk with your healthcare provider before using these medicines.    If your pain is not controlled, contact your healthcare provider. Sometimes a stronger pain medicine may be needed. A nerve block can be done in case of severe pain. It will numb the nerve between the ribs.  Follow-up care  Follow up with your healthcare provider, or as advised. Rarely, a broken rib will cause complications within the first few days that may not be evident during your initial exam. This can include collapsed lung, bleeding around the lung or into the abdomen, or pneumonia. Therefore, watch for the signs below.  If X-rays were taken, you will be told of any new findings that may affect your care.  Call 911  Call 911 if you have:    Dizziness, weakness or fainting    Shortness of breath with or without chest discomfort    New or worsening abdominal pain    Discomfort in other areas of your upper body such as your shoulders, jaw, neck, or arms  When to seek medical advice  Call your healthcare provider right away if any of these occur:    Increasing chest pain with breathing    Fever of 100.4 F (38 C) or higher, or as directed by your healthcare provider    Congested cough  Date Last Reviewed: 12/3/2015    5411-8892 [employer]. [address] [place] [address], [place] [id number]. All rights reserved. This information is not intended as a substitute for professional medical care. Always follow your healthcare professional's instructions.

## 2021-06-17 NOTE — PATIENT INSTRUCTIONS - HE
Patient Instructions by Laverne Jiang MD at 12/8/2019 10:20 AM     Author: Laverne Jiang MD Service: -- Author Type: Physician    Filed: 12/8/2019 11:03 AM Encounter Date: 12/8/2019 Status: Addendum    : Laverne Jiang MD (Physician)    Related Notes: Original Note by Laverne Jiang MD (Physician) filed at 12/8/2019 11:02 AM       1. Keep well hydrated  2. May alternate Tylenol every 6 hours with ibuprofen every 6 hours as needed for fever or pain  3. If follow up is needed, try and be seen at your primary clinic. Or you can be seen by any primary care provider at one of our other [facility] sites  4. If you have any questions, call the clinic number  - it's answered 24/7    Patient Education     Acute Bacterial Rhinosinusitis (ABRS)    Acute bacterial rhinosinusitis (ABRS) is an infection of your nasal cavity and sinuses. Its caused by bacteria. Acute means that youve had symptoms for less than 4 weeks, but possibly up to 12 weeks.  Understanding your sinuses  The nasal cavity is the large air-filled space behind your nose. The sinuses are a group of spaces formed by the bones of your face. They connect with your nasal cavity. ABRS causes the tissue lining these spaces to become inflamed. Mucus may not drain normally. This leads to facial pain and other symptoms.  What causes ABRS?  ABRS most often follows an upper respiratory infection caused by a virus. Bacteria then infect the lining of your nasal cavity and sinuses. But you can also get ABRS if you have:    Nasal allergies    Long-term nasal swelling and congestion not caused by allergies    Blockage in the nose  Symptoms of ABRS  The symptoms of ABRS may be different for each person and include:    Nasal congestion or blockage    Pain or pressure in the face    Thick, colored drainage from the nose  Other symptoms may include:    Runny nose    Fluid draining from the nose down the throat (postnasal  drip)    Headache    Cough    Pain    Fever  Diagnosing ABRS  ABRS may be diagnosed if youve had an upper respiratory infection like a cold and cough for 10 or more days without improvement or with worsening symptoms. Your healthcare provider will ask about your symptoms and your medical history. The provider will check your vital signs, including your temperature. Youll have a physical exam. The healthcare provider will check your ears, nose, and throat. You likely wont need any tests. If ABRS comes back, you may have a culture or other tests.  Treatment for ABRS  Treatment may include:    Antibiotic medicine. This is for symptoms that last for at least 10 to 14 days.    Nasal corticosteroid medicine. Drops or spray used in the nose can lessen swelling and congestion.    Over-the-counter pain medicine. This is to lessen sinus pain and pressure.    Nasal decongestant medicine. Spray or drops may help to lessen congestion. Do not use them for more than a few days.    Salt wash (saline irrigation). This can help to loosen mucus.  Possible complications of ABRS  ABRS may come back or become long-term (chronic). In rare cases, ABRS may cause complications such as:     Inflamed tissue around the brain and spinal cord (meningitis)    Inflamed tissue around the eyes (orbital cellulitis)    Inflamed bones around the sinuses (osteitis)  These problems may need to be treated in a hospital with intravenous (IV) antibiotic medicine or surgery.  When to call the healthcare provider  Call your healthcare provider if you have any of the following:    Symptoms that dont get better, or get worse    Symptoms that dont get better after 3 to 5 days on antibiotics    Trouble seeing    Swelling around your eyes    Confusion or trouble staying awake   Date Last Reviewed: 5/1/2017 2000-2019 [employer]. [address] [place] [address], [place] [address]. All rights reserved. This information is not intended as a substitute for  professional medical care. Always follow your healthcare professional's instructions.       Patient Education     Viral Conjunctivitis    Viral conjunctivitis (sometimes called pink eye) is a common infection of the eye. It is very contagious. Touching the infected eye, then touching another person passes this infection. It can also be spread from one eye to the other in this same way. The most common symptoms include redness, discharge from the eye, swollen eyelids, and a gritty or scratchy feeling in the eye.  This condition will take about 7 to 10 days to go away. Artificial tears (available without a prescription) are often recommended to moisten and clean the eyes. Antibiotic eye drops often are not recommended because they will not kill the virus. But sometimes they may be prescribed by eye doctors. This is to prevent a second, bacterial infection.  Home care    Apply a towel soaked in cool water to the affected eye 3 to 4 times a day (just before applying medicine to the eye).    It is common to have mucus drainage during the night, causing the eyelids to become crusted by morning. Use a warm, wet cloth to wipe this away.    Wash any cloths used to clean the eye after one use. Don't reuse them.    If antibiotic medicines are prescribed, take them exactly as directed. Don't stop taking them until you are told to.    You may use acetaminophen or ibuprofen to control pain, unless another medicine was prescribed. (Note: If you have chronic liver or kidney disease, or if you have ever had a stomach ulcer or gastrointestinal bleeding, talk with your healthcare provider before using these medicines.) Aspirin should never be used in anyone under 18 years of age who is ill with a fever. It may cause severe liver damage.    Wash your hands before and after touching the affected eye. This helps to prevent spreading the infection to your other eye and to others.    The infected person should avoid sharing towels,  washcloths, and bedding with others. This is to prevent spreading the infection.    This illness is contagious during the first week. Children with this illness should be kept out of day care and school until the redness clears.  Follow-up care  Follow up with your healthcare provider, or as advised.  When to seek medical advice  Call your healthcare provider right away if any of these occur:    Worsening vision    Increasing pain in the eye    Increasing swelling or redness of the eyelid    Redness spreading to the face around the eye    Large amount of green or yellow drainage from the eye    Severe itching in or around the eye    Fever of 100.4 F (38 C) or higher  Date Last Reviewed: 7/1/2017 2000-2017 [employer]. [address] [place] [address], [place] [address]. All rights reserved. This information is not intended as a substitute for professional medical care. Always follow your healthcare professional's instructions.

## 2021-06-17 NOTE — PROGRESS NOTES
ASSESSMENT:  1. Routine general medical examination at a health care facility  All up to date.      2. Malignant neoplasm of kidney excluding renal pelvis  Years ago and with persistent mild renal insufficiency and anemia relating to how little kidney he has.  I will check iron levels today however.  - Comprehensive Metabolic Panel  - HM2(CBC w/o Differential)    3. Coronary atherosclerosis  He had recurrent stenosis after being on appropriate treatment.  It has been several years since his stress test and therefore I feel appropriate to check a stress test now.  He is a difficult historian typically.  His blood pressures relatively low when he is on low-dose lisinopril.  For the combination of issues of his mild renal insufficiency, low blood pressure, and no evidence of any hypertension, in fact hypotension, will stop lisinopril at this time.  - Lipid Cascade  - Comprehensive Metabolic Panel  - Echo Stress Exercise; Future    4. Bradycardia  Check a TSH just to make sure that is not causing both bradycardia and some low blood pressure.  - Thyroid Stimulating Hormone (TSH)    5. Special screening for malignant neoplasm of prostate  Routine screening  - PSA (Prostatic-Specific Antigen), Annual Screen    6. Screening for metabolic disorder  Should have these checked  - Urinalysis  - Vitamin D, Total (25-Hydroxy)    7. Need for hepatitis C screening test  Needs to be done one time  - Hepatitis C Antibody (Anti-HCV)      PLAN:  Patient Instructions   Please note that if over the counter medications were taken off of your medication list, it is not because you are being asked to stop taking them.  does not want all of the over the counter medications on your medication list, as it bogs down the list.     Please call your insurance company and discuss Shingrix vaccine. This is a series of 2 injections that MUST be given 2-6 months apart and will cost around $287.00 here at [facility].    Continue with  the 162 mg of aspirin daily.     You do not need to see cardiology, but we will get a stress test.     It is fine to stop lisinopril, but you will need to monitor your blood pressure at home.     You should not be donating blood.       Orders Placed This Encounter   Procedures     Thyroid Stimulating Hormone (TSH)     Urinalysis     PSA (Prostatic-Specific Antigen), Annual Screen     Lipid Cascade     Order Specific Question:   Fasting is required?     Answer:   No     Comprehensive Metabolic Panel     HM2(CBC w/o Differential)     Vitamin D, Total (25-Hydroxy)     Hepatitis C Antibody (Anti-HCV)     Medications Discontinued During This Encounter   Medication Reason     cholecalciferol, vitamin D3, 1,000 unit tablet      nitroglycerin (NITROSTAT) 0.4 MG SL tablet Reorder       Return in about 1 year (around 4/16/2019) for Annual physical.    ASSESSED PROBLEMS:  Problem List Items Addressed This Visit     Coronary Artery Disease    Relevant Medications    nitroglycerin (NITROSTAT) 0.4 MG SL tablet    Other Relevant Orders    Lipid Cascade    Comprehensive Metabolic Panel    Echo Stress Exercise    Kidney Cancer    Relevant Orders    Comprehensive Metabolic Panel    HM2(CBC w/o Differential)      Other Visit Diagnoses     Routine general medical examination at a health care facility    -  Primary    Bradycardia        Relevant Orders    Thyroid Stimulating Hormone (TSH)    Special screening for malignant neoplasm of prostate        Relevant Orders    PSA (Prostatic-Specific Antigen), Annual Screen    Screening for metabolic disorder        Relevant Orders    Urinalysis    Vitamin D, Total (25-Hydroxy)    Need for hepatitis C screening test        Relevant Orders    Hepatitis C Antibody (Anti-HCV)          CHIEF COMPLAINT:  Chief Complaint   Patient presents with     Annual Exam       HISTORY OF PRESENT ILLNESS:  Jose Lynn is a 62 y.o. male presenting to the clinic today for an annual physical exam. He does not  have any acute concerns today.     Health Maintenance: He recalls having a colonoscopy, but we do not have it on file. He will have a PSA checked today. He will be screened for hepatitis C as well.     REVIEW OF SYSTEMS:   He has coronary artery disease with a history of stent placement. He has been taking two baby aspirin daily and 80 mg of atorvastatin daily. He bruises and bleeds easily. He follows up with cardiology every year or two and is supposed to be seen next week, but he inquires if he really needs to continue seeing them. He denies chest pain, pressure, or tightness in the chest. He bikes every morning and does not have trouble with shortness of breath. His blood pressure is on the low end today, and he continues to take 5 mg of lisinopril daily. He had left knee surgery about a year or two ago for a meniscus injury. He continues to have some joint pain, but he is getting over it. His sinus problems are improving. He does not have any bulges that concern him for hernia. His hemoglobin has been low ever since he had cancer, and he is not able to donate blood. Comprehensive review of systems negative except as noted above.     PFSH:  He was hoping to go to [place] on vacation this winter, but he ended up going to the [facility] instead. He has been spending a lot of time with his children and grandchildren. He bikes daily for exercise. He works for the [employer].     History reviewed. No pertinent past medical history.  Past Surgical History:   Procedure Laterality Date     APPENDECTOMY       FOOT FRACTURE SURGERY Right 02/15/2010     KNEE ARTHROSCOPY W/ MENISCECTOMY Left 07/01/2016     NEPHRECTOMY Left      Family History   Problem Relation Age of Onset     Hypertension Father      Heart attack Father      No Medical Problems Mother      No Medical Problems Brother      Social History     Social History     Marital status:      Spouse name: N/A     Number of children: N/A      "Years of education: N/A     Occupational History     Not on file.     Social History Main Topics     Smoking status: Never Smoker     Smokeless tobacco: Never Used     Alcohol use No     Drug use: No     Sexual activity: Yes     Partners: Female     Other Topics Concern     Not on file     Social History Narrative       VITALS:  Vitals:    04/16/18 0807   BP: 98/58   Pulse: (!) 58   Weight: 183 lb (83 kg)   Height: 5' 9.69\" (1.77 m)     Wt Readings from Last 3 Encounters:   04/16/18 183 lb (83 kg)   03/23/17 186 lb 14.4 oz (84.8 kg)   12/15/16 187 lb (84.8 kg)     Body mass index is 26.5 kg/(m^2).      PHYSICAL EXAM:  General Appearance: Alert, cooperative, no distress, appears stated age.  HEENT: EOMI, fundi not observed, some cerumen in left TM - removed by MD, TMs normal, mouth and throat without lesions.  Neck: Supple without adenopathy or thyromegaly.  Back: No CVA tenderness or spinous process pain.  Lungs: Clear to auscultation bilaterally, good air movement.  Heart: Regular rate and rhythm, S1 and S2 normal, no murmur or bruit.  Abdomen: Soft, non-tender, no HSM or masses. Horizontal scar across abdomen from right side to left flank. Appendectomy scar RLQ.   Genitourinary: Normal male, testes descended without masses or hernias.  Rectal exam:  Normal size for age, relatively flat, without nodules.   Musculoskeletal: No gross abnormalities.  Extremities: No CCE, pulses II/IV and symmetric,   Skin: No worrisome lesions noted.  Lymph nodes: Cervical, supraclavicular, groin, and axillary nodes normal.  Neurologic: CNII-XII intact, strength V/V and symmetric, DTRs II/IV and symmetric, sensory grossly intact  Psychiatric: he has a normal mood and affect.     Notes Reviewed, additional history from source other than patient (2 TOTAL): Reviewed 12/15/2016 cardiology note regarding CAD.     Accessed Care Everywhere, Requested Records, Consult with Physician (1 TOTAL): Stress test ordered.    Radiology tests " summarized or ordered (XR, CT, MRI, DXA, US): None.    Labs reviewed or ordered (1 TOTAL): Labs from 3/23/2017 reviewed. Labs ordered.     Medicine tests reviewed or ordered (ECG, echocardiogram, colonoscopy, EGD, venous US) (1 TOTAL): None.      Independent review of ECG or XR (2 EACH): None.    MEDICATIONS:  Current Outpatient Prescriptions   Medication Sig Dispense Refill     aspirin 81 MG EC tablet Take 162 mg by mouth daily.       atorvastatin (LIPITOR) 80 MG tablet TAKE ONE TABLET BY MOUTH AT BEDTIME  30 tablet 11     lisinopril (PRINIVIL,ZESTRIL) 5 MG tablet TAKE ONE TABLET BY MOUTH ONE TIME DAILY  32 tablet 11     nitroglycerin (NITROSTAT) 0.4 MG SL tablet Place 1 tablet (0.4 mg total) under the tongue every 5 (five) minutes as needed for chest pain. 60 tablet 0     polyethylene glycol (MIRALAX) 17 gram packet Take 17 g by mouth daily.       sildenafil (REVATIO) 20 mg tablet 1-5 tabs daily as needed 90 tablet 1     No current facility-administered medications for this visit.        The visit lasted a total of 29 minutes face to face with the patient. Over 50% of the time was spent counseling and educating the patient about health maintenance.    I, Albaro Yoon, am scribing for and in the presence of, Dr. Tamayo.    I, Dr. Tamayo, personally performed the services described in this documentation, as scribed by Albaro Yoon in my presence, and it is both accurate and complete.    Dragon dictation was used for this note.  Speech recognition errors are a possibility.      Total data points: 4

## 2021-06-18 NOTE — PROGRESS NOTES
Jose Morenoanalilia  [address]  [place] [address]  [phone] (home)     Primary cardiologist:  Dr. Johnson  PCP:  Dr. Tamayo  Procedure:  Coronary angiogram, possible coronary intervention  H&P completed by:  Dr. Patel 6-21-18  Case MD:  Dr. Bourgeois or Alli  Admit date and time:  Fri. 6-22-18 @ 0800  Case start time:  1030  Ordering MD:  Dr. Patel  Diagnosis:  Abn nuc  Anticoagulation: None  Antiarrhythmics: na  CPAP: No  Bypass Grafts: No  Renal Issues: Yes - patient has one kidney  Allergies: NKA  Diabetic?: No  Device?: No  Type:  NA    Angiogram Teaching    Reason for Visit:  Patient seen for pre-procedure education in preparation for: CA poss PCI    Precedure Prep:  Cardiologist note dated: 6-21-18  EKG results obtained, dated: on admission  Pertinent test results obtained, dated: 6-19-18 nuc - (archive) 12-19-12 PCI, 3-2-10 PCI  Hemogram results obtained: on admission  Basic Metabolic Panel results obtained: on admission  Lipid Profile results obtained: 4-16-18    Patient Education  Explained indications/risks for diagnostic evaluation, including one or more of the following:  left heart catheterization, right heart catheterization, coronary angiogram, left ventriculogram, percutaneous arteritomy closure, less than 0.1% of acute myocardial infarction and CVA or death or any of the following to the degree that it could threaten life:  allergic reaction, arrhythmia, renal failure, hemorrhage, thrombosis and infection  Explained indications/risks for therapeutic interventions, including one or more of the following: PTCA, artherectomy, stent, intravascular ultrasound, valvuloplasty, intraaortic balloon pump, 2% or less risk of MI, less than 1% risk of CVA and emergency heart surgery.  These risks are in addition to baseline risks associated with a Diagnostic Evaluation.  Patient states understanding of procedure and risks and agrees to proceed.    Pre-procedure instructions  Patient instructed to be NPO after  midnight.  Patient instructed to arrange for transportation home following procedure.  Patient instructed to have a responsible adult with them for 24 hours post-procedure.  Post-procedure follow up process.  Conscious sedation discussed.    Pre-procedure medication instructions  Patient instructed on antiplatelet medication.  Continue medications as scheduled, with a small amount of water on the day of the procedure unless indicated.  Other medication: instructed to Hold OTC supp , Take 4 Baby ASA a.m. of the procedure.        Patient Active Problem List   Diagnosis     Dyslipidemia, goal LDL below 70     Coronary Artery Disease     Kidney Cancer     Basal Cell Carcinoma Of The Skin     Abnormal stress echocardiogram     Coronary artery disease involving native coronary artery of native heart with other form of angina pectoris (H)       Current Outpatient Prescriptions   Medication Sig Dispense Refill     aspirin 81 MG EC tablet Take 162 mg by mouth 2 (two) times a day.        atorvastatin (LIPITOR) 80 MG tablet TAKE ONE TABLET BY MOUTH AT BEDTIME  30 tablet 11     nitroglycerin (NITROSTAT) 0.4 MG SL tablet Place 1 tablet (0.4 mg total) under the tongue every 5 (five) minutes as needed for chest pain. 60 tablet 0     polyethylene glycol (MIRALAX) 17 gram packet Take 17 g by mouth daily.       sildenafil (REVATIO) 20 mg tablet 1-5 tabs daily as needed 90 tablet 1     No current facility-administered medications for this visit.        No Known Allergies    Discussion/Summary  Patient seen in clinic today, accompanied by wife Morena, for procedure teach - patient has good recollection of previous stent procedures and has tolerated contrast dye without problems - patient given handouts and explanation of procedure - both patient and his wife verbalized understanding of procedure and instructions - wife will be .    Confirmed patient has not used Sildenafil for past 5 days - informed him that he should continue to  hold med until after procedure.      Plan  Labs/EKG on admission  Orders placed per cosign required protocol 6-21-18.     Jing Rodriguez RN

## 2021-06-18 NOTE — PATIENT INSTRUCTIONS - HE
Patient Instructions by Lottie López PA-C at 7/28/2020  7:10 AM     Author: Lottie López PA-C Service: -- Author Type: Physician Assistant    Filed: 7/28/2020  7:55 AM Encounter Date: 7/28/2020 Status: Signed    : Lottie López PA-C (Physician Assistant)       Patient Education     Stable Angina  The chest discomfort you have appears to be coming from your heart--a condition called angina. Angina is a pain in the heart due to poor blood flow to the heart muscle. This can occur when plaque builds up on the artery wall or a blood clot forms in one or more of the small blood vessels that deliver oxygen to the heart muscle. Plaque is a fatty material made up of cholesterol, calcium deposits, and other materials.  Exercise, increased activity, emotional upset, or stress can trigger this pain. With proper treatment and lifestyle changes to reduce risk factors, most people with angina are able to maintain a full and active life.  Angina is not a heart attack. But if angina pain is severe or prolonged, it is a sign of an impending heart attack, also called acute myocardial infarction, or AMI. Your angina is under control at this time. Therefore, it is safe for you to go home. Follow up as instructed by your doctor for any further tests and office visits.    Home care    Rest at home today and avoid any emotional or physically strenuous activity.    Take medicine (usually nitroglycerin) for chest pain exactly as prescribed. Keep your nitroglycerin with you at all times.    When taking nitroglycerin for angina, sit or lie down. The medicine may make you feel dizzy.  ? Place one tablet under your tongue, or between your lip and gum, or between your cheek and gum. Let the tablet dissolve completely; do not chew or swallow the tablet.  ? If you use a spray, then spray once on or under your tongue. Do not inhale. Right after you use the spray, close your mouth. Wait a few seconds before you  swallow.  ? After taking one tablet or spraying once, continue sitting or lying for 5 minutes.  ? If the angina goes away completely, rest awhile and continue your normal routine.  Note: Your healthcare provider may give you slightly different instructions than those above. If so, follow them carefully.  Prevention    Learn how to take your own blood pressure. Keep a record of your results. Ask your doctor which readings mean that you need medical attention. Reduce salt intake and follow lifestyle change instructions if you have high blood pressure (hypertension).    Maintain a healthy weight. Get help to lose any extra pounds. Talk to your doctor about a safe diet program. Sudden large weight losses can be dangerous.    If you have diabetes, talk to your doctor about healthy control of your blood sugar.    Begin an exercise program. Ask your doctor how to get started. You can benefit from simple activities such as walking or gardening. Short, high intensity exercise sessions may also be beneficial.    Break the smoking habit. Enroll in a stop-smoking program to improve your chances of success.    Get adequate rest.    Stay away from stressful situations. Learn stress-management techniques.  Follow-up care  Follow up with your doctor, or as advised.  If an X-ray was done , it will be reviewed by another specialist. You will be notified of any new findings that may affect your care.  Call 911  This is the fastest and safest way to get to the nearest emergency department. The paramedics can also start treatment on the way to the hospital, saving valuable time for your heart.    If angina gets worse, it continues, or if it stops and returns, call 911 right away. Don't delay. You may be having a heart attack.    After you call 911, take a second nitroglycerine tablet or spray unless instructed otherwise. When repeating doses, sit down if possible because it can make you feel lightheaded or dizzy. Wait another 5 minutes.  "If the angina still does not go away, take a third tablet or spray. Don't take more than 3 tablets or sprays within 15 minutes. Stay on the phone with 911 for more instructions.    Your healthcare provider may give you slightly different instructions than those above. If so, follow them carefully.  Don't wait until your symptoms are severe to call 911. Other reasons to call 911 besides chest pain include:    Trouble breathing    Feeling lightheaded, faint, or dizzy    Rapid heart beat    Slower than usual heart rate compared to your normal    Angina with weakness, dizziness, fainting, heavy sweating, nausea, or vomiting    Extreme drowsiness, or confusion    Weakness of an arm or leg or on one side of the face    Difficulty with speech or vision  When to seek medical advice  Remember, the signs and symptoms of a heart attack are not always like they are on TV. Sometimes they are not so obvious. You may only feel weak or just \"not right.\" If it is not clear or if you have any doubt, call for advice.    Seek help if there is a change in the type of pain, if it feels different, or if your symptoms are mild.    Don't drive yourself. Have someone else drive. If no one can drive you, call 911.    If your doctor has given you medicine to take when you have symptoms, take them, but do not delay getting  help.    Don't delay. Fast diagnosis and treatment can prevent or  limit the amount of heart damage during a heart attack or stroke.    Don't go to your doctor's office or a clinic because they may not be able to provide all the testing and treatment you need.  Date Last Reviewed: 12/30/2015 2000-2017 [employer]. [address] [place] [address], [place] [address]. All rights reserved. This information is not intended as a substitute for professional medical care. Always follow your healthcare professional's instructions.                "

## 2021-06-18 NOTE — LETTER
Letter by Aki Bolaños MD at      Author: Aki Bolaños MD Service: -- Author Type: --    Filed:  Encounter Date: 2/18/2019 Status: (Other)       Jose Lynn  [address] [place] [address]             February 18, 2019         Dear Mr. Lynn,    Below are the results from your recent visit:    Resulted Orders   Comprehensive Metabolic Panel   Result Value Ref Range    Sodium 140 136 - 145 mmol/L    Potassium 4.4 3.5 - 5.0 mmol/L    Chloride 105 98 - 107 mmol/L    CO2 28 22 - 31 mmol/L    Anion Gap, Calculation 7 5 - 18 mmol/L    Glucose 78 70 - 125 mg/dL    BUN 17 8 - 22 mg/dL    Creatinine 0.87 0.70 - 1.30 mg/dL    GFR MDRD Af Amer >60 >60 mL/min/1.73m2    GFR MDRD Non Af Amer >60 >60 mL/min/1.73m2    Bilirubin, Total 0.9 0.0 - 1.0 mg/dL    Calcium 9.5 8.5 - 10.5 mg/dL    Protein, Total 6.5 6.0 - 8.0 g/dL    Albumin 3.9 3.5 - 5.0 g/dL    Alkaline Phosphatase 63 45 - 120 U/L    AST 25 0 - 40 U/L    ALT 21 0 - 45 U/L    Narrative    Fasting Glucose reference range is 70-99 mg/dL per  American Diabetes Association (ADA) guidelines.       Kidney and liver tests are normal.  Blood sugar is normal.    No change in treatment plan.    Please call with questions or contact us using [employer].    Sincerely,        Electronically signed by Aki Bolaños MD

## 2021-06-19 NOTE — LETTER
Letter by Aki Bolaños MD at      Author: Aki Bolaños MD Service: -- Author Type: --    Filed:  Encounter Date: 6/4/2019 Status: (Other)         Jose Lynn  [address] [place] [address]             June 4, 2019         Dear Mr. Lynn,    Below are the results from your recent visit:    Resulted Orders   Comprehensive Metabolic Panel   Result Value Ref Range    Sodium 141 136 - 145 mmol/L    Potassium 4.3 3.5 - 5.0 mmol/L    Chloride 105 98 - 107 mmol/L    CO2 29 22 - 31 mmol/L    Anion Gap, Calculation 7 5 - 18 mmol/L    Glucose 91 70 - 125 mg/dL    BUN 14 8 - 22 mg/dL    Creatinine 0.85 0.70 - 1.30 mg/dL    GFR MDRD Af Amer >60 >60 mL/min/1.73m2    GFR MDRD Non Af Amer >60 >60 mL/min/1.73m2    Bilirubin, Total 0.9 0.0 - 1.0 mg/dL    Calcium 9.9 8.5 - 10.5 mg/dL    Protein, Total 6.8 6.0 - 8.0 g/dL    Albumin 4.1 3.5 - 5.0 g/dL    Alkaline Phosphatase 75 45 - 120 U/L    AST 28 0 - 40 U/L    ALT 29 0 - 45 U/L    Narrative    Fasting Glucose reference range is 70-99 mg/dL per  American Diabetes Association (ADA) guidelines.   HM2(CBC w/o Differential)   Result Value Ref Range    WBC 4.4 4.0 - 11.0 thou/uL    RBC 4.19 (L) 4.40 - 6.20 mill/uL    Hemoglobin 12.7 (L) 14.0 - 18.0 g/dL    Hematocrit 38.0 (L) 40.0 - 54.0 %    MCV 91 80 - 100 fL    MCH 30.3 27.0 - 34.0 pg    MCHC 33.5 32.0 - 36.0 g/dL    RDW 12.3 11.0 - 14.5 %    Platelets 184 140 - 440 thou/uL    MPV 7.3 7.0 - 10.0 fL   Lipid Cascade   Result Value Ref Range    Cholesterol 128 <=199 mg/dL    Triglycerides 49 <=149 mg/dL    HDL Cholesterol 52 >=40 mg/dL    LDL Calculated 66 <=129 mg/dL    Patient Fasting > 8hrs? Yes    PSA (Prostatic-Specific Antigen), Annual Screen   Result Value Ref Range    PSA 0.1 0.0 - 4.5 ng/mL    Narrative    Method is Abbott Prostate-Specific Antigen (PSA)  Standard-WHO 1st International (90:10)       Kidney and liver tests are normal.  Blood sugar is normal.    Hemoglobin level is stable.  Other blood counts are  normal.    Excellent cholesterol levels.    Prostate test is normal.    Labs look good.  No change in treatment plan.    Please call with questions or contact us using MyChart.    Sincerely,        Electronically signed by Aki Bolaños MD

## 2021-06-20 NOTE — LETTER
Letter by Jing Rodriguez RN at      Author: Jing Rodriguez RN Service: -- Author Type: --    Filed:  Encounter Date: 8/31/2020 Status: (Other)         August 31, 2020        To Whom It May Concern,       Mr. Jose Lynn is a patient of [facility].  He recently underwent stent placement on 8/13/2020.  The procedure went well without complications.  He has not had any further anginal discomfort since his procedure.  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       Sincerely,        Janette Johnson MD

## 2021-06-21 NOTE — PROGRESS NOTES
Hospital discharge follow up call to pt, no answer.  LVM that RN will try back another time, & reminded pt of their upcoming hospital f/u appt w/Jo Martin NP, 11/30/18, 10:40am.    Loren Paz RN Care Manager, Population Health

## 2021-06-22 NOTE — PROGRESS NOTES
Second attempt to reach patient for hospital discharge follow up.  No answer.  RN has made two unsuccessful attempts to reach patient.   Encounter closed.         Loren Paz RN Care Manager, Population Health

## 2021-06-22 NOTE — PROGRESS NOTES
"ASSESSMENT/PLAN:   1. Acute non-recurrent pansinusitis  amoxicillin-clavulanate (AUGMENTIN) 875-125 mg per tablet     Symptoms are suggestive of a  bacterial sinusitis given persistence of sinus pressure and congestion, despite resolution of other URI symptoms.  No evidence for sinus abscess, CNS infection or mass, or any other more malicious etiology of symptoms today.  We will treat with Augmentin.  Also discussed and prescribed other supportive cares.    At the end of the encounter, I discussed diagnosis, medications. Discussed red flags for immediate return to clinic/ER, as well as indications for follow up if no improvement.Please view below patient instructions for patient education and return precautions as were discussed during visit.   Patient understood and agreed to plan. Patient was appropriate for discharge.      Patient Instructions:  Patient Instructions   I believe a sinus infection is the cause of your symptoms. I think this is a likely bacterial infection.  I have sent a prescription for Augmentin to your pharmacy.  Take this twice daily with food. Take a probiotic such as Culturelle or Florastor while on the antibiotic or eat a Greek yogurt containing \"live active cultures\" daily.       For relief of your symptoms:     Use Keon's vapor rub on your nostrils to promote air flow through your nose    Take hot steam showers/baths at least 2x per day until sinuses are cleared    Apply warm packs to the face.      Drink plenty of fluids to assist with clearing of secretions    Use afrin spray as prescribed for nasal congestion for the next 3 days.     Take Tylenol or Ibuprofen for pain. Do not take more than: Tylenol 1000 mg 4 times a day = 4000 mg per day. Ibuprofen 600 mg 5 times a day = 3000 mg WITH FOOD  Nasal saline rinses/sprays 1-2 times daily. Obtain nasal saline spray (Ayr or Ocean are brand names).  Get into a hot shower, and wait for the sensation of your sinuses \"opening\". Occlude one side of " your nose, and use a gentle spray of saline into the opposite side of your nose.  Then blow your nose to try to mobilize the nasal secretions.    Please take your medicines as recommended above and review the discharge instructions for concerning signs/symptoms that would require your prompt return to the clinic for further evaluation. Please follow up in clinic as we have recommended below.  If your symptoms worsen prior to your follow up appointment, do not hesitate to return here to the clinic or emergency department for further evaluation.                            SUBJECTIVE:   Jose Lynn is a 63 y.o. male  who presents today for evaluation of sinus congestion, runny nose, facial pain and headaches for the past 2 weeks.  Denies cough.  No fevers.  No vomiting or diarrhea.  No dizziness.  No known ill contacts.  Has used cough drops with some relief.        Past Medical History:  Patient Active Problem List   Diagnosis     Dyslipidemia, goal LDL below 70     Coronary Artery Disease     Kidney Cancer     Basal Cell Carcinoma Of The Skin     Abnormal stress echocardiogram     Coronary artery disease involving native coronary artery of native heart with other form of angina pectoris (H)     Palpitations     History of coronary artery disease     Dyslipidemia     Paroxysmal atrial fibrillation (H)       Surgical History:  Past Surgical History:   Procedure Laterality Date     APPENDECTOMY       CORONARY ANGIOPLASTY WITH STENT PLACEMENT  2010     CORONARY ANGIOPLASTY WITH STENT PLACEMENT  2012     CV CORONARY ANGIOGRAM N/A 6/22/2018    Procedure: Coronary Angiogram;  Surgeon: Shanique Bourgeois MD;  Location: [facility];  Service:      CV LEFT HEART CATHETERIZATION WITH LEFT VENTRICULOGRAM N/A 6/22/2018    Procedure: Left Heart Catheterization with Left Ventriculogram;  Surgeon: Shanique Bourgeois MD;  Location: [facility];  Service:      FOOT FRACTURE SURGERY Right 02/15/2010     KNEE ARTHROSCOPY  W/ MENISCECTOMY Left 07/01/2016     NEPHRECTOMY Left            Family History:  Family History   Problem Relation Age of Onset     Hypertension Father      Heart attack Father      No Medical Problems Mother      No Medical Problems Brother        Reviewed; Non-contributory      Social History:    Social History     Tobacco Use   Smoking Status Never Smoker   Smokeless Tobacco Never Used     Smoking:  Alcohol use:  Other drug use:  Occupation:        Current Medications:  Current Outpatient Medications on File Prior to Visit   Medication Sig Dispense Refill     aspirin 81 MG EC tablet Take 162 mg by mouth daily.        atorvastatin (LIPITOR) 80 MG tablet Take 80 mg by mouth at bedtime.       cholecalciferol, vitamin D3, 1,000 unit tablet Take 1,000 Units by mouth daily.       multivitamin therapeutic tablet Take 1 tablet by mouth daily.       omega-3/dha/epa/fish oil (FISH OIL-OMEGA-3 FATTY ACIDS) 300-1,000 mg capsule Take 2 g by mouth daily.       polyethylene glycol (MIRALAX) 17 gram packet Take 17 g by mouth daily.       propafenone (RYTHMOL SR) 225 MG 12 hr capsule Take 1 capsule (225 mg total) by mouth 2 (two) times a day. 180 capsule 2     sildenafil citrate (SILDENAFIL ORAL) Take 20 mg by mouth daily as needed.       sildenafil, antihypertensive, (REVATIO) 20 mg tablet Take 1 tablet (20 mg total) by mouth 3 (three) times a day. 90 tablet 1     [DISCONTINUED] atorvastatin (LIPITOR) 80 MG tablet TAKE ONE TABLET BY MOUTH AT BEDTIME 30 tablet 0     No current facility-administered medications on file prior to visit.        Allergies:   No Known Allergies    I personally reviewed patient's past medical, surgical, social, family history and allergies.    ROS:  Review of Systems        OBJECTIVE:   Vitals:    01/07/19 1031   BP: 128/76   Patient Site: Right Arm   Patient Position: Sitting   Cuff Size: Adult Regular   Pulse: 60   Resp: 18   Temp: 98.1  F (36.7  C)   TempSrc: Oral   SpO2: 98%   Weight: 188 lb (85.3  kg)           General Appearance:  Alert, well-appearing male in NAD. Afebrile.    HEENT:  Head: Atraumatic, normocephalic. Face nontraumatic.  Eyes: Conjunctiva clear, Lids normal.  Ears:  TMs pearly, translucent bilaterally. No canal erythema or edema.  Nose: nares patent. Moderate erythema of nasal mucosa. No rhinorrhea. Maxillary sinus tenderness to palpation.  Oropharynx: No posterior pharyngeal erythema. No petechiae, no exudate. No tonsillar hypertrophy. Uvula midline. Moist mucus membranes.  Neck: Supple, no lymphadenopathy.  No meningismus.  Respiratory: No distress. Lungs clear to ausculation bilaterally. No crackles, wheezes, rhonchi or stridor.  Cardiovascular: Regular rate and rhythm, no murmur, rub or gallop. No obvious chest wall deformities.  Musculoskeletal: No swelling or erythema of extremities. Moves all extremities equally.  Neurologic: Alert and orientated appropriately. No focal deficits. Facial movements symmetric. Coordination and speech intact.         Radiology:  I personally ordered and viewed this study. I agree with below radiology findings.    none    Laboratory Studies:  I personally ordered and interpreted these studies.    none      Teresita Forbes, CNP

## 2021-06-22 NOTE — PATIENT INSTRUCTIONS - HE
"I believe a sinus infection is the cause of your symptoms. I think this is a likely bacterial infection.  I have sent a prescription for Augmentin to your pharmacy.  Take this twice daily with food. Take a probiotic such as Culturelle or Florastor while on the antibiotic or eat a Greek yogurt containing \"live active cultures\" daily.       For relief of your symptoms:     Use Keon's vapor rub on your nostrils to promote air flow through your nose    Take hot steam showers/baths at least 2x per day until sinuses are cleared    Apply warm packs to the face.      Drink plenty of fluids to assist with clearing of secretions    Use afrin spray as prescribed for nasal congestion for the next 3 days.     Take Tylenol or Ibuprofen for pain. Do not take more than: Tylenol 1000 mg 4 times a day = 4000 mg per day. Ibuprofen 600 mg 5 times a day = 3000 mg WITH FOOD  Nasal saline rinses/sprays 1-2 times daily. Obtain nasal saline spray (Ayr or Ocean are brand names).  Get into a hot shower, and wait for the sensation of your sinuses \"opening\". Occlude one side of your nose, and use a gentle spray of saline into the opposite side of your nose.  Then blow your nose to try to mobilize the nasal secretions.    Please take your medicines as recommended above and review the discharge instructions for concerning signs/symptoms that would require your prompt return to the clinic for further evaluation. Please follow up in clinic as we have recommended below.  If your symptoms worsen prior to your follow up appointment, do not hesitate to return here to the clinic or emergency department for further evaluation.      "

## 2021-06-22 NOTE — PROGRESS NOTES
Clinic Note    Assessment:     Assessment and Plan:  1. Paroxysmal atrial fibrillation (H)  He is doing well on his Rythmol.  I sent in a new prescription for him to start 225 mg SR twice daily.  We will get him established with a new PCP in February.  He has follow-up with cardiology in 1 month.  No reports of any palpitations.  No chest pain or shortness of breath.    2. Screen for colon cancer  - Ambulatory referral for Colonoscopy       Patient Instructions   I will call the pharmacist and ask about the conversion from immediate release to extended release Rythmol.    In the meantime, we will get you scheduled with Dr. Alejandro for an establish care visit in February.    You cannot use your nitroglycerin tablets while taking the sildenafil tablets.    If you need to see a provider between now and when you establish with Dr. Alejandro, you can come to the clinic and see me.             Subjective:      Patient comes to clinic today for hospital discharge follow-up.    He presented to the hospital with palpitations.  He had serial troponins done which were negative.  Cardiology was consulted and he was started on Rythmol SR to 25 mg twice per day to prevent recurrent issues with atrial fibrillation.  He recently had a cardiac event monitor test completed but is awaiting the results.    He has follow-up with his cardiologist in 1 month.    Apparently, the hospital pharmacy did not have sustained release Rythmol and so he was discharged on immediate release (150 mg 3 times daily).    Today, the patient denies any recurrent issues with palpitations.  No chest pain or shortness of breath.    In general, he is feeling quite well today.    The following portions of the patient's history were reviewed and updated as appropriate: Allergies, medications, problem list, prior note.     Review of Systems:    Review is otherwise negative except for what is mentioned above.     Social Hx:    Social History     Tobacco Use   Smoking  Status Never Smoker   Smokeless Tobacco Never Used         Objective:     Vitals:    11/30/18 1447   BP: 112/64   Pulse: 62   Weight: 188 lb 3.2 oz (85.4 kg)       Exam:    General: No apparent distress. Calm. Alert and Oriented X3. Pt behavior is appropriate.  Chest/Lungs: Normal chest wall, clear to auscultation, normal respiratory effort and rate.   Heart/Pulses: Regular rate and rhythm, strong and equal radial pulses, no murmurs. Capillary refill <2 seconds. No edema.   Neurologic: Interactive, alert, no focal findings, CNs intact.   Skin: Warm, dry. Normal hair pattern. Free of lesions. Normal skin turgor.       Patient Active Problem List   Diagnosis     Dyslipidemia, goal LDL below 70     Coronary Artery Disease     Kidney Cancer     Basal Cell Carcinoma Of The Skin     Abnormal stress echocardiogram     Coronary artery disease involving native coronary artery of native heart with other form of angina pectoris (H)     Palpitations     History of coronary artery disease     Dyslipidemia     Paroxysmal atrial fibrillation (H)     Current Outpatient Medications   Medication Sig Dispense Refill     aspirin 81 MG EC tablet Take 162 mg by mouth daily.        atorvastatin (LIPITOR) 80 MG tablet Take 80 mg by mouth at bedtime.       cholecalciferol, vitamin D3, 1,000 unit tablet Take 1,000 Units by mouth daily.       multivitamin therapeutic tablet Take 1 tablet by mouth daily.       omega-3/dha/epa/fish oil (FISH OIL-OMEGA-3 FATTY ACIDS) 300-1,000 mg capsule Take 2 g by mouth daily.       polyethylene glycol (MIRALAX) 17 gram packet Take 17 g by mouth daily.       sildenafil citrate (SILDENAFIL ORAL) Take 20 mg by mouth daily as needed.       propafenone (RYTHMOL SR) 225 MG 12 hr capsule Take 1 capsule (225 mg total) by mouth 2 (two) times a day. 180 capsule 2     sildenafil, antihypertensive, (REVATIO) 20 mg tablet Take 1 tablet (20 mg total) by mouth 3 (three) times a day. 90 tablet 1     No current  facility-administered medications for this visit.        I spent 25 minutes with patient face to face, of which >50% was counseling regarding the above plan       Maikol Yanez CNP (Rob)    11/30/2018

## 2021-06-23 NOTE — TELEPHONE ENCOUNTER
Former patient of Roni & has not established care with another provider.  Please assign refill request to covering provider per Clinic standard process.      RN cannot approve Refill Request    RN can NOT refill this medication historical medication requested.     Vonda Vasquez, [facility] Connection Triage/Med Refill 1/28/2019    Requested Prescriptions   Pending Prescriptions Disp Refills     atorvastatin (LIPITOR) 80 MG tablet [Pharmacy Med Name: Atorvastatin Calcium Oral Tablet 80 MG] 30 tablet 0     Sig: TAKE ONE TABLET BY MOUTH AT BEDTIME    Statins Refill Protocol (Hmg CoA Reductase Inhibitors) Passed - 1/26/2019 10:16 AM       Passed - PCP or prescribing provider visit in past 12 months     Last office visit with prescriber/PCP: Visit date not found OR same dept: 11/30/2018 Maikol Yanez CNP OR same specialty: 11/30/2018 Maikol Yanez CNP  Last physical: Visit date not found Last MTM visit: Visit date not found   Next visit within 3 mo: Visit date not found  Next physical within 3 mo: Visit date not found  Prescriber OR PCP: Britney Hayes MD  Last diagnosis associated with med order: 1. Hyperlipidemia  - atorvastatin (LIPITOR) 80 MG tablet [Pharmacy Med Name: Atorvastatin Calcium Oral Tablet 80 MG]; TAKE ONE TABLET BY MOUTH AT BEDTIME  Dispense: 30 tablet; Refill: 0    If protocol passes may refill for 12 months if within 3 months of last provider visit (or a total of 15 months).

## 2021-06-24 NOTE — PROGRESS NOTES
[facility] Follow Up Note    Jose Lynn   63 y.o. male    Date of Visit: 2/15/2019    Chief Complaint   Patient presents with     Establish Care     Subjective  Jose is here to establish care and review his cardiac condition.    Patient has coronary artery disease with drug-eluting stents of the proximal and mid LAD in 2010.  Drug-eluting stent of the OM in 2012.  Patient reportedly had a silent heart attack in the past.  He denies chest pain history.    Stress test last summer did show some distal anterior and apical ischemia.  He underwent an angiogram June 2018 which showed patent stents, normal ejection fraction and no intervention.    Patient has been on Lipitor 80 mg a day.  Well-controlled cholesterol with LDL 73 and HDL 45 April of last year.    He is on aspirin 162 mg a day.  No epigastric pain or history of GI bleeding.    Patient had an episode of paroxysmal atrial fibrillation that occurred spontaneously in the middle the night in November.  He went to the hospital, was put on Rythmol, but spontaneously converted to normal sinus rhythm and is not had any evidence of recurrence since then.  No palpitations since then.  Cardiology felt he did not need to be on full anticoagulation and he is been on aspirin 162 mg.    Patient denies history of sleep apnea or daytime sleepiness.  He does not drink significant alcohol.  No history of diabetes, he is not obese.    He did not have a clear precipitating reason for his atrial fibrillation.  His troponin I was negative.  BNP was 41 and normal TSH last November.    Patient is active, does exercise bike for 20 minutes a day, no increasing shortness of breath or chest pain with that.    No muscle achiness or liver problems with Lipitor 80 mg.    Family history of hypertension and coronary disease with father.    Patient is never smoked.    November 2018 chest x-ray clear.    Patient has a history of a left kidney tumor removed back in 1997.  He  has had multiple scans since then with no evidence of recurrence.  He was described as a hypernephroma.  Patient is congenitally missing right kidney.  His creatinine has been normal, creatinine was 0.8 November 2018.  No lower extremity edema issues.    PSA was 0.2 April 2018.    His blood pressure has been normal.    He had a low blood pressure on lisinopril in the past and it was discontinued last year.    He was treated for sinusitis with Augmentin last month, that all cleared up.    , lives independently.  Has children and grandchildren.  Works at the [employer], does draw blood.    Had a colonoscopy in 2002 that was negative.  No family history of colonoscopy but has been over 10 years since she has had a screening colonoscopy.    PMHx:    Past Medical History:   Diagnosis Date     Cancer (H)     Kidney     Coronary artery disease      PSHx:    Past Surgical History:   Procedure Laterality Date     APPENDECTOMY       CORONARY ANGIOPLASTY WITH STENT PLACEMENT  2010     CORONARY ANGIOPLASTY WITH STENT PLACEMENT  2012     CV CORONARY ANGIOGRAM N/A 6/22/2018    Procedure: Coronary Angiogram;  Surgeon: Shanique Bourgeois MD;  Location: [facility] Cath Lab;  Service:      CV LEFT HEART CATHETERIZATION WITH LEFT VENTRICULOGRAM N/A 6/22/2018    Procedure: Left Heart Catheterization with Left Ventriculogram;  Surgeon: Shanique Bourgeois MD;  Location: [facility] Cath Lab;  Service:      FOOT FRACTURE SURGERY Right 02/15/2010     KNEE ARTHROSCOPY W/ MENISCECTOMY Left 07/01/2016     NEPHRECTOMY Left      Immunizations:   Immunization History   Administered Date(s) Administered     DT (pediatric) 01/01/1994, 04/22/2003     Hepatitis A, Peds, Unspecified 07/10/2000, 04/22/2004     Influenza, Seasonal, Inj PF IIV3 10/04/2011, 09/23/2013, 10/21/2014     Td,adult,historic,unspecified 04/22/2003, 12/05/2012     Tdap 12/05/2012     Typhoid, historic, Unspecified 04/22/2004     ZOSTER, LIVE 10/17/2016       ROS A comprehensive  review of systems was performed and was otherwise negative    Medications, allergies, and problem list were reviewed and updated    Exam  /82 (Patient Site: Right Arm, Patient Position: Sitting, Cuff Size: Adult Large)   Pulse 60   Resp 16   Wt 190 lb (86.2 kg)   BMI 27.26 kg/m    Alert and oriented x3.  Pupils and irises equal and reactive.  No jaundice or conjunctivitis.  Normal pharynx, not crowded.  Teeth in good condition.  No cervical or supra clavicular adenopathy.  No JVD and no carotid bruits.  Lungs are clear to auscultation with normal respiratory excursion.  Heart is regular with no murmur rub or gallop.  +1 pedal pulses bilaterally and no ankle edema.  No hepatospleno megaly, abdomen is nontender, nonobese.  No pulsatile mass.    Assessment/Plan  1. Paroxysmal atrial fibrillation (H)  No evidence of recurrence.  Unclear if he should continue on the Rythmol.  I did discuss with patient that there is a malignant arrhythmia risk with the Rythmol.    December 2018 heart monitor without evidence of recurrent atrial fibrillation.    I also discussed the risk of stroke with paroxysmal atrial fibrillation.    There is no clear precipitating cause of the atrial fibrillation he had last fall.  Cannot rule out occult cardiac ischemia as a cause.    I would not suspect sleep apnea and he denies any alcohol.  His blood pressure has been normal.    Refer back to cardiology for treatment plan for his paroxysmal atrial fibrillation.    He will continue on aspirin 162 mg a day at this time.  - Ambulatory referral to Cardiology    2. Coronary artery disease due to calcified coronary lesion  Asymptomatic.  Good exercise tolerance.  Aspirin daily and Lipitor 80 mg.  - Ambulatory referral to Cardiology    3. Hypercholesterolemia  Lipitor 80 mg.  Follow-up for physical exam in May    4. Medication monitoring encounter    - Comprehensive Metabolic Panel    5. Screen for colon cancer  Overdue for screening  colonoscopy.  Given his history of stents I did recommend he stay on the low-dose aspirin for the week before the colonoscopy.  I did discuss bleeding risk with that, however.   - Ambulatory referral for Colonoscopy    History of congenital missing kidney and previous kidney tumor removal, but does not appear to be malignant renal cell cancer.  There is been no evidence of recurrence, and has had multiple imaging in the past.  I would not plan further follow-up of this.  Avoid nephrotoxic agents.  Checking kidney labs today.    History of sinusitis, symptoms cleared.    Return in about 3 months (around 5/15/2019) for Annual physical.   Patient Instructions   Laboratory to check kidney and liver test.  Lab results will be mailed to you in a letter.    Continue on current medications at this time.  You have been given a referral to cardiology to determine treatment plan for your history of atrial fibrillation.    [place] [facility] will contact you to set up your screening colonoscopy.  I would recommend reducing your aspirin down to 81 mg a day for the week before the colonoscopy.    Follow-up with me in 3 months for your physical exam.    Aki Bolaños MD  I spent a total time with patient of over 25 minutes and over 50% coord care.  Time all face to face.      Current Outpatient Medications   Medication Sig Dispense Refill     aspirin 81 MG EC tablet Take 162 mg by mouth daily.        atorvastatin (LIPITOR) 80 MG tablet TAKE ONE TABLET BY MOUTH AT BEDTIME  30 tablet 3     cholecalciferol, vitamin D3, 1,000 unit tablet Take 1,000 Units by mouth daily.       multivitamin therapeutic tablet Take 1 tablet by mouth daily.       omega-3/dha/epa/fish oil (FISH OIL-OMEGA-3 FATTY ACIDS) 300-1,000 mg capsule Take 2 g by mouth daily.       polyethylene glycol (MIRALAX) 17 gram packet Take 17 g by mouth daily.       propafenone (RYTHMOL SR) 225 MG 12 hr capsule Take 1 capsule (225 mg total) by mouth 2 (two) times a  day. 180 capsule 2     sildenafil citrate (SILDENAFIL ORAL) Take 20 mg by mouth daily as needed.       sildenafil, antihypertensive, (REVATIO) 20 mg tablet Take 1 tablet (20 mg total) by mouth 3 (three) times a day. 90 tablet 1     No current facility-administered medications for this visit.      No Known Allergies  Social History     Tobacco Use     Smoking status: Never Smoker     Smokeless tobacco: Never Used   Substance Use Topics     Alcohol use: No     Drug use: No

## 2021-06-24 NOTE — PATIENT INSTRUCTIONS - HE
Laboratory to check kidney and liver test.  Lab results will be mailed to you in a letter.    Continue on current medications at this time.  You have been given a referral to cardiology to determine treatment plan for your history of atrial fibrillation.    [facility] [facility] will contact you to set up your screening colonoscopy.  I would recommend reducing your aspirin down to 81 mg a day for the week before the colonoscopy.    Follow-up with me in 3 months for your physical exam.

## 2021-06-25 NOTE — TELEPHONE ENCOUNTER
Medication Request  Medication name: Patient states he fell down yesterday and injured his left ribs.  He is taking Tylenol Extra Strength, two or three every six hours.  He is asking for a stronger medication.    Patient declined office visit and [facility] Clinic because he works until 10 p.m.   Pharmacy Name and Location: [id number]  Reason for request: left rib pain, rated at a 5 out of 10 on pain scale  When did you use medication last?:  n/a  Patient offered appointment:  yes, patient declined due to work schedule  Okay to leave a detailed message: yes, please leave a detailed message.

## 2021-06-25 NOTE — TELEPHONE ENCOUNTER
He can take an Aleve 1 tablet twice a day or ibuprofen 2 tablets 3 times a day.  In addition to the Tylenol he is taking.    Narcotics cannot be called in without assessment.    If pain does continue, have him seen in urgent care or clinic for evaluation.

## 2021-06-25 NOTE — TELEPHONE ENCOUNTER
Patient notified of clinician's message and verbalized understanding. No further questions at this time.   Urvashi Balderas CMA ............... 12:29 PM, 03/18/19

## 2021-06-25 NOTE — PROGRESS NOTES
Assessment and Plan     65-year-old male with relevant past medical history of renal carcinoma surgical post partial nephrectomy by history in 1997 who presents with gross hematuria and some clotting going on for approximately 9 days.  Overall small amount.  No concern for anemia.  Unknown etiology at this time but suspect bladder pathology given clotting.  Does not have a smoking history.  Obtained UA today and confirmed hematuria.  I recommended referral to urology for further work-up specifically cystoscopy.  Also updated patient's pneumonia vaccine today.    1. Gross hematuria  - Urinalysis-UC if Indicated  - Ambulatory referral to Urology    2. Need for vaccination  - Pneumococcal polysaccharide vaccine 23-valent 1 yo or older, subq/IM    Follow up: PRN  Options for treatment and follow-up care were reviewed with the patient and/or guardian. Jose Lynn and/or guardian engaged in the decision making process and verbalized understanding of the options discussed and agreed with the final plan.    Dr. Shivam Cisneros MD         HPI:   Jose Lynn is a 65 y.o.  male with problems as below, who presents for:    Chief Complaint   Patient presents with     Concerns     blood in urine     Patient saw gross blood in urine last Wednesday.  Has been intermittent since then with some clots seen.  He has a Hx of renal cancer. No abdominal pain. Talking more about the renal cancer . Tumor was remove with partial nephrectomy at [place] in approximately 97. Had no symptoms then. Spleen was enlarged on a physical.  No dysuria or polyuria. Monogamous with wife. No concerns for STI. No symptoms. No smoking Hx.          PMHX:     Patient Active Problem List   Diagnosis     Dyslipidemia, goal LDL below 70     Coronary Artery Disease     Kidney Cancer     Basal Cell Carcinoma Of The Skin     Palpitations     Paroxysmal atrial fibrillation (H)     Sinus bradycardia     Abnormal stress test     Benign neoplasm of ascending colon      Diaphragmatic hernia     Disorder of function of stomach     Diverticular disease of large intestine     Gastric ulcer without hemorrhage or perforation     Polyp of colon       Current Outpatient Medications   Medication Sig Dispense Refill     aspirin 81 mg chewable tablet Chew 1 tablet (81 mg total) daily. Take aspirin indefinitely (for the rest of your life). 30 tablet 0     atorvastatin (LIPITOR) 80 MG tablet TAKE 1 TABLET BY MOUTH EVERYDAY AT BEDTIME 90 tablet 1     cholecalciferol, vitamin D3, 1,000 unit tablet Take 2,000 Units by mouth daily.        clopidogreL (PLAVIX) 75 mg tablet Take 1 tablet (75 mg total) by mouth daily. 90 tablet 11     multivitamin therapeutic tablet Take 1 tablet by mouth daily.       omega-3/dha/epa/fish oil (FISH OIL-OMEGA-3 FATTY ACIDS) 300-1,000 mg capsule Take 2 g by mouth daily.       polyethylene glycol (MIRALAX) 17 gram packet Take 17 g by mouth daily.       sildenafil (REVATIO) 20 mg tablet take 4 tablets by mouth 1 hour prior to activity. max 1 dose every 24 hours 40 tablet 6     nitroglycerin (NITROSTAT) 0.4 MG SL tablet Place 1 tablet (0.4 mg total) under the tongue every 5 (five) minutes as needed for chest pain. 1 Bottle 2     No current facility-administered medications for this visit.        Social History     Tobacco Use     Smoking status: Never Smoker     Smokeless tobacco: Never Used   Substance Use Topics     Alcohol use: No     Drug use: No       Social History     Social History Narrative     Not on file       No Known Allergies           Review of Systems:    Complete ROS is negative except as noted in the HPI         Physical Exam:   /78 (Patient Site: Right Arm, Patient Position: Sitting, Cuff Size: Adult Large)   Pulse (!) 47   Wt 188 lb 9.6 oz (85.5 kg)   SpO2 96%   BMI 27.06 kg/m      General appearance: Alert, cooperative, no distress, appears stated age  Head: Normocephalic, atraumatic, without obvious abnormality  Eyes: Pupils equal  round, reactive.  Conjunctiva clear.  Neck: Supple, symmetric, trachea midline  Lungs: Clear to auscultation bilaterally, no wheezing or crackles present.  Respirations unlabored  Heart: Regular rate and rhythm, normal S1 and S2, no murmur, rub or gallop.  Abdomen: Soft, nontender, nondistended.  Bowel sounds active in all 4 quadrants.  No masses or organomegaly. Scar from previous partial nephrectomy seen

## 2021-06-25 NOTE — PROGRESS NOTES
"WALK IN CARE - VISIT NOTE    HPI    Jose Lynn is a 63 y.o. male brought in by wife presenting for evaluation of left sided rib pain:    Fell on ice this weekend - Sunday (3d ago)  Was a hard surface - landed on arm which tucked in to left side his side  Did not hit head or LOC  No shortness of breath   Pain comes back with deep breath  No fevers  Was carrying some books but did not fall on those  \"Got the wind knocked out of me\"  Today when lifting felt pain/pop sensation on the same side    ROS  Complete ROS negative except as noted in HPI.    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    OBJECTIVE  Vitals:    03/20/19 1836   BP: 130/74   Pulse:    Resp:    Temp:    SpO2:        Physical Exam  General: No acute distress, sitting comfortable in chair, able to walk to exam table  Eyes: normal conjunctiva, normal sclera   Ears:  external ears normal  Nose: no rhinorrhea  Oral: moist oral mucosa  Neck: good ROM, supple  CV: RRR, distal and peripheral pulses in tact  Resp: CTA bilaterally, no wheezing, no rhonchi, no rhales, no respiratory distress  Abdomen: soft, non-tender  Chest/Back: Very tender to palpation along most lateral left side throughout rib cage, no erythema/ecchymosis, trace tenderness along erector spinae   Neuro: moves all 4 extremities, no focal deficits noted  Extrem: no edema, no cyanosis, well-perfused    Labs    No results found for this visit on 03/20/19.  Xr Ribs Left W Pa Chest    Result Date: 3/20/2019  XR RIBS LEFT W PA CHEST 3/20/2019 7:26 PM INDICATION: Pleurodynia. Fall. COMPARISON: 11/25/2018, chest CT 09/30/2013 FINDINGS: Costophrenic angles are clipped on the PA film. Lungs are clear. No evidence of a hydropneumothorax. Heart and pulmonary vascularity are normal. Two left-sided rib films obtained with a marker in " place. There are acute, minimally displaced fractures of the left lateral  6th, 9th and 10th ribs. NOTE: ABNORMAL REPORT THE DICTATION ABOVE DESCRIBES AN ABNORMALITY FOR WHICH FOLLOW-UP IS NEEDED.         ASSESSMENT/PLAN  1. Rib pain on left side  2.Closed rib fracture left side  Official radiology read pending, however I did call the radiologist and went over the image to confirm a fracture to the left rib.  Discussed warning signs and symptoms of a pneumothorax and activities/exercises to avoid to prevent this.  Recommend light work duties for a couple weeks.  Patient has Tylenol at home and will alternate between that and NSAIDs as well as lidocaine patch.  - XR Ribs Left W PA Chest; Future  - lidocaine (LIDODERM) 5 %; Remove & Discard patch within 12 hours or as directed by MD  Dispense: 15 patch; Refill: 0  - naproxen (NAPROSYN) 500 MG tablet; Take 1 tablet (500 mg total) by mouth 2 (two) times a day with meals.  Dispense: 30 tablet; Refill: 0    2. Muscle strain of left upper back, initial encounter  Likely from fall.  No spinal involvement.  Did discuss heat ice to affected area as needed.  - cyclobenzaprine (FLEXERIL) 10 MG tablet; Take 0.5-1 tablets (5-10 mg total) by mouth 3 (three) times a day as needed for muscle spasms.  Dispense: 30 tablet; Refill: 1      Options for treatment and follow-up care were reviewed with the patient and/or guardian. Discussed symptoms in which to return to clinic sooner or go to the ER for evaluation. Jose Lynn and/or guardian engaged in the decision making process and verbalized understanding of the options discussed and agreed with the final plan.    Niranjan Laird MD

## 2021-06-26 NOTE — PROGRESS NOTES
Progress Notes by Jing Rodriguez, RN at 6/21/2018  9:11 AM     Author: Jing Rodriguez RN Service: -- Author Type: Registered Nurse    Filed: 6/21/2018  9:25 AM Encounter Date: 6/21/2018 Status: Signed    : Jing Rodriguez RN (Registered Nurse)       DL ORDERING  Received: 6-21-18 @ 0852       Valentín Rodriguez RN       CA P. PCI NO LV GRAM W/EMG OR PTK ON FRI 06/22   ADMIT AT 8AM   H&P 6/21     THANKS!   VALENTÍN

## 2021-06-26 NOTE — PROGRESS NOTES
Progress Notes by Sonya Patel MD at 6/21/2018  8:20 AM     Author: Sonya Patel MD Service: -- Author Type: Physician    Filed: 6/21/2018  8:55 AM Encounter Date: 6/21/2018 Status: Signed    : Sonya Patel MD (Physician)           Click to link to [facility] HEART [facility] NOTE       Assessment/Plan:   1.  Coronary artery disease s/p MK to proximal and mid LAD in 2010 and MK to OM in 2012, positive exercise echocardiogram: The patient has silent myocardial ischemia in the past.  He had exercises stress echo which was reported positive for inducible myocardial ischemia.  His exercises stress ECG showed inducible myocardial ischemia in inferior and lateral leads with 3 mm ST segment depression.  His exercise stress echo images also demonstrate inducible myocardial ischemia in mid LAD distribution.  Discussed coronary angiogram with possible PCI with the patient and his wife.  He is aware that the risks of the procedure include but are not limited to: death, myocardial infarction, stroke, kidney dysfunction, vessel trauma, hemorrhage, need for emergency corrective surgery, allergy, and dysrhythmia.  The patient agreed. The order is placed.  Continue aspirin and Lipitor.    2.  Dyslipidemia: Continue Lipitor 80 mg daily.  Recent LDL was 73.    Thank you for the opportunity to be involved in the care of Jose Lynn. If you have any questions, please feel free to contact me.  The patient will follow-up his primary cardiologist to Dr. Johnson in 2 months..    Much or all of the text in this note was generated through the use of Dragon Dictate voice-to-text software. Errors in spelling or words which seem out of context are unintentional.   Sound alike errors, in particular, may have escaped editing.       History of Present Illness:   It is my pleasure to see Jose Lynn at the [facility] for evaluation of Follow-up.  Jose Lynn is a 62 y.o. male with a medical  history of coronary artery disease status post MK to proximal and mid LAD in 2010, MK to OM1 in 2012, essential hypertension off medication due to normal blood pressure, dyslipidemia and history of renal cancer status post surgical treatment in remission.    The patient states that he did not have chest pain or shortness of breath when he had stents in 2010 and 2012.  He had routine annual physical exam.  Exercises stress echo was requested.  The patient had normal exercise capacity and in no inducible angina.  His exercises stress ECG is positive for inducible myocardial ischemia in lateral and inferior leads, new hypokinesis in mid to distal anterior, anteroseptal and apical segments indicating inducible myocardial ischemia.     The patient denies any chest pain, shortness of breath, palpitations, dizziness, orthopnea, PND or leg edema.  His blood pressure and heart rate are in normal range.    Past Medical History:     Patient Active Problem List   Diagnosis   ? Coronary Artery Disease   ? Kidney Cancer   ? Basal Cell Carcinoma Of The Skin   ? Abnormal stress echocardiogram   ? Coronary artery disease involving native coronary artery of native heart with other form of angina pectoris (H)       Past Surgical History:     Past Surgical History:   Procedure Laterality Date   ? APPENDECTOMY     ? FOOT FRACTURE SURGERY Right 02/15/2010   ? KNEE ARTHROSCOPY W/ MENISCECTOMY Left 07/01/2016   ? NEPHRECTOMY Left        Family History:     Family History   Problem Relation Age of Onset   ? Hypertension Father    ? Heart attack Father    ? No Medical Problems Mother    ? No Medical Problems Brother         Social History:    reports that he has never smoked. He has never used smokeless tobacco. He reports that he does not drink alcohol or use illicit drugs.    Review of Systems:   General: WNL  Eyes: WNL  Ears/Nose/Throat: Hearing Loss  Lungs: WNL  Heart: WNL  Stomach: WNL  Bladder: WNL  Muscle/Joints: WNL  Skin:  "WNL  Nervous System: WNL  Mental Health: WNL     Blood: Easy Bleeding, Easy Bruising    Meds:     Current Outpatient Prescriptions:   ?  aspirin 81 MG EC tablet, Take 162 mg by mouth 2 (two) times a day. , Disp: , Rfl:   ?  atorvastatin (LIPITOR) 80 MG tablet, TAKE ONE TABLET BY MOUTH AT BEDTIME , Disp: 30 tablet, Rfl: 11  ?  nitroglycerin (NITROSTAT) 0.4 MG SL tablet, Place 1 tablet (0.4 mg total) under the tongue every 5 (five) minutes as needed for chest pain., Disp: 60 tablet, Rfl: 0  ?  polyethylene glycol (MIRALAX) 17 gram packet, Take 17 g by mouth daily., Disp: , Rfl:   ?  sildenafil (REVATIO) 20 mg tablet, 1-5 tabs daily as needed, Disp: 90 tablet, Rfl: 1    Allergies:   Review of patient's allergies indicates no known allergies.      Objective:      Physical Exam  179 lb (81.2 kg)  5' 9.5\" (1.765 m)  Body mass index is 26.05 kg/(m^2).  /78 (Patient Site: Left Arm, Patient Position: Sitting, Cuff Size: Adult Regular)  Pulse 60  Resp 16  Ht 5' 9.5\" (1.765 m)  Wt 179 lb (81.2 kg)  BMI 26.05 kg/m2    General Appearance:   Awake, Alert, No acute distress.   HEENT:  Pupil equal and reactive to light. No scleral icterus; the mucous membranes were moist.   Neck: No cervical bruits. No JVD. No thyromegaly.     Chest: The spine was straight. The chest was symmetric.   Lungs:   Respirations unlabored; Lungs are clear to auscultation. No crackles. No wheezing.   Cardiovascular:   Regular rhythm and rate, normal first and second heart sounds with no murmurs. No rubs or gallops.    Abdomen:  Soft. No tenderness. Non-distended. Bowels sounds are present   Extremities: Equal tibial pulses. No leg edema.   Skin: No rashes or ulcers. Warm, Dry.   Musculoskeletal: No tenderness. No deformity.   Neurologic: Mood and affect are appropriate. No focal deficits.         Cardiac Imaging Studies  Exercise stress ECHO on 6-:    Stress echocardiogram is positive for inducible ischemia.    The stress " electrocardiogram was abnormal with 3 mm of horizontal ST segment depression in the V4, V5, V6, II, III and aVF leads, beginning at 9.34 minutes of stress, and returning to baseline after 1.59 minutes into the recovery phase.    The patient's exercise tolerance was normal.    No symptoms were reported by the patient during exercise.    Dr Bolaños notified of test results.    Lab Review   Lab Results   Component Value Date     04/16/2018    K 4.3 04/16/2018     04/16/2018    CO2 26 04/16/2018    BUN 15 04/16/2018    CREATININE 0.80 04/16/2018    CALCIUM 9.5 04/16/2018     Lab Results   Component Value Date    WBC 4.8 04/16/2018    WBC 5.5 08/18/2015    HGB 13.0 (L) 04/16/2018    HCT 38.1 (L) 04/16/2018    MCV 89 04/16/2018     04/16/2018     Lab Results   Component Value Date    CHOL 128 04/16/2018    CHOL 114 03/23/2017    CHOL 120 08/18/2015     Lab Results   Component Value Date    HDL 45 04/16/2018    HDL 46 03/23/2017    HDL 39 (L) 08/18/2015     Lab Results   Component Value Date    LDLCALC 73 04/16/2018    LDLCALC 61 03/23/2017    LDLCALC 55 08/18/2015     Lab Results   Component Value Date    TRIG 49 04/16/2018    TRIG 37 03/23/2017    TRIG 130 08/18/2015     Lab Results   Component Value Date    TSH 1.35 04/16/2018

## 2021-06-27 NOTE — PROGRESS NOTES
Progress Notes by Janette Johnson MD at 4/18/2019  4:40 PM     Author: Janette Johnson MD Service: -- Author Type: Physician    Filed: 4/18/2019  5:22 PM Encounter Date: 4/18/2019 Status: Signed    : Janette Johnson MD (Physician)           Click to link to [facility] HEART CARE NOTE    Thank you, Dr. Bolaños, for asking us to see Jose Lynn at the [facility].      Assessment/Recommendations   Assessment:    1.  Coronary artery disease: History of PCI of LAD, OM with known moderate to severe distal RCA disease stable compared to prior last angiogram.  No anginal complaints.  Continue a daily aspirin.  Regular exercise and heart healthy diet.  2.    Atrial fibrillation: 1 isolated episode of atrial fibrillation.  Continue on aspirin.  We will continue propafenone for now but consider stopping given history of coronary artery disease.  3.  Dyslipidemia: High-dose statin therapy.  Lipids well controlled.  Follow-up in 1 year     History of Present Illness    Mr. Jose Lynn is a 63 y.o. male history of coronary artery disease status post PCI of LAD in 2010, PCI of OM 2012 who I am seeing today in follow-up.  I have not seen him for over a year now.  He was seen by my colleagues.  He underwent a stress test that was abnormal and then underwent a coronary angiogram that showed unchanged moderate severe distal RCA disease compared to 2012, moderate distal LAD disease and patent LAD and OM stents with normal left ventricular ejection fraction.  He was also recently admitted with an episode of atrial fibrillation was placed on propafenone by my partner.  He has noted any further episodes in this with his isolated episode of atrial fibrillation.  He is very active.  He jogs and bikes 20-30 minutes daily.  Denies any chest pain, breathing difficulty or palpitations.    Coronary angiogram 6/22/2018    Asymptomatic patient with good exercise  tolerance by DANII protocol (13+ minutes) but with distal anterior and apical ischemia and abnormal EKG.    Widely patent LAD and OM stents. Moderate, unchanged distal LAD disease and unchanged moderate-severe distal RCA into PL disease (not flow limiting in 2012).    LV EDP and LV EF normal.    Estimated blood loss was <20 ml.       Physical Examination Review of Systems   Vitals:    04/18/19 1638   BP: 126/76   Pulse: (!) 58   SpO2: 98%     Body mass index is 27.42 kg/m .  Wt Readings from Last 3 Encounters:   04/18/19 188 lb 6.4 oz (85.5 kg)   03/20/19 187 lb (84.8 kg)   02/15/19 190 lb (86.2 kg)       General Appearance:   alert, no apparent distress   HEENT:  no scleral icterus; the mucous membranes are pink and moist                                  Neck: jugular venous pressure normal   Chest: the spine is straight and the chest is symmetric   Lungs:   respirations unlabored; the lungs are clear to auscultation   Cardiovascular:   regular rhythm with normal first and second heart sounds and no murmurs or gallops;  there are no carotid  bruits.   Abdomen:  no organomegaly, masses, bruits, or tenderness; bowel sounds are present   Extremities: no cyanosis, clubbing, or edema   Skin: no xanthelasma                                              Medical History  Surgical History Family History Social History   Past Medical History:   Diagnosis Date   ? Cancer (H)     Kidney   ? Coronary artery disease     Past Surgical History:   Procedure Laterality Date   ? APPENDECTOMY     ? CORONARY ANGIOPLASTY WITH STENT PLACEMENT  2010   ? CORONARY ANGIOPLASTY WITH STENT PLACEMENT  2012   ? CV CORONARY ANGIOGRAM N/A 6/22/2018    Procedure: Coronary Angiogram;  Surgeon: Shanique Bourgeois MD;  Location: [facility];  Service:    ? CV LEFT HEART CATHETERIZATION WITH LEFT VENTRICULOGRAM N/A 6/22/2018    Procedure: Left Heart Catheterization with Left Ventriculogram;  Surgeon: Shanique Bourgeois MD;  Location: [facility]  Lab;  Service:    ? FOOT FRACTURE SURGERY Right 02/15/2010   ? KNEE ARTHROSCOPY W/ MENISCECTOMY Left 07/01/2016   ? NEPHRECTOMY Left     Family History   Problem Relation Age of Onset   ? Hypertension Father    ? Heart attack Father    ? No Medical Problems Mother    ? No Medical Problems Brother    ? Hypertension Sister     Social History     Socioeconomic History   ? Marital status:      Spouse name: Not on file   ? Number of children: Not on file   ? Years of education: Not on file   ? Highest education level: Not on file   Occupational History   ? Not on file   Social Needs   ? Financial resource strain: Not on file   ? Food insecurity:     Worry: Not on file     Inability: Not on file   ? Transportation needs:     Medical: Not on file     Non-medical: Not on file   Tobacco Use   ? Smoking status: Never Smoker   ? Smokeless tobacco: Never Used   Substance and Sexual Activity   ? Alcohol use: No   ? Drug use: No   ? Sexual activity: Yes     Partners: Female   Lifestyle   ? Physical activity:     Days per week: Not on file     Minutes per session: Not on file   ? Stress: Not on file   Relationships   ? Social connections:     Talks on phone: Not on file     Gets together: Not on file     Attends Baptism service: Not on file     Active member of club or organization: Not on file     Attends meetings of clubs or organizations: Not on file     Relationship status: Not on file   ? Intimate partner violence:     Fear of current or ex partner: Not on file     Emotionally abused: Not on file     Physically abused: Not on file     Forced sexual activity: Not on file   Other Topics Concern   ? Not on file   Social History Narrative   ? Not on file          Medications  Allergies   Current Outpatient Medications   Medication Sig Dispense Refill   ? aspirin 81 MG EC tablet Take 162 mg by mouth daily.      ? atorvastatin (LIPITOR) 80 MG tablet TAKE ONE TABLET BY MOUTH AT BEDTIME  30 tablet 3   ? cholecalciferol,  vitamin D3, 1,000 unit tablet Take 1,000 Units by mouth daily.     ? cyclobenzaprine (FLEXERIL) 10 MG tablet Take 0.5-1 tablets (5-10 mg total) by mouth 3 (three) times a day as needed for muscle spasms. 30 tablet 1   ? lidocaine (LIDODERM) 5 % Remove & Discard patch within 12 hours or as directed by MD 15 patch 0   ? multivitamin therapeutic tablet Take 1 tablet by mouth daily.     ? naproxen (NAPROSYN) 500 MG tablet Take 1 tablet (500 mg total) by mouth 2 (two) times a day with meals. 30 tablet 0   ? omega-3/dha/epa/fish oil (FISH OIL-OMEGA-3 FATTY ACIDS) 300-1,000 mg capsule Take 2 g by mouth daily.     ? polyethylene glycol (MIRALAX) 17 gram packet Take 17 g by mouth daily.     ? propafenone (RYTHMOL SR) 225 MG 12 hr capsule Take 1 capsule (225 mg total) by mouth 2 (two) times a day. 180 capsule 2   ? sildenafil, antihypertensive, (REVATIO) 20 mg tablet Take 1 tablet (20 mg total) by mouth 3 (three) times a day. 90 tablet 1     No current facility-administered medications for this visit.       No Known Allergies      Lab Results    Chemistry/lipid CBC Cardiac Enzymes/BNP/TSH/INR   Lab Results   Component Value Date    CHOL 128 04/16/2018    HDL 45 04/16/2018    LDLCALC 73 04/16/2018    TRIG 49 04/16/2018    CREATININE 0.87 02/15/2019    BUN 17 02/15/2019    K 4.4 02/15/2019     02/15/2019     02/15/2019    CO2 28 02/15/2019    Lab Results   Component Value Date    WBC 4.5 11/25/2018    HGB 12.5 (L) 11/25/2018    HCT 36.3 (L) 11/25/2018    MCV 89 11/25/2018     11/25/2018    Lab Results   Component Value Date    TROPONINI 0.01 11/25/2018    BNP 41 11/25/2018    TSH 2.15 11/25/2018    INR 0.96 11/25/2018

## 2021-06-28 NOTE — PROGRESS NOTES
Progress Notes by Laverne Jiang MD at 12/8/2019 10:20 AM     Author: Laverne Jiang MD Service: -- Author Type: Physician    Filed: 12/8/2019 12:00 PM Encounter Date: 12/8/2019 Status: Signed    : Laverne Jiang MD (Physician)         Subjective:   Jose Lynn is a 64 y.o. male  Roomed by: Jody    Accompanied by Spouse Morena     Chief Complaint   Patient presents with   ? Conjunctivitis     right eye pink, watery   Started having runny nose, nasal congestion and sore throat about 2 weeks ago. Right eye started getting watery and red yesterday. Denies any blurred vision. Admits eye is a little itchy. Denies any CP, shortness of breath or coughing. Appetite has been normal. Admits being able to drink fluids and urinate normal amounts. Denies any recent nausea, vomiting, belly pain, or diarrhea. Energy level has been normal.     Review of Systems  See HPI for ROS, otherwise balance of other systems negative    No Known Allergies    Current Outpatient Medications:   ?  aspirin 81 MG EC tablet, Take 162 mg by mouth daily. , Disp: , Rfl:   ?  atorvastatin (LIPITOR) 80 MG tablet, TAKE ONE TABLET BY MOUTH NIGHTLY AT BEDTIME, Disp: 90 tablet, Rfl: 2  ?  cholecalciferol, vitamin D3, 1,000 unit tablet, Take 1,000 Units by mouth daily., Disp: , Rfl:   ?  diazePAM (VALIUM) 5 MG tablet, Take 1 tablet (5 mg total) by mouth every 6 (six) hours as needed for muscle spasms., Disp: 10 tablet, Rfl: 0  ?  multivitamin therapeutic tablet, Take 1 tablet by mouth daily., Disp: , Rfl:   ?  omega-3/dha/epa/fish oil (FISH OIL-OMEGA-3 FATTY ACIDS) 300-1,000 mg capsule, Take 2 g by mouth daily., Disp: , Rfl:   ?  omeprazole (PRILOSEC) 20 MG capsule, Take 1 capsule (20 mg total) by mouth daily before breakfast., Disp: 30 capsule, Rfl: 0  ?  oxyCODONE (ROXICODONE) 5 MG immediate release tablet, Take 1-2 tablets (5-10 mg total) by mouth every 4 (four) hours as needed for pain., Disp: 12 tablet, Rfl: 0  ?  polyethylene glycol  (MIRALAX) 17 gram packet, Take 17 g by mouth daily., Disp: , Rfl:   ?  ranitidine (ZANTAC) 150 MG capsule, Take 1 capsule (150 mg total) by mouth daily., Disp: 30 capsule, Rfl: 0  ?  sildenafil, antihypertensive, (REVATIO) 20 mg tablet, Take 1 tablet (20 mg total) by mouth 3 (three) times a day. (Patient taking differently: Take 20 mg by mouth as needed.    ), Disp: 90 tablet, Rfl: 1  Patient Active Problem List   Diagnosis   ? Dyslipidemia, goal LDL below 70   ? Coronary Artery Disease   ? Kidney Cancer   ? Basal Cell Carcinoma Of The Skin   ? Abnormal stress echocardiogram   ? Coronary artery disease involving native coronary artery of native heart with other form of angina pectoris (H)   ? Palpitations   ? History of coronary artery disease   ? Dyslipidemia   ? Paroxysmal atrial fibrillation (H)     Past Medical History:   Diagnosis Date   ? Cancer (H)     Kidney   ? Coronary artery disease     - if none on file, see Problem List    Objective:     Vitals:    12/08/19 1022   BP: 122/76   Patient Site: Right Arm   Patient Position: Sitting   Cuff Size: Adult Regular   Pulse: 71   Resp: 14   Temp: 98.2  F (36.8  C)   SpO2: 98%   Weight: 190 lb (86.2 kg)      Visual Acuity Screening    Right eye Left eye Both eyes   Without correction:      With correction: 20/50 20/50 20/50     Vision noted   Gen - Pt in NAD  Eyes - PERRL, EOMI  Right conjunctiva - bulba - mildly injected, tarsal - mildly injected - no drainage  Left conjunctiva - bulbar - non injected, tarsal  - non injected - no drainage  No photophobia  Face - non TTP over frontal sinus areas; non TTP over maxillary areas  Ears - external canals - no induration, Right TM - not injected, Left TM - not injected   Nose - not congested, no nasal drainage  Pharynx - not injected, tonsils 1+ size  Neck - supple, no cervical adenopathy, no masses  Cor - RRR w/o murmur  Lungs - Good air entry; no wheezes or crackles noted on auscultation - no coughing noted  Skin - no  lesions, no rashes noted       Assessment - Plan   Medical Decision Making - Patient presents with about 2 weeks of persistent cold symptoms.   Because the duration of patient's symptoms was 10 days or greater, criteria for acute sinusitis was made.  Will treat patient with augmentin.  He also has evidence of viral conjunctivitis.  See Patient instructions for symptomatic management and follow-up.    1. Acute non-recurrent sinusitis, unspecified location  - amoxicillin-clavulanate (AUGMENTIN) 875-125 mg per tablet; Take 1 tablet by mouth every 12 (twelve) hours.  Dispense: 14 tablet; Refill: 0    2. Viral conjunctivitis  - artificial tears,hypromellose, (PURE & GENTLE) 0.3 % Drop ophthalmic drops; 1 drop to affected eye every 4 hours while awake for 2 days after the mattering stops or total of 5 days  Dispense: 1 Bottle; Refill: 0      At the conclusion of the encounter, assessment and plan were discussed.   All questions were answered.   The patient or guardian acknowledged understanding and was involved in the decision making regarding the overall care plan.    Patient Instructions     1. Keep well hydrated  2. May alternate Tylenol every 6 hours with ibuprofen every 6 hours as needed for fever or pain  3. If follow up is needed, try and be seen at your primary clinic. Or you can be seen by any primary care provider at one of our other [facility] sites  4. If you have any questions, call the clinic number  - it's answered 24/7    Patient Education     Acute Bacterial Rhinosinusitis (ABRS)    Acute bacterial rhinosinusitis (ABRS) is an infection of your nasal cavity and sinuses. Its caused by bacteria. Acute means that youve had symptoms for less than 4 weeks, but possibly up to 12 weeks.  Understanding your sinuses  The nasal cavity is the large air-filled space behind your nose. The sinuses are a group of spaces formed by the bones of your face. They connect with your nasal cavity. ABRS causes the tissue lining  these spaces to become inflamed. Mucus may not drain normally. This leads to facial pain and other symptoms.  What causes ABRS?  ABRS most often follows an upper respiratory infection caused by a virus. Bacteria then infect the lining of your nasal cavity and sinuses. But you can also get ABRS if you have:    Nasal allergies    Long-term nasal swelling and congestion not caused by allergies    Blockage in the nose  Symptoms of ABRS  The symptoms of ABRS may be different for each person and include:    Nasal congestion or blockage    Pain or pressure in the face    Thick, colored drainage from the nose  Other symptoms may include:    Runny nose    Fluid draining from the nose down the throat (postnasal drip)    Headache    Cough    Pain    Fever  Diagnosing ABRS  ABRS may be diagnosed if youve had an upper respiratory infection like a cold and cough for 10 or more days without improvement or with worsening symptoms. Your healthcare provider will ask about your symptoms and your medical history. The provider will check your vital signs, including your temperature. Youll have a physical exam. The healthcare provider will check your ears, nose, and throat. You likely wont need any tests. If ABRS comes back, you may have a culture or other tests.  Treatment for ABRS  Treatment may include:    Antibiotic medicine. This is for symptoms that last for at least 10 to 14 days.    Nasal corticosteroid medicine. Drops or spray used in the nose can lessen swelling and congestion.    Over-the-counter pain medicine. This is to lessen sinus pain and pressure.    Nasal decongestant medicine. Spray or drops may help to lessen congestion. Do not use them for more than a few days.    Salt wash (saline irrigation). This can help to loosen mucus.  Possible complications of ABRS  ABRS may come back or become long-term (chronic). In rare cases, ABRS may cause complications such as:     Inflamed tissue around the brain and spinal cord  (meningitis)    Inflamed tissue around the eyes (orbital cellulitis)    Inflamed bones around the sinuses (osteitis)  These problems may need to be treated in a hospital with intravenous (IV) antibiotic medicine or surgery.  When to call the healthcare provider  Call your healthcare provider if you have any of the following:    Symptoms that dont get better, or get worse    Symptoms that dont get better after 3 to 5 days on antibiotics    Trouble seeing    Swelling around your eyes    Confusion or trouble staying awake   Date Last Reviewed: 5/1/2017 2000-2019 [employer]. [address] [place] [address]. All rights reserved. This information is not intended as a substitute for professional medical care. Always follow your healthcare professional's instructions.       Patient Education     Viral Conjunctivitis    Viral conjunctivitis (sometimes called pink eye) is a common infection of the eye. It is very contagious. Touching the infected eye, then touching another person passes this infection. It can also be spread from one eye to the other in this same way. The most common symptoms include redness, discharge from the eye, swollen eyelids, and a gritty or scratchy feeling in the eye.  This condition will take about 7 to 10 days to go away. Artificial tears (available without a prescription) are often recommended to moisten and clean the eyes. Antibiotic eye drops often are not recommended because they will not kill the virus. But sometimes they may be prescribed by eye doctors. This is to prevent a second, bacterial infection.  Home care    Apply a towel soaked in cool water to the affected eye 3 to 4 times a day (just before applying medicine to the eye).    It is common to have mucus drainage during the night, causing the eyelids to become crusted by morning. Use a warm, wet cloth to wipe this away.    Wash any cloths used to clean the eye after one use. Don't reuse them.    If  antibiotic medicines are prescribed, take them exactly as directed. Don't stop taking them until you are told to.    You may use acetaminophen or ibuprofen to control pain, unless another medicine was prescribed. (Note: If you have chronic liver or kidney disease, or if you have ever had a stomach ulcer or gastrointestinal bleeding, talk with your healthcare provider before using these medicines.) Aspirin should never be used in anyone under 18 years of age who is ill with a fever. It may cause severe liver damage.    Wash your hands before and after touching the affected eye. This helps to prevent spreading the infection to your other eye and to others.    The infected person should avoid sharing towels, washcloths, and bedding with others. This is to prevent spreading the infection.    This illness is contagious during the first week. Children with this illness should be kept out of day care and school until the redness clears.  Follow-up care  Follow up with your healthcare provider, or as advised.  When to seek medical advice  Call your healthcare provider right away if any of these occur:    Worsening vision    Increasing pain in the eye    Increasing swelling or redness of the eyelid    Redness spreading to the face around the eye    Large amount of green or yellow drainage from the eye    Severe itching in or around the eye    Fever of 100.4 F (38 C) or higher  Date Last Reviewed: 7/1/2017 2000-2017 [employer]. [address] [place] [address], [place] [address]. All rights reserved. This information is not intended as a substitute for professional medical care. Always follow your healthcare professional's instructions.

## 2021-06-29 NOTE — PROGRESS NOTES
Progress Notes by Janette Johnson MD at 8/10/2020  7:50 AM     Author: Janette Johnson MD Service: -- Author Type: Physician    Filed: 8/10/2020 10:07 AM Encounter Date: 8/10/2020 Status: Signed    : Janette Johnson MD (Physician)           Thank you, Dr. Bolaños, for asking us to see Jose Lynn at the [facility].      Assessment/Recommendations   Assessment:    1.  Coronary artery disease: History of PCI of LAD, OM with known moderate distal LAD and moderate to severe distal RCA disease stable compared to prior last angiogram.  Patient is complaining of new exertional angina occurring over the previous couple months.  He notices this mainly with biking particularly uphills.  Pain resolves with rest.  Given his known coronary artery disease and typical anginal pain will proceed directly with coronary angiography.  Discussed this with him and his wife and they are in agreement with this.  They understand the risks and benefits of the procedure.  2.  Atrial fibrillation: 1 isolated episode of atrial fibrillation.  Continue on aspirin.    3.  Dyslipidemia: High-dose statin therapy.  Lipids well controlled.  He is due for repeat fasting lipids this year.    Plan:  1.  Coronary angiogram with possible intervention  2.  Continue aspirin and statin therapy  3.  Follow-up with me post intervention       History of Present Illness    Mr. Jose Lynn is a 64 y.o. male history of coronary artery disease status post PCI of LAD in 2010, PCI of OM 2012 with known moderate to severe disease in his RCA and moderate disease in his distal LAD on last angiogram 2018.  Patient is very active.  Dany bikes regularly.  For the past few months he has noted exertional chest pain.  Particularly when he rides his bike up hills or goes for long bike rides.  Pain will always resolved with rest.  The pain feels like a pressure sensation.  He had an EKG done on 7/28/2020 that  showed normal sinus rhythm with no significant ST-T abnormalities.  Troponin was negative.      Coronary angiogram 6/21/2018    Asymptomatic patient with good exercise tolerance by DANII protocol (13+ minutes) but with distal anterior and apical ischemia and abnormal EKG.    Widely patent LAD and OM stents. Moderate, unchanged distal LAD disease and unchanged moderate-severe distal RCA into PL disease (not flow limiting in 2012).    LV EDP and LV EF normal.    Estimated blood loss was <20 ml.     Physical Examination Review of Systems   Vitals:    08/10/20 0752   BP: 130/82   Pulse: (!) 52   Resp: 16     Body mass index is 26.88 kg/m .  Wt Readings from Last 3 Encounters:   08/10/20 186 lb (84.4 kg)   07/28/20 184 lb (83.5 kg)   07/09/20 184 lb 9.6 oz (83.7 kg)       General Appearance:   alert, no apparent distress   HEENT:  no scleral icterus; the mucous membranes are pink and moist                                  Neck:  No JVD   Chest: the spine is straight and the chest is symmetric   Lungs:   respirations unlabored; the lungs are clear to auscultation   Cardiovascular:   regular rhythm with normal first and second heart sounds and no murmurs or gallops   Abdomen:  no organomegaly, masses, bruits, or tenderness; bowel sounds are present   Extremities: no cyanosis, clubbing, or edema   Skin: no xanthelasma    General: WNL  Eyes: WNL  Ears/Nose/Throat: WNL  Lungs: WNL  Heart: Chest Pain  Stomach: WNL  Bladder: WNL  Muscle/Joints: WNL  Skin: WNL  Nervous System: WNL  Mental Health: WNL     Blood: WNL     Medical History  Surgical History Family History Social History   Past Medical History:   Diagnosis Date   ? Cancer (H)     Kidney   ? Coronary artery disease     Past Surgical History:   Procedure Laterality Date   ? APPENDECTOMY     ? CORONARY ANGIOPLASTY WITH STENT PLACEMENT  2010   ? CORONARY ANGIOPLASTY WITH STENT PLACEMENT  2012   ? CV CORONARY ANGIOGRAM N/A 6/22/2018    Procedure: Coronary Angiogram;   Surgeon: Shanique Bourgeois MD;  Location: [facility];  Service:    ? CV LEFT HEART CATHETERIZATION WITH LEFT VENTRICULOGRAM N/A 6/22/2018    Procedure: Left Heart Catheterization with Left Ventriculogram;  Surgeon: Shanique Bourgeois MD;  Location: [facility];  Service:    ? FOOT FRACTURE SURGERY Right 02/15/2010   ? KNEE ARTHROSCOPY W/ MENISCECTOMY Left 07/01/2016   ? NEPHRECTOMY Left     Family History   Problem Relation Age of Onset   ? Hypertension Father    ? Heart attack Father    ? No Medical Problems Mother    ? No Medical Problems Brother    ? Hypertension Sister     Social History     Socioeconomic History   ? Marital status:      Spouse name: Not on file   ? Number of children: Not on file   ? Years of education: Not on file   ? Highest education level: Not on file   Occupational History     Employer: [employer]   Social Needs   ? Financial resource strain: Not on file   ? Food insecurity     Worry: Not on file     Inability: Not on file   ? Transportation needs     Medical: Not on file     Non-medical: Not on file   Tobacco Use   ? Smoking status: Never Smoker   ? Smokeless tobacco: Never Used   Substance and Sexual Activity   ? Alcohol use: No   ? Drug use: No   ? Sexual activity: Yes     Partners: Female   Lifestyle   ? Physical activity     Days per week: 7 days     Minutes per session: 20 min   ? Stress: Not on file   Relationships   ? Social connections     Talks on phone: Not on file     Gets together: Not on file     Attends Anabaptist service: Not on file     Active member of club or organization: Not on file     Attends meetings of clubs or organizations: Not on file     Relationship status: Not on file   ? Intimate partner violence     Fear of current or ex partner: Not on file     Emotionally abused: Not on file     Physically abused: Not on file     Forced sexual activity: Not on file   Other Topics Concern   ? Not on file   Social History Narrative   ? Not on  file          Medications  Allergies   Current Outpatient Medications   Medication Sig Dispense Refill   ? aspirin 81 MG EC tablet Take 162 mg by mouth daily.      ? atorvastatin (LIPITOR) 80 MG tablet TAKE 1 TABLET BY MOUTH EVERYDAY AT BEDTIME 90 tablet 1   ? cholecalciferol, vitamin D3, 1,000 unit tablet Take 1,000 Units by mouth daily.     ? multivitamin therapeutic tablet Take 1 tablet by mouth daily.     ? nitroglycerin (NITROSTAT) 0.4 MG SL tablet Place 1 tablet (0.4 mg total) under the tongue every 5 (five) minutes as needed for chest pain. 1 Bottle 2   ? omega-3/dha/epa/fish oil (FISH OIL-OMEGA-3 FATTY ACIDS) 300-1,000 mg capsule Take 2 g by mouth daily.     ? polyethylene glycol (MIRALAX) 17 gram packet Take 17 g by mouth daily.     ? sildenafil (REVATIO) 20 mg tablet Take 4 tablets (equals 80 mg) 1 hour prior to activity, maximum 1 dose in 24 hours 40 tablet 1     No current facility-administered medications for this visit.       No Known Allergies      Lab Results    Chemistry/lipid CBC Cardiac Enzymes/BNP/TSH/INR   Lab Results   Component Value Date    CHOL 128 06/04/2019    HDL 52 06/04/2019    LDLCALC 66 06/04/2019    TRIG 49 06/04/2019    CREATININE 1.04 11/07/2019    BUN 19 11/07/2019    K 3.7 11/07/2019     11/07/2019     11/07/2019    CO2 27 11/07/2019    Lab Results   Component Value Date    WBC 7.5 11/07/2019    HGB 12.3 (L) 11/07/2019    HCT 36.2 (L) 11/07/2019    MCV 91 11/07/2019     11/07/2019    Lab Results   Component Value Date    TROPONINI <0.01 07/28/2020    BNP 41 11/25/2018    TSH 2.15 11/25/2018    INR 0.96 11/07/2019

## 2021-06-29 NOTE — PROGRESS NOTES
Progress Notes by Irina Guerrero CNP at 8/27/2020  7:50 AM     Author: Irina Guerrero CNP Service: -- Author Type: Nurse Practitioner    Filed: 8/27/2020  8:27 AM Encounter Date: 8/27/2020 Status: Signed    : Irina Guerrero CNP (Nurse Practitioner)             Assessment/Recommendations   1.  Coronary artery disease: He has a history of coronary artery disease with PCI to LAD in 2010 and OM in 2012.  Coronary angiogram on August 13, 2020 showed that LAD and OM stents were patent.  He had Cutting Balloon angioplasty to RPDA.  He is on dual antiplatelet therapy with aspirin and Brilinta for 12 months with recommendation to consider dual antiplatelet therapy indefinitely with aspirin and Plavix after 12 months.    Puncture site is soft with no hematoma.      Risk factor modification and lifestyle management topics were discussed including managing comorbidities, heart healthy diet and exercise.  He was encouraged to start a light exercise program and gradually increase.    2.  Dyslipidemia: Jose Lynn is on high intensity statin therapy with atorvastatin 80 mg daily.  We discussed a diet low in saturated fat, weight loss, and exercise along with medication for better control of cholesterol.     Follow-up with Dr. Johnson in October.       History of Present Illness/Subjective    Jose Lynn is seen at [facility] for post coronary intervention follow up.  He has a history of coronary artery disease with PCI to LAD in 2010 and OM in 2012, and dyslipidemia.  He developed exertional chest pain which led to coronary angiogram on August 13, 2020.  LAD and OM stents were patent.  He had Cutting Balloon angioplasty to RPDA.  He is on dual antiplatelet therapy with aspirin and Brilinta for 12 months with recommendation to consider dual antiplatelet therapy indefinitely with aspirin and Plavix after 12 months.      He denies any chest pain or shortness of breath since  PCI.  He has not returned to his normal amount of exercise.  He typically bikes daily.  He denies fatigue, lightheadedness and lower extremity edema.      Results for orders placed during the hospital encounter of 08/13/20   Cardiac Catheterization [CATH01] 08/13/2020    Addendum Jose Lynn is a 64 y.o. old male with CAD s/p PCI of LAD in 2010, PCI  of OM 2012 with known moderate to severe disease in his RCA and distal  LAD, AF, HL who is here for recurrence of angina.  - severe rPDA lesion treated w/ cutting balloon angioplasty due to  FFR-negative disease in diffusely moderately diseased rPLV/dRCA; LAD and  OM stents are patent - normal L-sided filling pressures - should he develop any re-narrowing, would likely need stents in both  PLV/PDA bifurcation - ASA 81mg daily indefinitely, ticagrelor 180mg once, followed by 90mg  twice daily for at least 12 months. Can switch for ASA/clopidogrel after  12 mos and continue indefinitely also, if tolerated - consider Nuc stress in 6 mos - continue atorva 80, low threshold to consider PCS K9 inhibitors - continue aggressive risk factor modification    Findings: LM:no obstruction LAD:patent proximal/mid-stent, mild diffuse disease distally Lcx:patent OM1 stent, mild disease in small AV Lcx RCA:dominant, diffuse 40% irregularity in proximal vessel and in rPLV, 99%  ostial narrowing in high bifurcating rPDA has progressed since prior  angio. PLV PD/PA is 1, FFR 0.94  LVEDP:13  Access: R Radial artery  PCI: RCA was engaged w/ a 6F JR4 Guide catheter and the lesion in PLV which had  a Comet wire in it for FFR was protected with it, while rPDA was wired w/  a Forte. Ostial rPDA was then dilated w/ a 2.0x8mmm then a 2.5x8mm Emerge  balloons, prior to being able to pass a 2.5x6mm Jefferson balloon, which  was used with good result to treat the lesion at 6 jennifer inflations. Final  angiography showed no dissection or perforation and a EVENS 3 flow.  Closure:  Vasc Band           Steve Neville MD 8/13/2020  2:21 PM          Narrative Jose Lynn is a 64 y.o. old male with CAD s/p PCI of LAD in 2010, PCI   of OM 2012 with known moderate to severe disease in his RCA and distal   LAD, AF, HL who is here for recurrence of angina.    - severe rPDA lesion treated w/ cutting balloon angioplasty due to   FFR-negative disease in diffusely moderately diseased rPLV/dRCA; LAD and   OM stents are patent  - normal L-sided filling pressures  - should he develop any re-narrowing, would likely need stents in both   PLV/PDA bifurcation  - ASA 81mg daily indefinitely, ticagrelor 180mg once, followed by 90mg   twice daily for at least 12 months. Can switch for ASA/clopidogrel after   12 mos and continue indefinitely also, if tolerated  - consider Nuc stress in 6 mos  - continue atorva 80, low threshold to consider PCS K9 inhibitors  - continue aggressive risk factor modification        Findings:  LM:no obstruction  LAD:patent proximal/mid-stent, mild diffuse disease distally  Lcx:patent OM1 stent, mild disease in small AV Lcx  RCA:dominant, diffuse 40% irregularity in proximal vessel and in rPLV, 99%   ostial narrowing in high bifurcating rPDA has progressed since prior   angio. PLV PD/PA is 1, FFR 0.94    LVEDP:13    Access:  R Radial artery    PCI:  RCA was engaged w/ a 6F JR4 Guide catheter and the lesion in PLV which had   a Comet wire in it for FFR was protected with it, while rPDA was wired w/   a Forte. Ostial rPDA was then dilated w/ a 2.0x8mmm then a 2.5x8mm Emerge   balloons, prior to being able to pass a 2.5x6mm [place] balloon, which   was used with good result to treat the lesion at 6 jennifer inflations. Final   angiography showed no dissection or perforation and a EVENS 3 flow.    Closure:   Vasc Band            Physical Examination Review of Systems   Vitals:    08/27/20 0751   BP: 122/80   Pulse: (!) 50   Resp: 14   SpO2: 98%     Body mass index is 26.54 kg/m .  Wt Readings from Last 3  Encounters:   08/27/20 185 lb (83.9 kg)   08/14/20 181 lb 8 oz (82.3 kg)   08/12/20 185 lb 8 oz (84.1 kg)       General Appearance:     Alert, cooperative and in no acute distress.   ENT/Mouth: membranes moist, no oral lesions or bleeding gums.      EYES:  no scleral icterus, normal conjunctivae   Chest/Lungs:   lungs are clear to auscultation, no rales or wheezing, respirations unlabored   Cardiovascular:   Regular, bradycardic. Normal first and second heart sounds, no edema bilateral lower extremities    Abdomen:  Soft, nontender, nondistended, bowel sounds present   Extremities: no cyanosis or clubbing   Skin:  Neurologic: warm, dry.  mood and affect are appropriate, alert and oriented x3     Puncture Site: Right radial site is soft with no bruising.  Radial pulses and Pedal pulses intact and symmetrical.  CMS intact.        General: WNL  Eyes: WNL  Ears/Nose/Throat: WNL  Lungs: WNL  Heart: WNL  Stomach: WNL  Bladder: WNL  Muscle/Joints: WNL  Skin: WNL  Nervous System: WNL  Mental Health: WNL     Blood: WNL     Medical History  Surgical History Family History Social History   Past Medical History:   Diagnosis Date   ? Cancer (H)     Kidney   ? Coronary artery disease     Past Surgical History:   Procedure Laterality Date   ? APPENDECTOMY     ? CORONARY ANGIOPLASTY WITH STENT PLACEMENT  2010   ? CORONARY ANGIOPLASTY WITH STENT PLACEMENT  2012   ? CV CORONARY ANGIOGRAM N/A 6/22/2018    Procedure: Coronary Angiogram;  Surgeon: Shanique Bourgeois MD;  Location: [facility];  Service:    ? CV CORONARY ANGIOGRAM N/A 8/13/2020    Procedure: Coronary Angiogram;  Surgeon: Steve Neville MD;  Location: [facility];  Service: Cardiology   ? CV LEFT HEART CATHETERIZATION WITH LEFT VENTRICULOGRAM N/A 6/22/2018    Procedure: Left Heart Catheterization with Left Ventriculogram;  Surgeon: Shanique Bourgeois MD;  Location: [facility];  Service:    ? FOOT FRACTURE SURGERY Right 02/15/2010   ? KNEE  ARTHROSCOPY W/ MENISCECTOMY Left 07/01/2016   ? NEPHRECTOMY Left     Family History   Problem Relation Age of Onset   ? Hypertension Father    ? Heart attack Father    ? No Medical Problems Mother    ? No Medical Problems Brother    ? Hypertension Sister     Social History     Socioeconomic History   ? Marital status:      Spouse name: Not on file   ? Number of children: Not on file   ? Years of education: Not on file   ? Highest education level: Not on file   Occupational History     Employer: [employer]   Social Needs   ? Financial resource strain: Not on file   ? Food insecurity     Worry: Not on file     Inability: Not on file   ? Transportation needs     Medical: Not on file     Non-medical: Not on file   Tobacco Use   ? Smoking status: Never Smoker   ? Smokeless tobacco: Never Used   Substance and Sexual Activity   ? Alcohol use: No   ? Drug use: No   ? Sexual activity: Yes     Partners: Female   Lifestyle   ? Physical activity     Days per week: 7 days     Minutes per session: 20 min   ? Stress: Not on file   Relationships   ? Social connections     Talks on phone: Not on file     Gets together: Not on file     Attends Restorationist service: Not on file     Active member of club or organization: Not on file     Attends meetings of clubs or organizations: Not on file     Relationship status: Not on file   ? Intimate partner violence     Fear of current or ex partner: Not on file     Emotionally abused: Not on file     Physically abused: Not on file     Forced sexual activity: Not on file   Other Topics Concern   ? Not on file   Social History Narrative   ? Not on file          Medications  Allergies   Current Outpatient Medications   Medication Sig Dispense Refill   ? aspirin 81 mg chewable tablet Chew 1 tablet (81 mg total) daily. Take aspirin indefinitely (for the rest of your life). 30 tablet 0   ? atorvastatin (LIPITOR) 80 MG tablet TAKE 1 TABLET BY MOUTH EVERYDAY AT BEDTIME 90 tablet 1   ?  cholecalciferol, vitamin D3, 1,000 unit tablet Take 1,000 Units by mouth daily.     ? multivitamin therapeutic tablet Take 1 tablet by mouth daily.     ? nitroglycerin (NITROSTAT) 0.4 MG SL tablet Place 1 tablet (0.4 mg total) under the tongue every 5 (five) minutes as needed for chest pain. 1 Bottle 2   ? omega-3/dha/epa/fish oil (FISH OIL-OMEGA-3 FATTY ACIDS) 300-1,000 mg capsule Take 2 g by mouth daily.     ? polyethylene glycol (MIRALAX) 17 gram packet Take 17 g by mouth daily.     ? sildenafil (REVATIO) 20 mg tablet Take 4 tablets (equals 80 mg) 1 hour prior to activity, maximum 1 dose in 24 hours 40 tablet 1   ? ticagrelor (BRILINTA) 90 mg Tab Take 1 tablet (90 mg total) by mouth 2 (two) times a day. For 12 months 60 tablet 11     No current facility-administered medications for this visit.     No Known Allergies      Lab Results    Chemistry/lipid CBC Cardiac Enzymes/BNP/TSH/INR   Lab Results   Component Value Date    CHOL 113 08/13/2020    HDL 43 08/13/2020    LDLCALC 58 08/13/2020    TRIG 59 08/13/2020    CREATININE 0.78 08/14/2020    BUN 13 08/14/2020    K 4.0 08/14/2020     08/14/2020     (H) 08/14/2020    CO2 27 08/14/2020    Lab Results   Component Value Date    WBC 3.6 (L) 08/13/2020    HGB 12.4 (L) 08/14/2020    HCT 35.8 (L) 08/13/2020    MCV 88 08/13/2020     08/13/2020    Lab Results   Component Value Date    TROPONINI <0.01 07/28/2020    BNP 41 11/25/2018    TSH 2.15 11/25/2018    INR 0.96 11/07/2019

## 2021-06-29 NOTE — PROGRESS NOTES
Progress Notes by Lottie López PA-C at 7/28/2020  7:10 AM     Author: Lottie López PA-C Service: -- Author Type: Physician Assistant    Filed: 7/28/2020  8:16 AM Encounter Date: 7/28/2020 Status: Signed    : Lottie López PA-C (Physician Assistant)         Assessment & Plan:       1. Acute chest pain  Electrocardiogram Perform and Read    Troponin I      Medical Decision Making  Patient presents for an episode of acute chest pain and shortness of breath that occurred yesterday while on his bicycle.  Symptoms sound most consistent with stable angina.  He has no chest pains at this time.  ECG shows normal sinus bradycardia unchanged from his previous ECG from November 2019.  Per recommendation from the cardiologist RN, recommend the patient continue to follow-up with their primary care doctor for possible prescription of nitroglycerin.  Patient has appointment for cardiologist already established.  There is an in process troponin lab, will contact patient with results.  Discussed signs of worsening symptoms and when to be seen immediately for reevaluation.  Provided patient with education materials.    Patient Instructions   Patient Education     Stable Angina  The chest discomfort you have appears to be coming from your heart--a condition called angina. Angina is a pain in the heart due to poor blood flow to the heart muscle. This can occur when plaque builds up on the artery wall or a blood clot forms in one or more of the small blood vessels that deliver oxygen to the heart muscle. Plaque is a fatty material made up of cholesterol, calcium deposits, and other materials.  Exercise, increased activity, emotional upset, or stress can trigger this pain. With proper treatment and lifestyle changes to reduce risk factors, most people with angina are able to maintain a full and active life.  Angina is not a heart attack. But if angina pain is severe or prolonged, it is a sign of an impending  heart attack, also called acute myocardial infarction, or AMI. Your angina is under control at this time. Therefore, it is safe for you to go home. Follow up as instructed by your doctor for any further tests and office visits.    Home care    Rest at home today and avoid any emotional or physically strenuous activity.    Take medicine (usually nitroglycerin) for chest pain exactly as prescribed. Keep your nitroglycerin with you at all times.    When taking nitroglycerin for angina, sit or lie down. The medicine may make you feel dizzy.  ? Place one tablet under your tongue, or between your lip and gum, or between your cheek and gum. Let the tablet dissolve completely; do not chew or swallow the tablet.  ? If you use a spray, then spray once on or under your tongue. Do not inhale. Right after you use the spray, close your mouth. Wait a few seconds before you swallow.  ? After taking one tablet or spraying once, continue sitting or lying for 5 minutes.  ? If the angina goes away completely, rest awhile and continue your normal routine.  Note: Your healthcare provider may give you slightly different instructions than those above. If so, follow them carefully.  Prevention    Learn how to take your own blood pressure. Keep a record of your results. Ask your doctor which readings mean that you need medical attention. Reduce salt intake and follow lifestyle change instructions if you have high blood pressure (hypertension).    Maintain a healthy weight. Get help to lose any extra pounds. Talk to your doctor about a safe diet program. Sudden large weight losses can be dangerous.    If you have diabetes, talk to your doctor about healthy control of your blood sugar.    Begin an exercise program. Ask your doctor how to get started. You can benefit from simple activities such as walking or gardening. Short, high intensity exercise sessions may also be beneficial.    Break the smoking habit. Enroll in a stop-smoking program to  "improve your chances of success.    Get adequate rest.    Stay away from stressful situations. Learn stress-management techniques.  Follow-up care  Follow up with your doctor, or as advised.  If an X-ray was done , it will be reviewed by another specialist. You will be notified of any new findings that may affect your care.  Call 911  This is the fastest and safest way to get to the nearest emergency department. The paramedics can also start treatment on the way to the hospital, saving valuable time for your heart.    If angina gets worse, it continues, or if it stops and returns, call 911 right away. Don't delay. You may be having a heart attack.    After you call 911, take a second nitroglycerine tablet or spray unless instructed otherwise. When repeating doses, sit down if possible because it can make you feel lightheaded or dizzy. Wait another 5 minutes. If the angina still does not go away, take a third tablet or spray. Don't take more than 3 tablets or sprays within 15 minutes. Stay on the phone with 911 for more instructions.    Your healthcare provider may give you slightly different instructions than those above. If so, follow them carefully.  Don't wait until your symptoms are severe to call 911. Other reasons to call 911 besides chest pain include:    Trouble breathing    Feeling lightheaded, faint, or dizzy    Rapid heart beat    Slower than usual heart rate compared to your normal    Angina with weakness, dizziness, fainting, heavy sweating, nausea, or vomiting    Extreme drowsiness, or confusion    Weakness of an arm or leg or on one side of the face    Difficulty with speech or vision  When to seek medical advice  Remember, the signs and symptoms of a heart attack are not always like they are on TV. Sometimes they are not so obvious. You may only feel weak or just \"not right.\" If it is not clear or if you have any doubt, call for advice.    Seek help if there is a change in the type of pain, if it " feels different, or if your symptoms are mild.    Don't drive yourself. Have someone else drive. If no one can drive you, call 911.    If your doctor has given you medicine to take when you have symptoms, take them, but do not delay getting  help.    Don't delay. Fast diagnosis and treatment can prevent or  limit the amount of heart damage during a heart attack or stroke.    Don't go to your doctor's office or a clinic because they may not be able to provide all the testing and treatment you need.  Date Last Reviewed: 12/30/2015 2000-2017 [employer] [address] [place] [address], [place] [address]. All rights reserved. This information is not intended as a substitute for professional medical care. Always follow your healthcare professional's instructions.                 Subjective:       Jose Lynn is a 64 y.o. male here for evaluation of acute onset chest pains.  Patient notes that he was biking up a hill yesterday when he developed some mild chest pains and shortness of breath.  After the patient finished with the hill he noted complete resolution of the chest pain symptoms within seconds.  He has no chest pain or shortness of breath at this time.  He has had similar episodes in the past.  Patient does see a cardiologist.  Patient reached out to their office yesterday patient was told to follow-up with his PCP for possible prescription of nitroglycerin and to [facility] for immediate evaluation of cardiac symptoms.  Patient otherwise denies fevers and cough.    The following portions of the patient's history were reviewed and updated as appropriate: allergies, current medications and problem list.    Review of Systems  Pertinent items are noted in HPI.     Allergies  No Known Allergies    Family History   Problem Relation Age of Onset   ? Hypertension Father    ? Heart attack Father    ? No Medical Problems Mother    ? No Medical Problems Brother    ? Hypertension Sister         Social History     Socioeconomic History   ? Marital status:      Spouse name: None   ? Number of children: None   ? Years of education: None   ? Highest education level: None   Occupational History     Employer: [employer]   Social Needs   ? Financial resource strain: None   ? Food insecurity     Worry: None     Inability: None   ? Transportation needs     Medical: None     Non-medical: None   Tobacco Use   ? Smoking status: Never Smoker   ? Smokeless tobacco: Never Used   Substance and Sexual Activity   ? Alcohol use: No   ? Drug use: No   ? Sexual activity: Yes     Partners: Female   Lifestyle   ? Physical activity     Days per week: 7 days     Minutes per session: 20 min   ? Stress: None   Relationships   ? Social connections     Talks on phone: None     Gets together: None     Attends Druze service: None     Active member of club or organization: None     Attends meetings of clubs or organizations: None     Relationship status: None   ? Intimate partner violence     Fear of current or ex partner: None     Emotionally abused: None     Physically abused: None     Forced sexual activity: None   Other Topics Concern   ? None   Social History Narrative   ? None         Objective:       /83 Comment: went biking before going to clinic  Pulse (!) 52   Temp 97.9  F (36.6  C)   Resp 16   Wt 184 lb (83.5 kg)   SpO2 98%   BMI 26.59 kg/m    General appearance: alert, appears stated age, cooperative, no distress and non-toxic  Lungs: clear to auscultation bilaterally  Heart: bradycardia, normal S1 and S2, no murmurs, rubs, or gallops     Lab Results    Recent Results (from the past 24 hour(s))   Electrocardiogram Perform and Read   Result Value Ref Range    SYSTOLIC BLOOD PRESSURE      DIASTOLIC BLOOD PRESSURE      VENTRICULAR RATE 48 BPM    ATRIAL RATE 48 BPM    P-R INTERVAL 196 ms    QRS DURATION 88 ms    Q-T INTERVAL 458 ms    QTC CALCULATION (BEZET) 409 ms    P Axis 53 degrees    R  AXIS 54 degrees    T AXIS 52 degrees    MUSE DIAGNOSIS       Sinus bradycardia  Otherwise normal ECG  When compared with ECG of 07-NOV-2019 22:50,  No significant change was found       I personally reviewed these results and discussed findings with the patient.

## 2021-06-30 NOTE — PROGRESS NOTES
Progress Notes by Marko Zheng at 2/4/2021  8:00 AM     Author: Marko Zheng Service: -- Author Type: Patient Access    Filed: 2/8/2021  8:37 AM Encounter Date: 2/4/2021 Status: Signed    : Alfonso Dukes MD (Physician)    Related Notes: Original Note by Marko Zheng (Patient Access) filed at 2/4/2021  9:54 AM         Parrot Study Inclusion / Exclusion Criteria    Study Purpose: Atrial Fibrillation Algorithm Clinical Validation Study, prospective, multi-center, non significant risk     Protocol Version V 3.0 - 10 December 2020    Subject Cohort: 3    Inclusion Criteria-all must be Yes  Yes/No Cohort Subject must meet all inclusion criteria:    Yes   All 1. Able to read, understand, and provide written informed consent.   Yes All 2. Willing and able to participate in the study procedures as described in the consent form.    Yes All 3. Be 22 years of age and older   Yes    All 4. Able to communicate effectively with and follow instructions from study staff   Yes All 5. Able to wear the wrist device for duration of study participation     NA Cohort 1 6. Have no known medical history of AF    N/A Cohort 2 7. have no known medical history of AF and active diagnosis of at least one of the following arrhythmias within the past 2 years:   A. Frequent PACs, defined as at least 1% of total beats of atrial ectopic beats by 24-48 hour Holter, ambulatory ECG monitor, or implantable loop recorder)   B. Frequent PVCs, defined as at least 1% of total beats of ventricular ectopic beats by 24-48 hour Holter, ambulatory ECG monitor, or implantable loop recorder   C. SVT, which will include atrial tachycardia, atrioventricular mireya re-entrant tachycardia, atrioventricular re-entrant tachycardia by 12-lead ECG or 24-48 hour Holter, ambulatory ECG monitor, or implantable loop recorder   D. NSVT, defined as three or more consecutive ventricular beats at a rate of at least 100 beats per minute and lasting no more than 30 seconds, by  12-lead ECG or 24-48 hour Holter, ambulatory ECG monitor, or implantable loop recorder     Yes Cohort 3 and 4 8. have a known diagnosis of AF at the time of screening (confirmed by electronic medical record (EMR) or self-report) and have had a recent episode of AF, or confirmed AF on ECG, in the past 12 months     NA Cohort 4 9. have a known diagnosis of permanent AF at the time of screening (confirmed by EMR or self-report) and have had a recent episode of AF, or confirmed AF on ECG, in the past 12 months    Yes NA 10. Meet additional binning based on demographics             Exclusion Criteria-all must be no  Yes/No Criteria # Subject must not meet any exclusion criteria:    No All 1. Physical disability that prevents safe and adequate testing.   No All 2. Mental impairment resulting in limited ability to cooperate.   No All 3. Known uncontrolled medical conditions, Such as (but not limited to) significant anemia, important electrolyte imbalance and untreated or uncontrolled thyroid disease   No All 4. Open wound(s) on the wrist and / or forearm   No All 5. Tattoos, large moles, or scars on the wrist at the wrist device location    No All 6. Skin conditions on either wrist that would preclude subject from wearing a wristband on either wrist    No All 7. Known allergy or sensitivity to medical adhesives, isopropyl alcohol, wristbands, or ECG patch.   No All 8. Medical history or physical assessment finding that makes the subjects inappropriate for participation according to investigator(s)   No All 9. Participation in a previous study that used a wrist-worn sensor device with a simultaneous ECG reference patch    No All 10. Implantable cardiac devices such as a Pacemaker or Implantable Cardioverter defibrillator    No All 11. Clinically significant hand tremors, as judged by the investigator    No All 12. Acute illness including COVID and other respiratory illnesses    No Cohort 3 and 4 13. Subject with known  history of AF on a rhythm control medications with history of complete AF rhythm control (I.e history of zero AF burden) will be excluded from Cohort 3 and 4.  (Subjects enrolled into Cohort 2 can be on rate control medications)      Subject has met all inclusion criteria and no exclusion criteria have been met.     Subject is ready to fully enrolled in the Parrot study.    Marko Zheng

## 2021-06-30 NOTE — PROGRESS NOTES
Progress Notes by Marko Zheng at 2/8/2021  9:00 AM     Author: Marko Zheng Service: -- Author Type: Patient Access    Filed: 2/8/2021 10:46 AM Encounter Date: 2/8/2021 Status: Signed    : Marko Zheng (Patient Access)       Parrot Study - Day 1 Call     Putting Devices On    Study Purpose:   Atrial Fibrillation Algorithm Clinical Validation Study, prospective, multi-center, non significant risk     Subject was called on 8Feb2021 for their Day 1 phone call to instructed subjects on putting on device.     Did the subject have a new reportable adverse events since last visit: No  If yes, please generate adverse event report for PI to cosign    Plan: Subject is schedule for a Day 5 phone call on 12Feb2021 date & time.     Marko Zheng

## 2021-06-30 NOTE — PROGRESS NOTES
Progress Notes by Lisa Saenz CNP at 2/4/2021  8:00 AM     Author: Lisa Saenz CNP Service: -- Author Type: Nurse Practitioner    Filed: 2/4/2021  9:54 AM Encounter Date: 2/4/2021 Status: Signed    : Lisa Saenz CNP (Nurse Practitioner)           Study Purpose: Atrial Fibrillation Algorithm Clinical Validation Study, prospective, multi-center, non significant risk       Physical Examination performed via live video encounter   General Appearance:   Alert, well appearing,no distress, normal body habitus, upright.   HENT/Mouth: Atraumatic normocephalic membranes moist, mucous membranes pink and moist  No nasal discharge or   bleeding gums.    EYES:  no scleral icterus, normal conjunctivae   Neck: no evidence of thyromegaly.  Supple.    Chest/Lungs:   No audible wheezing equal chest wall expansion. Non labored breathing.  No cough.   Cardiovascular:   No evidence of elevated jugular venous pressure.      Abdomen:  no evidence of abdominal distention. No observe juandice.     Extremities: no cyanosis or clubbing noted.   No visible edema bilaterally   Skin:   skin dry, no visible ecchymosis  No markings of the bilateral wrists.   Neurologic: Mood affect appropriate to place time and person   Normal arm motion bilateral, no tremors.  No evidence of focal defect.              COVID: No symptoms, chills, shortness of breath, or difficulty breathing, muscle or body aches, headache, loss of taste or smell, sore throat, runny nose, congestion, nausea, vomiting or diarrhea.according to the [place] Department of Health and Human Services based on the CARES Act.       Lisa Saenz CNP

## 2021-06-30 NOTE — PROGRESS NOTES
Progress Notes by Lonnie Reid at 2/12/2021  9:00 AM     Author: Lonnie Reid Service: -- Author Type: Patient Access    Filed: 2/12/2021 11:53 AM Encounter Date: 2/12/2021 Status: Signed    : Lonnie Reid (Patient Access)       Parrot Study - Day 5 Call      Removing Patch 1    Study Purpose:   Atrial Fibrillation Algorithm Clinical Validation Study, prospective, multi-center, non significant risk     Subject was called on 2/12/2021 for their Day 5 phone call. Subject had an angiogram on 2/11/2021 and patch #1 was removed at that time. The subject later called the hotline and they had him place patch #2 on his chest. After discussing with Annabelle Ocasio and the study sponsor it was determined that patch #2 would be worn from 2/11/2021 at 4:15PM to Day 13 on 2/22/2021 at 8AM.      Did the subject have a new reportable adverse events since last visit: No  If yes, please generate adverse event report for PI to cosign    Plan: Subject is schedule for a Day 13 phone call on 2/22/2021 at 8AM date & time.     Lonnie Reid

## 2021-06-30 NOTE — PROGRESS NOTES
Progress Notes by Janette Johnson MD at 12/2/2020  7:50 AM     Author: Janette Johnson MD Service: -- Author Type: Physician    Filed: 12/2/2020  8:33 AM Encounter Date: 12/2/2020 Status: Signed    : Janette Johnson MD (Physician)           Thank you, Dr. Bolaños, for asking us to see Jose Lynn at the [facility].      Assessment/Recommendations   Assessment:    1.  Coronary artery disease: History of PCI of LAD, OM with known moderate distal LAD and moderate to severe distal RCA disease.  Patient was experiencing exertional angina and underwent angiogram on 8/13 with severe RPDA disease treated with cutting angioplasty.  Residual disease RPL/D RCA.  2.  Atrial fibrillation: 1 isolated episode of atrial fibrillation.  Continue on aspirin.    3.  Dyslipidemia: High-dose statin therapy.  Lipids well controlled.   Plan:  1.  Continue aspirin 81 mg daily indefinitely.  Continue Brilinta 90 mg twice daily for 12 months followed by Plavix indefinitely if tolerated.  2.  Continue Lipitor 80 mg daily.  Reviewed lipids and well controlled.  Consider PCSK9 inhibitor given extent of disease.  3.  We discussed improved diet with less red meat and carbs.  Continued exercise.  4.  Nuclear stress test 6 months following procedure is recommended he also needs stress testing for driving permit.  We will see him in follow-up in 6 months.       History of Present Illness    Mr. Jose Lynn is a 65 y.o. male history of coronary artery disease status post PCI of LAD in 2010, PCI of OM 2012 with known moderate to severe disease in his RCA and moderate disease in his distal LAD on last angiogram 2018.  He was experiencing exertional chest pain when he was going on long bike rides and up hills.  Angiogram demonstrated severe RPDA disease and he underwent cutting balloon angioplasty.  FFR negative disease and RPL/D RCA left alone.  Patient reports feeling very well.  He is now  resuming his exercise which is extensive.  No exertional complaints.    Coronary angiogram 8/13/2020  Jose Lynn is a 64 y.o. old male with CAD s/p PCI of LAD in 2010, PCI of OM 2012 with known moderate to severe disease in his RCA and distal LAD, AF, HL who is here for recurrence of angina.     - severe rPDA lesion treated w/ cutting balloon angioplasty due to FFR-negative disease in diffusely moderately diseased rPLV/dRCA; LAD and OM stents are patent  - normal L-sided filling pressures  - should he develop any re-narrowing, would likely need stents in both PLV/PDA bifurcation  - ASA 81mg daily indefinitely, ticagrelor 180mg once, followed by 90mg twice daily for at least 12 months. Can switch for ASA/clopidogrel after 12 mos and continue indefinitely also, if tolerated  - consider Nuc stress in 6 mos  - continue atorva 80, low threshold to consider PCS K9 inhibitors  - continue aggressive risk factor modification          Physical Examination Review of Systems   Vitals:    12/02/20 0755   BP: 138/84   Pulse: 74   Resp: 16   SpO2: 98%     Body mass index is 27.26 kg/m .  Wt Readings from Last 3 Encounters:   12/02/20 190 lb (86.2 kg)   08/27/20 185 lb (83.9 kg)   08/14/20 181 lb 8 oz (82.3 kg)       General Appearance:   alert, no apparent distress   HEENT:  no scleral icterus; the mucous membranes are pink and moist                                  Neck: No jvd   Chest: the spine is straight and the chest is symmetric   Lungs:   respirations unlabored; the lungs are clear to auscultation   Cardiovascular:   regular rhythm with normal first and second heart sounds and no murmurs or gallops   Abdomen:  no organomegaly, masses, bruits, or tenderness; bowel sounds are present   Extremities: no edema   Skin: no xanthelasma    General: WNL  Eyes: WNL  Ears/Nose/Throat: WNL  Lungs: WNL  Heart: WNL  Stomach: WNL  Bladder: WNL  Muscle/Joints: WNL  Skin: WNL  Nervous System: WNL  Mental Health: WNL     Blood: WNL      Medical History  Surgical History Family History Social History   Past Medical History:   Diagnosis Date   ? Cancer (H)     Kidney   ? Coronary artery disease     Past Surgical History:   Procedure Laterality Date   ? APPENDECTOMY     ? CORONARY ANGIOPLASTY WITH STENT PLACEMENT  2010   ? CORONARY ANGIOPLASTY WITH STENT PLACEMENT  2012   ? CV CORONARY ANGIOGRAM N/A 6/22/2018    Procedure: Coronary Angiogram;  Surgeon: Shanique Bourgeois MD;  Location: [facility];  Service:    ? CV CORONARY ANGIOGRAM N/A 8/13/2020    Procedure: Coronary Angiogram;  Surgeon: Steve Neville MD;  Location: [facility];  Service: Cardiology   ? CV LEFT HEART CATHETERIZATION WITH LEFT VENTRICULOGRAM N/A 6/22/2018    Procedure: Left Heart Catheterization with Left Ventriculogram;  Surgeon: Shanique Bourgeois MD;  Location: [facility];  Service:    ? FOOT FRACTURE SURGERY Right 02/15/2010   ? KNEE ARTHROSCOPY W/ MENISCECTOMY Left 07/01/2016   ? NEPHRECTOMY Left     Family History   Problem Relation Age of Onset   ? Hypertension Father    ? Heart attack Father    ? No Medical Problems Mother    ? No Medical Problems Brother    ? Hypertension Sister     Social History     Socioeconomic History   ? Marital status:      Spouse name: Not on file   ? Number of children: Not on file   ? Years of education: Not on file   ? Highest education level: Not on file   Occupational History     Employer: [employer]   Social Needs   ? Financial resource strain: Not on file   ? Food insecurity     Worry: Not on file     Inability: Not on file   ? Transportation needs     Medical: Not on file     Non-medical: Not on file   Tobacco Use   ? Smoking status: Never Smoker   ? Smokeless tobacco: Never Used   Substance and Sexual Activity   ? Alcohol use: No   ? Drug use: No   ? Sexual activity: Yes     Partners: Female   Lifestyle   ? Physical activity     Days per week: 7 days     Minutes per session: 20 min   ? Stress:  Not on file   Relationships   ? Social connections     Talks on phone: Not on file     Gets together: Not on file     Attends Worship service: Not on file     Active member of club or organization: Not on file     Attends meetings of clubs or organizations: Not on file     Relationship status: Not on file   ? Intimate partner violence     Fear of current or ex partner: Not on file     Emotionally abused: Not on file     Physically abused: Not on file     Forced sexual activity: Not on file   Other Topics Concern   ? Not on file   Social History Narrative   ? Not on file          Medications  Allergies   Current Outpatient Medications   Medication Sig Dispense Refill   ? aspirin 81 mg chewable tablet Chew 1 tablet (81 mg total) daily. Take aspirin indefinitely (for the rest of your life). 30 tablet 0   ? atorvastatin (LIPITOR) 80 MG tablet TAKE 1 TABLET BY MOUTH EVERYDAY AT BEDTIME 90 tablet 1   ? cholecalciferol, vitamin D3, 1,000 unit tablet Take 1,000 Units by mouth daily.     ? multivitamin therapeutic tablet Take 1 tablet by mouth daily.     ? nitroglycerin (NITROSTAT) 0.4 MG SL tablet Place 1 tablet (0.4 mg total) under the tongue every 5 (five) minutes as needed for chest pain. 1 Bottle 2   ? omega-3/dha/epa/fish oil (FISH OIL-OMEGA-3 FATTY ACIDS) 300-1,000 mg capsule Take 2 g by mouth daily.     ? polyethylene glycol (MIRALAX) 17 gram packet Take 17 g by mouth daily.     ? sildenafil (REVATIO) 20 mg tablet take 4 tablets by mouth 1 hour prior to activity. max 1 dose every 24 hours 40 tablet 6   ? ticagrelor (BRILINTA) 90 mg Tab Take 1 tablet (90 mg total) by mouth 2 (two) times a day. For 12 months 60 tablet 11     No current facility-administered medications for this visit.       No Known Allergies      Lab Results    Chemistry/lipid CBC Cardiac Enzymes/BNP/TSH/INR   Lab Results   Component Value Date    CHOL 113 08/13/2020    HDL 43 08/13/2020    LDLCALC 58 08/13/2020    TRIG 59 08/13/2020    CREATININE  0.78 08/14/2020    BUN 13 08/14/2020    K 4.0 08/14/2020     08/14/2020     (H) 08/14/2020    CO2 27 08/14/2020    Lab Results   Component Value Date    WBC 3.6 (L) 08/13/2020    HGB 12.4 (L) 08/14/2020    HCT 35.8 (L) 08/13/2020    MCV 88 08/13/2020     08/13/2020    Lab Results   Component Value Date    TROPONINI <0.01 07/28/2020    BNP 41 11/25/2018    TSH 2.15 11/25/2018    INR 0.96 11/07/2019

## 2021-06-30 NOTE — PROGRESS NOTES
Progress Notes by Maile Torres RN at 2/22/2021  8:00 AM     Author: Maile Torres RN Service: -- Author Type: Exercise Phys Spec    Filed: 2/22/2021  8:24 AM Encounter Date: 2/22/2021 Status: Signed    : Maile Torres RN (Registered Nurse)       [facility] Study - Day 13 Call     Removing Patch 2    Study Purpose:   Atrial Fibrillation Algorithm Clinical Validation Study, prospective, multi-center, non significant risk     Subject was called on 02/22/2021 for their Day 13 phone call to instructed subjects on removing device patch 2 and packing and ship ping the box back to sponsor.     Did the subject have a new reportable adverse events since last visit: No  If yes, please generate adverse event report for PI to cosign    Plan: Subject is schedule to return devices today and this will complete this study for the subject.    JEB Puentes, RN

## 2021-06-30 NOTE — PROGRESS NOTES
Progress Notes by Janette Johnson MD at 2/5/2021  8:10 AM     Author: Janette Johnson MD Service: -- Author Type: Physician    Filed: 2/5/2021 10:46 AM Encounter Date: 2/5/2021 Status: Signed    : Janette Johnson MD (Physician)           Thank you, Dr. Bolaños, for asking us to see Jose Lynn at the [facility].      Assessment/Recommendations   Assessment:    1.  Coronary artery disease: History of PCI of LAD, OM with known moderate distal LAD and moderate to severe distal RCA disease.  Patient was experiencing exertional angina and underwent angiogram on 8/13 with severe RPDA disease treated with cutting angioplasty.  Residual disease RPL/D RCA that was FFR negative.  Patient underwent nuclear stress test and had marked EKG changes.  Discussed with him and his wife today and will proceed with repeat angiography.  2.  Atrial fibrillation: 1 isolated episode of atrial fibrillation.  Continue on aspirin.    3.  Dyslipidemia: High-dose statin therapy.  Lipids well controlled.   Plan:  1.  Continue aspirin 81 mg daily indefinitely.  Continue Brilinta 90 mg twice daily for 12 months followed by Plavix indefinitely if tolerated.  2.  Continue Lipitor 80 mg daily  3.    Proceed with coronary angiography with possible intervention and may follow-up with me after the procedure.          History of Present Illness    Mr. Jose Lynn is a 65 y.o. male with history of coronary artery disease status post PCI of LAD in 2010, PCI of OM 2012 with known moderate to severe disease in his RCA and moderate disease in his distal LAD on last angiogram 2018.    In August 2020 he developed exertional chest pain and underwent cutting balloon angioplasty of RPDA.  He was found to have moderate disease FFR negative in RPL/distal RCA.   Stress testing recommended in 6 months and he also needed stress testing done prior to driving.  Nuclear imaging showed a fixed inferior defect  however there was also significant EKG changes that were reviewed personally.  He had 4 mm horizontal ST depressions noted in inferior and anterolateral leads with ST elevation in aVR and aVL.  He is here today accompanied by his wife.  He has been riding his stationary bike daily and shoveling snow and has not noted problems with chest pain or breathing difficulty.  He recalls one time when he did push himself that he felt some sensation of feeling uncomfortable.  He has never had significant symptoms in the past.  Exercise nuclear stress test 2/2/21  1.The nuclear stress test is abnormal.  ?  2.The patient's exercise capacity is above average achieving 11.9 METS on the standard Buck protocol.  ?  3.Adequate strongly positive exercise ECG for ischemia after 10 minutes and 10 seconds in the standard Buck protocol, patient achieving 101% of age-predicted maximal heart rate, significant ST segment depression up to 4 mm in leads II, III, aVF, V5 and V6, but no cardiovascular symptoms.  ?  4.There is a medium sized area of a mild degree of nontransmural infarction in the mid to basal inferoseptal segment(s) of the left ventricle.  No ischemia seen.  ?  5.The left ventricular ejection fraction at stress is 69%.  ?  6.A prior study was conducted on 12/11/2012.  The inferior defect appears more fixed, prior report suggested it was more ischemia and reversible, images unavailable.        Physical Examination Review of Systems   Vitals:    02/05/21 0824   BP: 140/70   Pulse: 64   Resp: 14     Body mass index is 27.26 kg/m .  Wt Readings from Last 3 Encounters:   02/05/21 190 lb (86.2 kg)   12/02/20 190 lb (86.2 kg)   08/27/20 185 lb (83.9 kg)       General Appearance:   alert, no apparent distress   HEENT:  no scleral icterus; the mucous membranes are pink and moist                                  Neck: No jvd   Chest: the spine is straight and the chest is symmetric   Lungs:   respirations unlabored   Cardiovascular:    regular rhythm    Abdomen:  no organomegaly, masses, bruits, or tenderness; bowel sounds are present   Extremities: no edema   Skin: no xanthelasma    General: WNL  Eyes: WNL  Ears/Nose/Throat: WNL  Lungs: WNL  Heart: WNL  Stomach: WNL  Bladder: WNL  Muscle/Joints: WNL  Skin: WNL  Nervous System: WNL  Mental Health: WNL     Blood: WNL     Medical History  Surgical History Family History Social History   Past Medical History:   Diagnosis Date   ? Cancer (H)     Kidney   ? Coronary artery disease     Past Surgical History:   Procedure Laterality Date   ? APPENDECTOMY     ? CORONARY ANGIOPLASTY WITH STENT PLACEMENT  2010   ? CORONARY ANGIOPLASTY WITH STENT PLACEMENT  2012   ? CV CORONARY ANGIOGRAM N/A 6/22/2018    Procedure: Coronary Angiogram;  Surgeon: Shanique Bourgeois MD;  Location: [facility];  Service:    ? CV CORONARY ANGIOGRAM N/A 8/13/2020    Procedure: Coronary Angiogram;  Surgeon: Steve Neville MD;  Location: [facility];  Service: Cardiology   ? CV LEFT HEART CATHETERIZATION WITH LEFT VENTRICULOGRAM N/A 6/22/2018    Procedure: Left Heart Catheterization with Left Ventriculogram;  Surgeon: Shanique Bourgeois MD;  Location: [facility];  Service:    ? FOOT FRACTURE SURGERY Right 02/15/2010   ? KNEE ARTHROSCOPY W/ MENISCECTOMY Left 07/01/2016   ? NEPHRECTOMY Left     Family History   Problem Relation Age of Onset   ? Hypertension Father    ? Heart attack Father    ? No Medical Problems Mother    ? No Medical Problems Brother    ? Hypertension Sister     Social History     Socioeconomic History   ? Marital status:      Spouse name: Not on file   ? Number of children: Not on file   ? Years of education: Not on file   ? Highest education level: Not on file   Occupational History     Employer: [employer]   Social Needs   ? Financial resource strain: Not on file   ? Food insecurity     Worry: Not on file     Inability: Not on file   ? Transportation needs     Medical: Not  on file     Non-medical: Not on file   Tobacco Use   ? Smoking status: Never Smoker   ? Smokeless tobacco: Never Used   Substance and Sexual Activity   ? Alcohol use: No   ? Drug use: No   ? Sexual activity: Yes     Partners: Female   Lifestyle   ? Physical activity     Days per week: 7 days     Minutes per session: 20 min   ? Stress: Not on file   Relationships   ? Social connections     Talks on phone: Not on file     Gets together: Not on file     Attends Hinduism service: Not on file     Active member of club or organization: Not on file     Attends meetings of clubs or organizations: Not on file     Relationship status: Not on file   ? Intimate partner violence     Fear of current or ex partner: Not on file     Emotionally abused: Not on file     Physically abused: Not on file     Forced sexual activity: Not on file   Other Topics Concern   ? Not on file   Social History Narrative   ? Not on file          Medications  Allergies   Current Outpatient Medications   Medication Sig Dispense Refill   ? aspirin 81 mg chewable tablet Chew 1 tablet (81 mg total) daily. Take aspirin indefinitely (for the rest of your life). 30 tablet 0   ? atorvastatin (LIPITOR) 80 MG tablet TAKE 1 TABLET BY MOUTH EVERYDAY AT BEDTIME 90 tablet 1   ? cholecalciferol, vitamin D3, 1,000 unit tablet Take 2,000 Units by mouth daily.      ? multivitamin therapeutic tablet Take 1 tablet by mouth daily.     ? nitroglycerin (NITROSTAT) 0.4 MG SL tablet Place 1 tablet (0.4 mg total) under the tongue every 5 (five) minutes as needed for chest pain. 1 Bottle 2   ? omega-3/dha/epa/fish oil (FISH OIL-OMEGA-3 FATTY ACIDS) 300-1,000 mg capsule Take 2 g by mouth daily.     ? polyethylene glycol (MIRALAX) 17 gram packet Take 17 g by mouth daily.     ? sildenafil (REVATIO) 20 mg tablet take 4 tablets by mouth 1 hour prior to activity. max 1 dose every 24 hours 40 tablet 6   ? ticagrelor (BRILINTA) 90 mg Tab Take 1 tablet (90 mg total) by mouth 2 (two)  times a day. For 12 months 60 tablet 11   ? FLUAD QUAD 2020-21,65Y UP,,PF, 60 mcg (15 mcg x 4)/0.5 mL Syrg        No current facility-administered medications for this visit.       No Known Allergies      Lab Results    Chemistry/lipid CBC Cardiac Enzymes/BNP/TSH/INR   Lab Results   Component Value Date    CHOL 113 08/13/2020    HDL 43 08/13/2020    LDLCALC 58 08/13/2020    TRIG 59 08/13/2020    CREATININE 0.78 08/14/2020    BUN 13 08/14/2020    K 4.0 08/14/2020     08/14/2020     (H) 08/14/2020    CO2 27 08/14/2020    Lab Results   Component Value Date    WBC 3.6 (L) 08/13/2020    HGB 12.4 (L) 08/14/2020    HCT 35.8 (L) 08/13/2020    MCV 88 08/13/2020     08/13/2020    Lab Results   Component Value Date    TROPONINI <0.01 07/28/2020    BNP 41 11/25/2018    TSH 2.15 11/25/2018    INR 0.96 11/07/2019

## 2021-06-30 NOTE — PROGRESS NOTES
Progress Notes by Marko Zheng at 2/4/2021  8:00 AM     Author: Marko Zheng Service: -- Author Type: Patient Access    Filed: 2/4/2021  9:54 AM Encounter Date: 2/4/2021 Status: Signed    : Marko Zheng (Patient Access)       Parrot Study Consent Note    Study Purpose: Atrial Fibrillation Algorithm Clinical Validation Study, prospective, multi-center, non-significant risk     Jose Lynn was called today, 02/04/21, to discuss participation in the Parrot Study. The consent form version 3.0 was reviewed with subject. Subject was provided with a virtual copy of the consent form via [employer] e-consenting software.    The consent discuss included:    Study description and purpose     Qualifications for participation    Screening visit    Study procedures and follow up visits    Participant responsibilities     Study restrictions    Length of study    Study data    Potential risks and discomforts    New information    Potential benefits of participation    Incidental findings    Alternate therapies    Compensation for participation    Cost/Compensation for research related injury    Study Funding    Withdrawal participation    Confidentiality     Study contacts    Legal right    The subject was provided time to review the consent form and consider participation his questions were answered to his satisfaction. The patient has voluntarily agreed to participate in the above noted study.     The consent form version 3.0 and HIPPA form version 3.0 was signed 02/04/21 via [employer] e-consenting software. A copy of the signed consent form is delivered to patient via email from [employer] e-consent software. A copy will be placed in subject's medical record.     Past Medical History:   Diagnosis Date   ? Cancer (H)     Kidney   ? Coronary artery disease        [unfilled]      Current Outpatient Medications:   ?  atorvastatin (LIPITOR) 80 MG tablet, TAKE 1 TABLET BY MOUTH EVERYDAY AT BEDTIME, Disp: 90 tablet,  Rfl: 1  ?  sildenafil (REVATIO) 20 mg tablet, take 4 tablets by mouth 1 hour prior to activity. max 1 dose every 24 hours, Disp: 40 tablet, Rfl: 6  ?  ticagrelor (BRILINTA) 90 mg Tab, Take 1 tablet (90 mg total) by mouth 2 (two) times a day. For 12 months, Disp: 60 tablet, Rfl: 11    No Known Allergies    Marko Zheng

## 2021-07-03 NOTE — ADDENDUM NOTE
Addendum Note by Graham Tamayo at 4/16/2018  9:27 AM     Author: Graham Tamayo Service: -- Author Type: Physician    Filed: 4/16/2018  9:27 AM Encounter Date: 4/16/2018 Status: Signed    : Graham Tamayo    Addended by: GRAHAM TAMAYO on: 4/16/2018 09:27 AM        Modules accepted: Orders

## 2021-07-06 VITALS
DIASTOLIC BLOOD PRESSURE: 78 MMHG | HEART RATE: 47 BPM | WEIGHT: 188.6 LBS | SYSTOLIC BLOOD PRESSURE: 138 MMHG | OXYGEN SATURATION: 96 % | BODY MASS INDEX: 27.06 KG/M2

## 2021-08-08 ENCOUNTER — HEALTH MAINTENANCE LETTER (OUTPATIENT)
Age: 66
End: 2021-08-08

## 2021-08-31 ENCOUNTER — TELEPHONE (OUTPATIENT)
Dept: CARDIOLOGY | Facility: CLINIC | Age: 66
End: 2021-08-31

## 2021-08-31 NOTE — TELEPHONE ENCOUNTER
----- Message from Elizabeth ASIM Knox sent at 8/31/2021 11:31 AM CDT -----  Regarding: Elizabeth pt  General phone call:    Caller: gretta  Primary cardiologist: Elizabeth  Detailed reason for call: Patient is calling and states that he cracked a few ribs and has been taking ibuprofen and would like to know if it is safe for him to take the ibuprofen.  New or active symptoms? N/A  Best phone number: [phone]  Best time to contact: anytime  Ok to leave a detailedmessage? yes  Device? no    Additional Info:

## 2021-08-31 NOTE — TELEPHONE ENCOUNTER
Return call to patient who stated he had fallen while water skiing about 3-4wks ago and has been taking Ibuprofen at night due to discomfort - confirmed patient presently taking Plavix and ASA.    Informed patient that Tylenol would be recommended for pain instead of Ibuprofen because of increased risk of bleeding, bruising and GI issues when already taking Plavix and ASA - encouraged patient to consult PCP if Tylenol is not effective for pain control at night - patient verbalized understanding.  mg

## 2021-09-15 ENCOUNTER — MYC MEDICAL ADVICE (OUTPATIENT)
Dept: INTERNAL MEDICINE | Facility: CLINIC | Age: 66
End: 2021-09-15

## 2021-09-15 DIAGNOSIS — I25.10 CORONARY ARTERY DISEASE DUE TO CALCIFIED CORONARY LESION: Primary | ICD-10-CM

## 2021-09-15 DIAGNOSIS — I25.84 CORONARY ARTERY DISEASE DUE TO CALCIFIED CORONARY LESION: Primary | ICD-10-CM

## 2021-09-16 RX ORDER — ATORVASTATIN CALCIUM 80 MG/1
80 TABLET, FILM COATED ORAL DAILY
Qty: 90 TABLET | Refills: 0 | Status: SHIPPED | OUTPATIENT
Start: 2021-09-16 | End: 2021-12-14

## 2021-09-16 RX ORDER — CLOPIDOGREL BISULFATE 75 MG/1
TABLET ORAL
COMMUNITY
Start: 2021-07-17 | End: 2022-03-07

## 2021-09-16 RX ORDER — CLOPIDOGREL BISULFATE 75 MG/1
75 TABLET ORAL DAILY
Qty: 90 TABLET | Refills: 11 | OUTPATIENT
Start: 2021-09-16

## 2021-10-03 ENCOUNTER — HEALTH MAINTENANCE LETTER (OUTPATIENT)
Age: 66
End: 2021-10-03

## 2021-12-14 ENCOUNTER — MYC REFILL (OUTPATIENT)
Dept: INTERNAL MEDICINE | Facility: CLINIC | Age: 66
End: 2021-12-14
Payer: COMMERCIAL

## 2021-12-14 DIAGNOSIS — I25.84 CORONARY ARTERY DISEASE DUE TO CALCIFIED CORONARY LESION: ICD-10-CM

## 2021-12-14 DIAGNOSIS — I25.10 CORONARY ARTERY DISEASE DUE TO CALCIFIED CORONARY LESION: ICD-10-CM

## 2021-12-16 RX ORDER — ATORVASTATIN CALCIUM 80 MG/1
80 TABLET, FILM COATED ORAL DAILY
Qty: 90 TABLET | Refills: 0 | Status: SHIPPED | OUTPATIENT
Start: 2021-12-16 | End: 2022-01-04

## 2021-12-16 NOTE — TELEPHONE ENCOUNTER
"Last Written Prescription Date:  9/16/21  Last Fill Quantity: 90,  # refills: 0   Last office visit provider:  6/4/21     Requested Prescriptions   Pending Prescriptions Disp Refills     atorvastatin (LIPITOR) 80 MG tablet 90 tablet 0     Sig: Take 1 tablet (80 mg) by mouth daily       Statins Protocol Passed - 12/14/2021  1:27 PM        Passed - LDL on file in past 12 months     Recent Labs   Lab Test 02/11/21  0743   LDL 61             Passed - No abnormal creatine kinase in past 12 months     No lab results found.             Passed - Recent (12 mo) or future (30 days) visit within the authorizing provider's specialty     Patient has had an office visit with the authorizing provider or a provider within the authorizing providers department within the previous 12 mos or has a future within next 30 days. See \"Patient Info\" tab in inbasket, or \"Choose Columns\" in Meds & Orders section of the refill encounter.              Passed - Medication is active on med list        Passed - Patient is age 18 or older             Kathleen Marques RN 12/16/21 11:25 AM  "

## 2021-12-22 ENCOUNTER — TRANSFERRED RECORDS (OUTPATIENT)
Dept: HEALTH INFORMATION MANAGEMENT | Facility: CLINIC | Age: 66
End: 2021-12-22
Payer: COMMERCIAL

## 2022-01-04 ENCOUNTER — OFFICE VISIT (OUTPATIENT)
Dept: INTERNAL MEDICINE | Facility: CLINIC | Age: 67
End: 2022-01-04
Payer: COMMERCIAL

## 2022-01-04 VITALS
SYSTOLIC BLOOD PRESSURE: 120 MMHG | BODY MASS INDEX: 27.92 KG/M2 | HEIGHT: 70 IN | WEIGHT: 195 LBS | DIASTOLIC BLOOD PRESSURE: 80 MMHG | HEART RATE: 54 BPM

## 2022-01-04 DIAGNOSIS — Z51.81 ENCOUNTER FOR THERAPEUTIC DRUG MONITORING: ICD-10-CM

## 2022-01-04 DIAGNOSIS — Z85.828 HISTORY OF SKIN CANCER: ICD-10-CM

## 2022-01-04 DIAGNOSIS — I10 ESSENTIAL HYPERTENSION: ICD-10-CM

## 2022-01-04 DIAGNOSIS — Z86.0100 HISTORY OF COLONIC POLYPS: ICD-10-CM

## 2022-01-04 DIAGNOSIS — I25.84 CORONARY ARTERY DISEASE DUE TO CALCIFIED CORONARY LESION: ICD-10-CM

## 2022-01-04 DIAGNOSIS — Z12.5 SCREENING FOR PROSTATE CANCER: ICD-10-CM

## 2022-01-04 DIAGNOSIS — E78.00 HYPERCHOLESTEROLEMIA: ICD-10-CM

## 2022-01-04 DIAGNOSIS — I25.10 CORONARY ARTERY DISEASE DUE TO CALCIFIED CORONARY LESION: ICD-10-CM

## 2022-01-04 DIAGNOSIS — Z23 HIGH PRIORITY FOR 2019-NCOV VACCINE: ICD-10-CM

## 2022-01-04 DIAGNOSIS — Z00.00 ENCOUNTER FOR WELLNESS EXAMINATION IN ADULT: Primary | ICD-10-CM

## 2022-01-04 LAB
ALBUMIN SERPL-MCNC: 3.9 G/DL (ref 3.5–5)
ALP SERPL-CCNC: 77 U/L (ref 45–120)
ALT SERPL W P-5'-P-CCNC: 25 U/L (ref 0–45)
ANION GAP SERPL CALCULATED.3IONS-SCNC: 9 MMOL/L (ref 5–18)
AST SERPL W P-5'-P-CCNC: 21 U/L (ref 0–40)
BILIRUB SERPL-MCNC: 0.7 MG/DL (ref 0–1)
BUN SERPL-MCNC: 13 MG/DL (ref 8–22)
CALCIUM SERPL-MCNC: 10.1 MG/DL (ref 8.5–10.5)
CHLORIDE BLD-SCNC: 105 MMOL/L (ref 98–107)
CO2 SERPL-SCNC: 27 MMOL/L (ref 22–31)
CREAT SERPL-MCNC: 0.87 MG/DL (ref 0.7–1.3)
ERYTHROCYTE [DISTWIDTH] IN BLOOD BY AUTOMATED COUNT: 11.7 % (ref 10–15)
GFR SERPL CREATININE-BSD FRML MDRD: >90 ML/MIN/1.73M2
GLUCOSE BLD-MCNC: 88 MG/DL (ref 70–125)
HCT VFR BLD AUTO: 37.3 % (ref 40–53)
HGB BLD-MCNC: 12.6 G/DL (ref 13.3–17.7)
LDLC SERPL CALC-MCNC: 76 MG/DL
MCH RBC QN AUTO: 30.1 PG (ref 26.5–33)
MCHC RBC AUTO-ENTMCNC: 33.8 G/DL (ref 31.5–36.5)
MCV RBC AUTO: 89 FL (ref 78–100)
PLATELET # BLD AUTO: 214 10E3/UL (ref 150–450)
POTASSIUM BLD-SCNC: 4.6 MMOL/L (ref 3.5–5)
PROT SERPL-MCNC: 6.9 G/DL (ref 6–8)
PSA SERPL-MCNC: 0.15 UG/L (ref 0–4.5)
RBC # BLD AUTO: 4.18 10E6/UL (ref 4.4–5.9)
SODIUM SERPL-SCNC: 141 MMOL/L (ref 136–145)
WBC # BLD AUTO: 5.7 10E3/UL (ref 4–11)

## 2022-01-04 PROCEDURE — G0103 PSA SCREENING: HCPCS | Performed by: INTERNAL MEDICINE

## 2022-01-04 PROCEDURE — 83721 ASSAY OF BLOOD LIPOPROTEIN: CPT | Performed by: INTERNAL MEDICINE

## 2022-01-04 PROCEDURE — 99397 PER PM REEVAL EST PAT 65+ YR: CPT | Performed by: INTERNAL MEDICINE

## 2022-01-04 PROCEDURE — 85027 COMPLETE CBC AUTOMATED: CPT | Performed by: INTERNAL MEDICINE

## 2022-01-04 PROCEDURE — 80053 COMPREHEN METABOLIC PANEL: CPT | Performed by: INTERNAL MEDICINE

## 2022-01-04 PROCEDURE — 36415 COLL VENOUS BLD VENIPUNCTURE: CPT | Performed by: INTERNAL MEDICINE

## 2022-01-04 RX ORDER — POLYETHYLENE GLYCOL 3350 17 G/17G
17 POWDER, FOR SOLUTION ORAL DAILY
COMMUNITY

## 2022-01-04 RX ORDER — CHLORAL HYDRATE 500 MG
2 CAPSULE ORAL PRN
COMMUNITY

## 2022-01-04 ASSESSMENT — ACTIVITIES OF DAILY LIVING (ADL): CURRENT_FUNCTION: NO ASSISTANCE NEEDED

## 2022-01-04 ASSESSMENT — MIFFLIN-ST. JEOR: SCORE: 1665.76

## 2022-01-04 NOTE — PATIENT INSTRUCTIONS
I would recommend a flu shot this season.  Influenza is currently circulating in the community.    Check your blood pressure periodically.  If blood pressure running above 135/85, contact me to consider restarting blood pressure medication.    You are due for yearly cardiology follow-up appointment in February or March.  Discussed with cardiology about stopping the clopidogrel at that time, and just using aspirin.  But discuss that with cardiology first.    Your colonoscopy will be due July 2024.    Follow-up with me in 1 year for physical exam, if no other new issues.

## 2022-01-04 NOTE — PROGRESS NOTES
SUBJECTIVE:   Jose Lynn is a 66 year old male who presents for Preventive Visit.  Lives independently with wife.  Enjoyed the holidays with multiple grandkids.  Everyone is healthy at home.  He denies any new cough or fever.    He drives a school bus currently.    He is active with exercise bike about 20 minutes every day.  No increasing shortness of breath or chest pain.  No muscle skeletal pain complaints.    Coronary disease with drug-eluting stent to the LAD in 2010.  Drug-eluting stent to the OM in 2012.  PTCA of RCA August 2020.  February 2021 cardiac catheterization showed 40-75% stenosis but stents were patent and no new intervention at that time.  This was after a stress test with some distal ischemia.    History of basal inferior septal MI.    History of low blood pressure no longer on lisinopril.  He is not checked his blood pressure recently.    He continues on Plavix and aspirin.  No epigastric pain or bleeding issues.  No falls.    No generalized myalgias on Lipitor 80 mg.  February 2021 LDL 61 and HDL 45 with normal creatinine and blood sugar.    No history of diabetes.    He does have a family history of cardiac disease.    He had an episode of paroxysmal atrial fibrillation 2018 without recent recurrence.  No palpitations.    Denies history of alcohol.  No daytime sleepiness.    He is never smoked.    There was a questionable episode of hematuria in June of this year, patient states he saw urology although I do not see that note.  He stated that he was not given a cystoscopy or further work-up.  No urinary symptoms.  1-2 times a night nocturia.  No dysuria.  No flank pain.    Bowels are normal and no blood in stool.  No abdominal pain complaints.  July 2019 colonoscopy with a 3 mm tubular adenoma, 5-year follow-up plan.    He just saw dermatology yesterday, no new skin lesions.  History of Mohs surgery in 2019.  He was given a 1 year follow-up.    He saw ophthalmology earlier this year, no  "changes.  No new headaches.  Given a 1 year follow-up.    No mouth sores or swallowing difficulty.    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    Patient has been advised of split billing requirements and indicates understanding: Yes   Are you in the first 12 months of your Medicare coverage?  No    Imm/Inj    Healthy Habits:     In general, how would you rate your overall health?  Good    Frequency of exercise:  4-5 days/week    Duration of exercise:  15-30 minutes    Do you usually eat at least 4 servings of fruit and vegetables a day, include whole grains    & fiber and avoid regularly eating high fat or \"junk\" foods?  Yes    Taking medications regularly:  Yes    Medication side effects:  None    Ability to successfully perform activities of daily living:  No assistance needed    Home Safety:  No safety concerns identified    Hearing Impairment:  No hearing concerns    In the past 6 months, have you been bothered by leaking of urine?  No    In general, how would you rate your overall mental or emotional health?  Excellent      PHQ-2 Total Score: 0    Additional concerns today:  Yes    Do you feel safe in your environment? Yes    Have you ever done Advance Care Planning? (For example, a Health Directive, POLST, or a discussion with a medical provider or your loved ones about your wishes): Yes, advance care planning is on file.      Fall risk  Fallen 2 or more times in the past year?: No  Any fall with injury in the past year?: No  click delete button to remove this line now  Cognitive Screening   1) Repeat 3 items (Leader, Season, Table)    2) Clock draw: NORMAL  3) 3 item recall: Recalls 2 objects   Results: 3 items recalled: COGNITIVE IMPAIRMENT LESS LIKELY    Mini-CogTM Copyright HILTON Alcaraz. Licensed by the author for use in [facility]; reprinted with permission (antoinette@.[facility]). All rights reserved.      Do you have sleep apnea, excessive " snoring or daytime drowsiness?: no    Reviewed and updated as needed this visit by clinical staff  Tobacco  Allergies              Reviewed and updated as needed this visit by Provider               Social History     Tobacco Use     Smoking status: Never Smoker     Smokeless tobacco: Never Used   Substance Use Topics     Alcohol use: No     If you drink alcohol do you typically have >3 drinks per day or >7 drinks per week? No    Alcohol Use 1/4/2022   Prescreen: >3 drinks/day or >7 drinks/week? No   No flowsheet data found.      Current providers sharing in care for this patient include:   Patient Care Team:  Aki Bolaños MD as PCP - General (Internal Medicine)  Shivam Amador MD as Assigned PCP  Janette Johnson MD as Assigned Heart and Vascular Provider    The following health maintenance items are reviewed in Epic and correct as of today:  Health Maintenance Due   Topic Date Due     ANNUAL REVIEW OF  ORDERS  Never done     DTAP/TDAP/TD IMMUNIZATION (1 - Tdap) 12/06/2012     EYE EXAM  11/07/2017     MEDICARE ANNUAL WELLNESS VISIT  06/04/2020     INFLUENZA VACCINE (1) 09/01/2021     COVID-19 Vaccine (3 - Booster for Moderna series) 09/13/2021     Lab work is in process  Patient Active Problem List   Diagnosis     Dyslipidemia, goal LDL below 70     Coronary Artery Disease     Kidney Cancer     Basal Cell Carcinoma Of The Skin     Palpitations     Paroxysmal atrial fibrillation (H)     Sinus bradycardia     Abnormal stress test     Benign neoplasm of ascending colon     Diaphragmatic hernia     Disorder of function of stomach     Diverticular disease of large intestine     Gastric ulcer without hemorrhage or perforation     Polyp of colon     Past Surgical History:   Procedure Laterality Date     ANGIOPLASTY  2010     ANGIOPLASTY  2012     APPENDECTOMY       ARTHROSCOPY KNEE WITH MENISCECTOMY Left 07/01/2016     CV CORONARY ANGIOGRAM N/A 6/22/2018    Procedure: Coronary Angiogram;  Surgeon: Shanique  MD Christelle;  Location: [facility] Cath Lab;  Service:      CV CORONARY ANGIOGRAM N/A 8/13/2020    Procedure: Coronary Angiogram;  Surgeon: Steve Neville MD;  Location: [facility] Cath Lab;  Service: Cardiology     CV CORONARY ANGIOGRAM N/A 2/11/2021    Procedure: Coronary Angiogram;  Surgeon: Keven Monroy MD;  Location: [facility] Cardiac Cath Lab;  Service: Cardiology     CV LEFT HEART CATHETERIZATION WITH LEFT VENTRICULOGRAM N/A 6/22/2018    Procedure: Left Heart Catheterization with Left Ventriculogram;  Surgeon: Shanique Bourgeois MD;  Location: [facility] Cath Lab;  Service:      FOOT FRACTURE SURGERY Right 02/15/2010     NEPHRECTOMY Left      REPAIR MOHS Bilateral 10/23/2019    Procedure: Mohs Reconstruction nasal, left cheek graft;  Surgeon: Alexei Stewart MD;  Location:  OR       Social History     Tobacco Use     Smoking status: Never Smoker     Smokeless tobacco: Never Used   Substance Use Topics     Alcohol use: No     Family History   Problem Relation Age of Onset     Hypertension Father      Cerebrovascular Disease Paternal Grandmother      Cancer No family hx of      Diabetes No family hx of      Thyroid Disease No family hx of      Glaucoma No family hx of      Macular Degeneration No family hx of      Coronary Artery Disease Father      No Known Problems Mother      No Known Problems Brother      Hypertension Sister          Current Outpatient Medications   Medication Sig Dispense Refill     ASPIRIN EC PO Take 81 mg by mouth daily        atorvastatin (LIPITOR) 80 MG tablet Take 1 tablet (80 mg) by mouth daily 90 tablet 0     clopidogrel (PLAVIX) 75 MG tablet        multivitamin, therapeutic with minerals (MULTI-VITAMIN) TABS Take 1 tablet by mouth daily       Nitroglycerin (NITROSTAT SL) Place 0.4 mg under the tongue       Omega-3 1000 MG capsule Take 2 g by mouth       polyethylene glycol (MIRALAX) 17 GM/Dose powder Take 17 g by mouth       vitamin E 3000 UNIT/GM CREA     "    cholecalciferol 25 MCG (1000 UT) TABS Take 2,000 Units by mouth       No Known Allergies        Review of Systems  Constitutional, HEENT, cardiovascular, pulmonary, GI, , musculoskeletal, neuro, skin, endocrine and psych systems are negative, except as otherwise noted.    OBJECTIVE:   /80 (BP Location: Right arm, Patient Position: Sitting)   Pulse 54   Ht 1.778 m (5' 10\")   Wt 88.5 kg (195 lb)   BMI 27.98 kg/m   Estimated body mass index is 27.98 kg/m  as calculated from the following:    Height as of this encounter: 1.778 m (5' 10\").    Weight as of this encounter: 88.5 kg (195 lb).  Physical Exam  Thin healthy-appearing male.  Alert and oriented x3 with good mood and affect.  Normal clock face drawing.  Did forget 1 out of 3 words.  Normal mobility.  Pupils NOSs equal and reactive.  Extraocular muscles intact.  No jaundice or conjunctivitis.  He does have hearing aids.  Minimal cerumen.  Normal tympanic membranes.  No cervical or supraclavicular or axillary adenopathy.  No JVD and no carotid bruits.  No thyromegaly or nodularity.  Lungs are clear to auscultation with good respiratory excursion.  Heart is regular without murmur rub or gallop.  There is no ankle edema and +1 pedal pulses, feet in good condition.  Abdomen is nonobese nontender no hepatosplenomegaly.  No pulsatile abdominal mass.  Skin exam without suspicious skin lesions.  Rectal exam with a small smooth prostate without nodule.  ASSESSMENT / PLAN:   (Z00.00) Encounter for wellness examination in adult  (primary encounter diagnosis)  Comment: Emphasized the importance of regular exercise which she is getting with an exercise bike daily.    Covid booster given today, I recommended to get a flu shot as soon as possible but he did not want to get that today.  Plan: Full Code        Routine eye exam in the fall for yearly check.    History of elevated blood pressure but normal at this time, no longer on lisinopril.  I did recommend he " monitor blood pressure.  See patient instructions.    Patient instructions:  I would recommend a flu shot this season.  Influenza is currently circulating in the community.    Check your blood pressure periodically.  If blood pressure running above 135/85, contact me to consider restarting blood pressure medication.    You are due for yearly cardiology follow-up appointment in February or March.  Discussed with cardiology about stopping the clopidogrel at that time, and just using aspirin.  But discuss that with cardiology first.    Your colonoscopy will be due July 2024.    Follow-up with me in 1 year for physical exam, if no other new issues.      (Z86.010) History of colonic polyps  Comment: No change in bowels.  5-year follow-up colonoscopy July 2024  Plan:     (Z12.5) Screening for prostate cancer  Comment:   Plan: Prostate Specific Antigen Screen            (Z23) High priority for 2019-nCoV vaccine  Comment: Given today.  He was warned about possible immune reaction  Plan: COVID-19,PF,MODERNA (18+ Yrs BOOSTER .25mL)        Patient reportedly changed his mind and refused to get the booster shot after the visit was over, I have strongly encouraged him to proceed with the booster shot, but he declined.    (I25.10,  I25.84) Coronary artery disease due to calcified coronary lesion  Comment: Asymptomatic with adequate exercise tolerance  Plan: Aspirin and Plavix till least February.  I recommended he follow-up with cardiology this spring to discuss duration of Plavix treatment.  Lipitor 80 mg.    (I10) Essential hypertension  Comment: He has not had recurrent hypertension, off lisinopril  Plan:     (Z85.828) History of skin cancer  Comment: He just had yearly follow-up with dermatology yesterday, 1 year follow-up  Plan:     (E78.00) Hypercholesterolemia  Comment: Lipitor 80 mg.  No generalized myalgias or right upper quadrant pain or history of liver problems  Plan: LDL cholesterol direct        Goal LDL less than  "80    (Z51.81) Encounter for therapeutic drug monitoring  Comment:   Plan: CBC with platelets, Comprehensive metabolic         panel              Patient has been advised of split billing requirements and indicates understanding: No  COUNSELING:  Reviewed preventive health counseling, as reflected in patient instructions       Regular exercise       Healthy diet/nutrition       Vision screening       Colon cancer screening    Estimated body mass index is 27.98 kg/m  as calculated from the following:    Height as of this encounter: 1.778 m (5' 10\").    Weight as of this encounter: 88.5 kg (195 lb).        He reports that he has never smoked. He has never used smokeless tobacco.      Appropriate preventive services were discussed with this patient, including applicable screening as appropriate for cardiovascular disease, diabetes, osteopenia/osteoporosis, and glaucoma.  As appropriate for age/gender, discussed screening for colorectal cancer, prostate cancer, breast cancer, and cervical cancer. Checklist reviewing preventive services available has been given to the patient.    Reviewed patients plan of care and provided an AVS. The Basic Care Plan (routine screening as documented in Health Maintenance) for Jose meets the Care Plan requirement. This Care Plan has been established and reviewed with the Patient.    Counseling Resources:  ATP IV Guidelines  Pooled Cohorts Equation Calculator  Breast Cancer Risk Calculator  Breast Cancer: Medication to Reduce Risk  FRAX Risk Assessment  ICSI Preventive Guidelines  Dietary Guidelines for Americans, 2010  Bloc's MyPlate  ASA Prophylaxis  Lung CA Screening    Aki Bolaños MD  [facility]    Identified Health Risks:  "

## 2022-03-06 DIAGNOSIS — I25.10 CORONARY ATHEROSCLEROSIS: Primary | ICD-10-CM

## 2022-03-07 RX ORDER — CLOPIDOGREL BISULFATE 75 MG/1
TABLET ORAL
Qty: 90 TABLET | Refills: 0 | Status: SHIPPED | OUTPATIENT
Start: 2022-03-07 | End: 2022-04-11

## 2022-05-27 ENCOUNTER — OFFICE VISIT (OUTPATIENT)
Dept: CARDIOLOGY | Facility: CLINIC | Age: 67
End: 2022-05-27
Attending: INTERNAL MEDICINE
Payer: COMMERCIAL

## 2022-05-27 VITALS
DIASTOLIC BLOOD PRESSURE: 76 MMHG | BODY MASS INDEX: 28.84 KG/M2 | SYSTOLIC BLOOD PRESSURE: 120 MMHG | RESPIRATION RATE: 18 BRPM | WEIGHT: 201 LBS | HEART RATE: 50 BPM

## 2022-05-27 DIAGNOSIS — I25.10 CORONARY ARTERY DISEASE INVOLVING NATIVE CORONARY ARTERY OF NATIVE HEART WITHOUT ANGINA PECTORIS: Primary | ICD-10-CM

## 2022-05-27 PROCEDURE — 99214 OFFICE O/P EST MOD 30 MIN: CPT | Performed by: INTERNAL MEDICINE

## 2022-05-27 NOTE — LETTER
5/27/2022    Akisoy Bolaños MD  [address] [place] [address]    RE: Jose Lynn       Dear Colleague,     I had the pleasure of seeing Jose Lynn in the [facility].    HEART CARE ENCOUNTER CONSULTATON NOTE      [facility]  [phone]      Assessment/Recommendations   Assessment:  1.  Coronary artery disease: History of PCI of LAD, OM with known moderate distal LAD and moderate to severe distal RCA disease.  Patient was experiencing exertional angina and underwent angiogram on 8/13 with severe RPDA disease treated with cutting angioplasty.  Residual disease RPL/D RCA that was FFR negative.  Patient underwent nuclear stress test and had marked EKG changes.    He underwent repeat coronary angiogram last year that showed mild to moderate disease diffusely negative for FFR.  No anginal complaints.  2.  Atrial fibrillation: 1 isolated episode of atrial fibrillation.  Continue on aspirin.    3.  Dyslipidemia: High-dose statin therapy.  Lipids well controlled.    Plan:  1.  Continue dual antiplatelet therapy.  Continue aspirin Plavix indefinitely if tolerates  2.  Continue Lipitor 80 mg daily  3.  Follow-up in a year       History of Present Illness/Subjective    HPI: Jose Lynn is a 66 year old male with history of coronary artery disease status post PCI of LAD in 2010, PCI of OM 2012, status post cutting balloon angioplasty of RPDA in August 2020 with known moderate disease that is FFR negative and RPL/distal RCA.    Last angiogram done a year ago after an abnormal stress test showed mild to moderate disease FFR negative in first RPL.  He is very active.  He bikes almost daily he denies any issues with breathing or chest pain.      Recent Coronary Angiogram Results:    1st RPL lesion is 55% stenosed.    Pressure wire/FFR was performed on the lesion. pre diagnositic: 0.99. post diagnostic: 0.86.    Pressure wire/FFR was performed on the lesion.    1st Mrg  lesion is 5% stenosed.    1st Sept lesion is 75% stenosed.    Mid LAD lesion is 40% stenosed.    RPDA lesion is 40% stenosed.    Mid RCA lesion is 40% stenosed.    Dist RCA lesion is 40% stenosed.    The LM vessel was moderate and is considered normal.    Estimated blood loss was <20 ml.     64 yo male s/p PCI to LAD/Circ with MK and PTCA with cutting balloon 8/2020 returns with min symptoms on excessive exertion with abn ECG and fixed defect inf wall on stress nuclear     Angiography via left radial  LM min dz  LAD stent prox to mid patent, focal 40% mid and 75% ostial first septal (no change)  Circ stent patent in OM  RCA mod diffuse dz in prox/mid 40-50%, proximal take off of PDA with 40% narrowing in distal RCA and ostial 40% narrowing (much improved since PTCA 8/20) and 60% narrowing in prox PL branch with FFR 0.86     Imp/plan  1. Abn stress imaging ECG - no severe epicardial disease to account for ECG changes, patent segment with PTCA 8/20 and no severe dz in PL branch.  Will stop brillinta and change to clopidogrel for additional year, cont with goal LDL < 70mg/dL, interval   training with biking and follow up with Dr. Bolaños and Dr. Johnson.          Physical Examination  Review of Systems   Vitals: /76 (BP Location: Left arm, Patient Position: Sitting, Cuff Size: Adult Regular)   Pulse 50   Resp 18   Wt 91.2 kg (201 lb)   BMI 28.84 kg/m    BMI= Body mass index is 28.84 kg/m .  Wt Readings from Last 3 Encounters:   05/27/22 91.2 kg (201 lb)   01/04/22 88.5 kg (195 lb)   06/04/21 85.5 kg (188 lb 9.6 oz)       General Appearance:   no distress, normal body habitus   ENT/Mouth: membranes moist, no oral lesions or bleeding gums.      EYES:  no scleral icterus, normal conjunctivae   Neck: no carotid bruits or thyromegaly   Chest/Lungs:   lungs are clear to auscultation   Cardiovascular:   Regular. Normal first and second heart sounds with no murmurs, rubs, or gallops; the carotid, radial and posterior  tibial pulses are intact, no jvd, no edema bilaterally    Abdomen:  no organomegaly, masses, bruits, or tenderness; bowel sounds are present   Extremities: no cyanosis or clubbing   Skin: no xanthelasma, warm.    Neurologic: normal  bilateral, no tremors     Psychiatric: alert and oriented x3, calm        Please refer above for cardiac ROS details.        Medical History  Surgical History Family History Social History   Past Medical History:   Diagnosis Date     Basal cell carcinoma of skin      Cancer (H)     Kidney     Coronary artery disease      Palpitations      Paroxysmal atrial fibrillation (H)      PONV (postoperative nausea and vomiting)      Sinus bradycardia      Past Surgical History:   Procedure Laterality Date     ANGIOPLASTY  2010     ANGIOPLASTY  2012     APPENDECTOMY       ARTHROSCOPY KNEE WITH MENISCECTOMY Left 07/01/2016     CV CORONARY ANGIOGRAM N/A 6/22/2018    Procedure: Coronary Angiogram;  Surgeon: Shanique Bourgeois MD;  Location: [facility] Cath Lab;  Service:      CV CORONARY ANGIOGRAM N/A 8/13/2020    Procedure: Coronary Angiogram;  Surgeon: Steve Neville MD;  Location: [facility] Cath Lab;  Service: Cardiology     CV CORONARY ANGIOGRAM N/A 2/11/2021    Procedure: Coronary Angiogram;  Surgeon: Keven Monroy MD;  Location: [facility] Cardiac Cath Lab;  Service: Cardiology     CV LEFT HEART CATHETERIZATION WITH LEFT VENTRICULOGRAM N/A 6/22/2018    Procedure: Left Heart Catheterization with Left Ventriculogram;  Surgeon: Shanique Bourgeois MD;  Location: [facility] Cath Lab;  Service:      FOOT FRACTURE SURGERY Right 02/15/2010     NEPHRECTOMY Left      REPAIR MOHS Bilateral 10/23/2019    Procedure: Mohs Reconstruction nasal, left cheek graft;  Surgeon: Alexei Stewart MD;  Location:  OR     Family History   Problem Relation Age of Onset     Hypertension Father      Cerebrovascular Disease Paternal Grandmother      Cancer No family hx of      Diabetes No family hx of       Thyroid Disease No family hx of      Glaucoma No family hx of      Macular Degeneration No family hx of      Coronary Artery Disease Father      No Known Problems Mother      No Known Problems Brother      Hypertension Sister         Social History     Socioeconomic History     Marital status:      Spouse name: Not on file     Number of children: Not on file     Years of education: Not on file     Highest education level: Not on file   Occupational History     Not on file   Tobacco Use     Smoking status: Never Smoker     Smokeless tobacco: Never Used   Substance and Sexual Activity     Alcohol use: No     Drug use: No     Sexual activity: Yes     Partners: Female   Other Topics Concern     Not on file   Social History Narrative     Not on file     Social Determinants of Health     Financial Resource Strain: Not on file   Food Insecurity: Not on file   Transportation Needs: Not on file   Physical Activity: Not on file   Stress: Not on file   Social Connections: Not on file   Intimate Partner Violence: Not on file   Housing Stability: Not on file           Medications  Allergies   Current Outpatient Medications   Medication Sig Dispense Refill     ASPIRIN EC PO Take 81 mg by mouth daily        atorvastatin (LIPITOR) 80 MG tablet Take 1 tablet (80 mg) by mouth daily 90 tablet 0     clopidogrel (PLAVIX) 75 MG tablet TAKE ONE TABLET BY MOUTH ONE TIME DAILY 90 tablet 0     multivitamin w/minerals (THERA-VIT-M) tablet Take 1 tablet by mouth daily       Nitroglycerin (NITROSTAT SL) Place 0.4 mg under the tongue       Omega-3 1000 MG capsule Take 2 g by mouth       polyethylene glycol (MIRALAX) 17 GM/Dose powder Take 17 g by mouth       sildenafil (REVATIO) 20 MG tablet Take 4 tablets (80 mg) by mouth as needed in the morning (Erectile dysfunction). Do not take within 24 hours of nitroglycerin or nitrate medication. 40 tablet 6     cholecalciferol 25 MCG (1000 UT) TABS Take 2,000 Units by mouth (Patient not  taking: Reported on 5/27/2022)       vitamin E 3000 UNIT/GM CREA  (Patient not taking: Reported on 5/27/2022)       No Known Allergies       Lab Results    Chemistry/lipid CBC Cardiac Enzymes/BNP/TSH/INR   Recent Labs   Lab Test 01/04/22  1205 02/11/21  0743   CHOL  --  116   HDL  --  45   LDL 76 61   TRIG  --  49     Recent Labs   Lab Test 01/04/22  1205 02/11/21  0743 08/13/20  0846   LDL 76 61 58     Recent Labs   Lab Test 01/04/22  1205      POTASSIUM 4.6   CHLORIDE 105   CO2 27   GLC 88   BUN 13   CR 0.87   GFRESTIMATED >90   RUPAL 10.1     Recent Labs   Lab Test 01/04/22  1205 02/11/21  0743 08/14/20  0514   CR 0.87 0.81 0.78     No results for input(s): A1C in the last 97590 hours.       Recent Labs   Lab Test 01/04/22  1205   WBC 5.7   HGB 12.6*   HCT 37.3*   MCV 89        Recent Labs   Lab Test 01/04/22  1205 02/11/21  0743 08/14/20  0514   HGB 12.6* 12.4* 12.4*    Recent Labs   Lab Test 07/28/20  0805 11/07/19  2311 11/25/18  1128   TROPONINI <0.01 0.01 0.01     Recent Labs   Lab Test 11/25/18  0619   BNP 41     Recent Labs   Lab Test 11/25/18  1128   TSH 2.15     Recent Labs   Lab Test 11/07/19  2311 07/02/19  0633 11/25/18  0619   INR 0.96 1.04 0.96        Janette Johnson MD    Thank you for allowing me to participate in the care of your patient.      Sincerely,     Janette Johnson MD     [facility]  cc:   Janette Johnson MD  [address]  [address]  [place] [address]

## 2022-05-27 NOTE — PROGRESS NOTES
HEART CARE ENCOUNTER CONSULTATON NOTE      [facility]  [phone]      Assessment/Recommendations   Assessment:  1.  Coronary artery disease: History of PCI of LAD, OM with known moderate distal LAD and moderate to severe distal RCA disease.  Patient was experiencing exertional angina and underwent angiogram on 8/13 with severe RPDA disease treated with cutting angioplasty.  Residual disease RPL/D RCA that was FFR negative.  Patient underwent nuclear stress test and had marked EKG changes.    He underwent repeat coronary angiogram last year that showed mild to moderate disease diffusely negative for FFR.  No anginal complaints.  2.  Atrial fibrillation: 1 isolated episode of atrial fibrillation.  Continue on aspirin.    3.  Dyslipidemia: High-dose statin therapy.  Lipids well controlled.    Plan:  1.  Continue dual antiplatelet therapy.  Continue aspirin Plavix indefinitely if tolerates  2.  Continue Lipitor 80 mg daily  3.  Follow-up in a year       History of Present Illness/Subjective    HPI: Jose Lynn is a 66 year old male with history of coronary artery disease status post PCI of LAD in 2010, PCI of OM 2012, status post cutting balloon angioplasty of RPDA in August 2020 with known moderate disease that is FFR negative and RPL/distal RCA.    Last angiogram done a year ago after an abnormal stress test showed mild to moderate disease FFR negative in first RPL.  He is very active.  He bikes almost daily he denies any issues with breathing or chest pain.      Recent Coronary Angiogram Results:    1st RPL lesion is 55% stenosed.    Pressure wire/FFR was performed on the lesion. pre diagnositic: 0.99. post diagnostic: 0.86.    Pressure wire/FFR was performed on the lesion.    1st Mrg lesion is 5% stenosed.    1st Sept lesion is 75% stenosed.    Mid LAD lesion is 40% stenosed.    RPDA lesion is 40% stenosed.    Mid RCA lesion is 40% stenosed.    Dist RCA lesion is 40% stenosed.    The LM  vessel was moderate and is considered normal.    Estimated blood loss was <20 ml.     66 yo male s/p PCI to LAD/Circ with MK and PTCA with cutting balloon 8/2020 returns with min symptoms on excessive exertion with abn ECG and fixed defect inf wall on stress nuclear     Angiography via left radial  LM min dz  LAD stent prox to mid patent, focal 40% mid and 75% ostial first septal (no change)  Circ stent patent in OM  RCA mod diffuse dz in prox/mid 40-50%, proximal take off of PDA with 40% narrowing in distal RCA and ostial 40% narrowing (much improved since PTCA 8/20) and 60% narrowing in prox PL branch with FFR 0.86     Imp/plan  1. Abn stress imaging ECG - no severe epicardial disease to account for ECG changes, patent segment with PTCA 8/20 and no severe dz in PL branch.  Will stop brillinta and change to clopidogrel for additional year, cont with goal LDL < 70mg/dL, interval   training with biking and follow up with Dr. Bolaños and Dr. Johnson.          Physical Examination  Review of Systems   Vitals: /76 (BP Location: Left arm, Patient Position: Sitting, Cuff Size: Adult Regular)   Pulse 50   Resp 18   Wt 91.2 kg (201 lb)   BMI 28.84 kg/m    BMI= Body mass index is 28.84 kg/m .  Wt Readings from Last 3 Encounters:   05/27/22 91.2 kg (201 lb)   01/04/22 88.5 kg (195 lb)   06/04/21 85.5 kg (188 lb 9.6 oz)       General Appearance:   no distress, normal body habitus   ENT/Mouth: membranes moist, no oral lesions or bleeding gums.      EYES:  no scleral icterus, normal conjunctivae   Neck: no carotid bruits or thyromegaly   Chest/Lungs:   lungs are clear to auscultation   Cardiovascular:   Regular. Normal first and second heart sounds with no murmurs, rubs, or gallops; the carotid, radial and posterior tibial pulses are intact, no jvd, no edema bilaterally    Abdomen:  no organomegaly, masses, bruits, or tenderness; bowel sounds are present   Extremities: no cyanosis or clubbing   Skin: no xanthelasma,  warm.    Neurologic: normal  bilateral, no tremors     Psychiatric: alert and oriented x3, calm        Please refer above for cardiac ROS details.        Medical History  Surgical History Family History Social History   Past Medical History:   Diagnosis Date     Basal cell carcinoma of skin      Cancer (H)     Kidney     Coronary artery disease      Palpitations      Paroxysmal atrial fibrillation (H)      PONV (postoperative nausea and vomiting)      Sinus bradycardia      Past Surgical History:   Procedure Laterality Date     ANGIOPLASTY  2010     ANGIOPLASTY  2012     APPENDECTOMY       ARTHROSCOPY KNEE WITH MENISCECTOMY Left 07/01/2016     CV CORONARY ANGIOGRAM N/A 6/22/2018    Procedure: Coronary Angiogram;  Surgeon: Shanique Bourgeois MD;  Location: [facility];  Service:      CV CORONARY ANGIOGRAM N/A 8/13/2020    Procedure: Coronary Angiogram;  Surgeon: Steve Neville MD;  Location: [facility];  Service: Cardiology     CV CORONARY ANGIOGRAM N/A 2/11/2021    Procedure: Coronary Angiogram;  Surgeon: Keven Monroy MD;  Location: [facility];  Service: Cardiology     CV LEFT HEART CATHETERIZATION WITH LEFT VENTRICULOGRAM N/A 6/22/2018    Procedure: Left Heart Catheterization with Left Ventriculogram;  Surgeon: Shanique Bourgeois MD;  Location: [facility];  Service:      FOOT FRACTURE SURGERY Right 02/15/2010     NEPHRECTOMY Left      REPAIR MOHS Bilateral 10/23/2019    Procedure: Mohs Reconstruction nasal, left cheek graft;  Surgeon: Alexei Stewart MD;  Location:  OR     Family History   Problem Relation Age of Onset     Hypertension Father      Cerebrovascular Disease Paternal Grandmother      Cancer No family hx of      Diabetes No family hx of      Thyroid Disease No family hx of      Glaucoma No family hx of      Macular Degeneration No family hx of      Coronary Artery Disease Father      No Known Problems Mother      No Known Problems Brother       Hypertension Sister         Social History     Socioeconomic History     Marital status:      Spouse name: Not on file     Number of children: Not on file     Years of education: Not on file     Highest education level: Not on file   Occupational History     Not on file   Tobacco Use     Smoking status: Never Smoker     Smokeless tobacco: Never Used   Substance and Sexual Activity     Alcohol use: No     Drug use: No     Sexual activity: Yes     Partners: Female   Other Topics Concern     Not on file   Social History Narrative     Not on file     Social Determinants of Health     Financial Resource Strain: Not on file   Food Insecurity: Not on file   Transportation Needs: Not on file   Physical Activity: Not on file   Stress: Not on file   Social Connections: Not on file   Intimate Partner Violence: Not on file   Housing Stability: Not on file           Medications  Allergies   Current Outpatient Medications   Medication Sig Dispense Refill     ASPIRIN EC PO Take 81 mg by mouth daily        atorvastatin (LIPITOR) 80 MG tablet Take 1 tablet (80 mg) by mouth daily 90 tablet 0     clopidogrel (PLAVIX) 75 MG tablet TAKE ONE TABLET BY MOUTH ONE TIME DAILY 90 tablet 0     multivitamin w/minerals (THERA-VIT-M) tablet Take 1 tablet by mouth daily       Nitroglycerin (NITROSTAT SL) Place 0.4 mg under the tongue       Omega-3 1000 MG capsule Take 2 g by mouth       polyethylene glycol (MIRALAX) 17 GM/Dose powder Take 17 g by mouth       sildenafil (REVATIO) 20 MG tablet Take 4 tablets (80 mg) by mouth as needed in the morning (Erectile dysfunction). Do not take within 24 hours of nitroglycerin or nitrate medication. 40 tablet 6     cholecalciferol 25 MCG (1000 UT) TABS Take 2,000 Units by mouth (Patient not taking: Reported on 5/27/2022)       vitamin E 3000 UNIT/GM CREA  (Patient not taking: Reported on 5/27/2022)       No Known Allergies       Lab Results    Chemistry/lipid CBC Cardiac Enzymes/BNP/TSH/INR    Recent Labs   Lab Test 01/04/22  1205 02/11/21  0743   CHOL  --  116   HDL  --  45   LDL 76 61   TRIG  --  49     Recent Labs   Lab Test 01/04/22  1205 02/11/21  0743 08/13/20  0846   LDL 76 61 58     Recent Labs   Lab Test 01/04/22  1205      POTASSIUM 4.6   CHLORIDE 105   CO2 27   GLC 88   BUN 13   CR 0.87   GFRESTIMATED >90   RUPAL 10.1     Recent Labs   Lab Test 01/04/22  1205 02/11/21  0743 08/14/20  0514   CR 0.87 0.81 0.78     No results for input(s): A1C in the last 01390 hours.       Recent Labs   Lab Test 01/04/22  1205   WBC 5.7   HGB 12.6*   HCT 37.3*   MCV 89        Recent Labs   Lab Test 01/04/22  1205 02/11/21  0743 08/14/20  0514   HGB 12.6* 12.4* 12.4*    Recent Labs   Lab Test 07/28/20  0805 11/07/19  2311 11/25/18  1128   TROPONINI <0.01 0.01 0.01     Recent Labs   Lab Test 11/25/18  0619   BNP 41     Recent Labs   Lab Test 11/25/18  1128   TSH 2.15     Recent Labs   Lab Test 11/07/19  2311 07/02/19  0633 11/25/18  0619   INR 0.96 1.04 0.96        Janette Johnson MD

## 2022-06-10 DIAGNOSIS — I25.10 CORONARY ARTERY DISEASE DUE TO CALCIFIED CORONARY LESION: ICD-10-CM

## 2022-06-10 DIAGNOSIS — E78.5 HYPERLIPIDEMIA LDL GOAL <130: ICD-10-CM

## 2022-06-10 DIAGNOSIS — I25.84 CORONARY ARTERY DISEASE DUE TO CALCIFIED CORONARY LESION: ICD-10-CM

## 2022-06-11 RX ORDER — ATORVASTATIN CALCIUM 80 MG/1
TABLET, FILM COATED ORAL
Qty: 90 TABLET | Refills: 1 | Status: SHIPPED | OUTPATIENT
Start: 2022-06-11 | End: 2022-11-30

## 2022-06-11 NOTE — TELEPHONE ENCOUNTER
"Last Written Prescription Date:  12/20/21  Last Fill Quantity: 90,  # refills: 0   Last office visit provider:  1/4/22     Requested Prescriptions   Pending Prescriptions Disp Refills     atorvastatin (LIPITOR) 80 MG tablet [Pharmacy Med Name: Atorvastatin Calcium Oral Tablet 80 MG] 90 tablet 0     Sig: TAKE ONE TABLET BY MOUTH ONE TIME DAILY       Statins Protocol Passed - 6/10/2022 10:15 AM        Passed - LDL on file in past 12 months     Recent Labs   Lab Test 01/04/22  1205   LDL 76             Passed - No abnormal creatine kinase in past 12 months     No lab results found.             Passed - Recent (12 mo) or future (30 days) visit within the authorizing provider's specialty     Patient has had an office visit with the authorizing provider or a provider within the authorizing providers department within the previous 12 mos or has a future within next 30 days. See \"Patient Info\" tab in inbasket, or \"Choose Columns\" in Meds & Orders section of the refill encounter.              Passed - Medication is active on med list        Passed - Patient is age 18 or older             Vonda Vasquez RN 06/11/22 10:15 AM  "

## 2022-09-01 ENCOUNTER — OFFICE VISIT (OUTPATIENT)
Dept: PODIATRY | Facility: CLINIC | Age: 67
End: 2022-09-01
Payer: COMMERCIAL

## 2022-09-01 VITALS
OXYGEN SATURATION: 97 % | BODY MASS INDEX: 28.35 KG/M2 | RESPIRATION RATE: 12 BRPM | HEART RATE: 45 BPM | WEIGHT: 197.6 LBS

## 2022-09-01 DIAGNOSIS — M72.2 PLANTAR FASCIITIS: Primary | ICD-10-CM

## 2022-09-01 PROCEDURE — 20550 NJX 1 TENDON SHEATH/LIGAMENT: CPT | Mod: LT | Performed by: PODIATRIST

## 2022-09-01 PROCEDURE — 99203 OFFICE O/P NEW LOW 30 MIN: CPT | Mod: 25 | Performed by: PODIATRIST

## 2022-09-01 RX ORDER — DEXAMETHASONE SODIUM PHOSPHATE 4 MG/ML
4 INJECTION, SOLUTION INTRA-ARTICULAR; INTRALESIONAL; INTRAMUSCULAR; INTRAVENOUS; SOFT TISSUE ONCE
Status: COMPLETED | OUTPATIENT
Start: 2022-09-01 | End: 2022-09-01

## 2022-09-01 RX ORDER — LIDOCAINE HYDROCHLORIDE 20 MG/ML
1 INJECTION, SOLUTION INFILTRATION; PERINEURAL ONCE
Status: COMPLETED | OUTPATIENT
Start: 2022-09-01 | End: 2022-09-01

## 2022-09-01 RX ADMIN — LIDOCAINE HYDROCHLORIDE 1 ML: 20 INJECTION, SOLUTION INFILTRATION; PERINEURAL at 08:15

## 2022-09-01 RX ADMIN — DEXAMETHASONE SODIUM PHOSPHATE 4 MG: 4 INJECTION, SOLUTION INTRA-ARTICULAR; INTRALESIONAL; INTRAMUSCULAR; INTRAVENOUS; SOFT TISSUE at 08:16

## 2022-09-01 ASSESSMENT — PAIN SCALES - GENERAL: PAINLEVEL: MILD PAIN (2)

## 2022-09-01 NOTE — PATIENT INSTRUCTIONS
What are Prescription Custom Orthotics?  Custom orthotics are specially-made devices designed to support and comfort your feet. Prescription orthotics are crafted for you and no one else. They match the contours of your feet precisely and are designed for the way you move. Orthotics are only manufactured after a podiatrist has conducted a complete evaluation of your feet, ankles, and legs, so the orthotic can accommodate your unique foot structure and pathology.  Prescription orthotics are divided into two categories:  Functional orthotics are designed to control abnormal motion. They may be used to treat foot pain caused by abnormal motion; they can also be used to treat injuries such as shin splints or tendinitis. Functional orthotics are usually crafted of a semi-rigid material such as plastic or graphite.  Accommodative orthotics are softer and meant to provide additional cushioning and support. They can be used to treat diabetic foot ulcers, painful calluses on the bottom of the foot, and other uncomfortable conditions.  Podiatrists use orthotics to treat foot problems such as plantar fasciitis, bursitis, tendinitis, diabetic foot ulcers, and foot, ankle, and heel pain. Clinical research studies have shown that podiatrist-prescribed foot orthotics decrease foot pain and improve function.  Orthotics typically cost more than shoe inserts purchased in a retail store, but the additional cost is usually well worth it. Unlike shoe inserts, orthotics are molded to fit each individual foot, so you can be sure that your orthotics fit and do what they're supposed to do. Prescription orthotics are also made of top-notch materials and last many years when cared for properly. Insurance often helps pay for prescription orthotics.  What are Shoe Inserts?   You've seen them at the grocery store and at the mall. You've probably even seen them on TV and online. Shoe inserts are any kind of non-prescription foot support designed  to be worn inside a shoe. Pre-packaged, mass produced, arch supports are shoe inserts. So are the  custom-made  insoles and foot supports that you can order online or at retail stores. Unless the device has been prescribed by a doctor and crafted for your specific foot, it's a shoe insert, not a custom orthotic device--despite what the ads might say.  Shoe inserts can be very helpful for a variety of foot ailments, including flat arches and foot and leg pain. They can cushion your feet, provide comfort, and support your arches, but they can't correct biomechanical foot problems or cure long-standing foot issues.  The most common types of shoe inserts are:  Arch supports: Some people have high arches. Others have low arches or flat feet. Arch supports generally have a  bumped-up  appearance and are designed to support the foot's natural arch.   Insoles: Insoles slip into your shoe to provide extra cushioning and support. Insoles are often made of gel, foam, or plastic.   Heel liners: Heel liners, sometimes called heel pads or heel cups, provide extra cushioning in the heel region. They may be especially useful for patients who have foot pain caused by age-related thinning of the heels' natural fat pads.   Foot cushions: Do your shoes rub against your heel or your toes? Foot cushions come in many different shapes and sizes and can be used as a barrier between you and your shoe.  Choosing an Over-the-Counter Shoe Insert  Selecting a shoe insert from the wide variety of devices on the market can be overwhelming. Here are some podiatrist-tested tips to help you find the insert that best meets your needs:  Consider your health. Do you have diabetes? Problems with circulation? An over-the-counter insert may not be your best bet. Diabetes and poor circulation increase your risk of foot ulcers and infections, so schedule an appointment with a podiatrist. He or she can help you select a solution that won't cause additional  health problems.   Think about the purpose. Are you planning to run a marathon, or do you just need a little arch support in your work shoes? Look for a product that fits your planned level of activity.   Bring your shoes. For the insert to be effective, it has to fit into your shoes. So bring your sneakers, dress shoes, or work boots--whatever you plan to wear with your insert. Look for an insert that will fit the contours of your shoe.   Try them on. If all possible, slip the insert into your shoe and try it out. Walk around a little. How does it feel? Don't assume that feelings of pressure will go away with continued wear. (If you can't try the inserts at the store, ask about the store's return policy and hold on to your receipt.)    Please call one of the [place] locations below to schedule an appointment. If you received a prescription please bring it with you to your appointment. Some locations are limited to what they carry.    Office Locations    [facility]  [address] [place] [address]  Home Medical Equipment, [address]   Phone: [phone]   Orthotics and Prosthetics, [address]   Phone: [phone]    [facility]  [address]  [address]   [place] [address]   Phone: [phone]    [facility]ld[facility]  [address]  [place] [address]  Phone: [phone]    [facility]   [address]  [place] [address]  Phone: [phone]    [facility]  [address]  [place] [address]  Phone: [phone]    [facility]  [address]  [place] [address]  Phone: [phone]    [place]   [address] [place] [address]   Phone: [phone]    WEARING YOUR CUSTOM FOOT ORTHOTICS   Most  insurance plans cover one pair of orthotics per year. You must check with your   insurance plan to see what your payment responsibility will be. Please call your   insurance company by calling the number on the back of your insurance card.   Orthotic's are non-refundable and non-returnable.   Orthotics are made of various designs. Some orthotics are covered with material that extends beyond your toes. If your orthotic is of this design, you will likely need to trim the toe end to get a proper fit. The insole from your shoe can be used as a template. Simply overlay the shoe insert on top of the custom orthotic. Align the heel end while tracing the length of the insert onto the custom orthotic. Use a large scissor to trim the toe end until you get a proper fit in the shoe.   The orthotic needs to be pushed as far back in the shoe as possible. The heel portion should not ride forward so as not to irritate your heel.   Orthotics are designed to work with socks. Excessive perspiration will shorten the life span of the orthotics. Remove the orthotic from the shoe frequently for proper drying.   The break-in period lasts for weeks. People new to orthotics will likely experience new aches and pains. The orthotic is forcing your foot into a new position. Arch, foot and leg muscle aches and fatigue are common during these weeks. Minor discomfort can be considered normal break in phenomenon. Start wearing your orthotic around your home your first day. Limited activity for one to two hours is recommended. You can increase one or two additional hours each day provided the aches and pains are subsiding. The degree of discomfort, fatigue and problems will dictate the speed of break in. You may require multiple weeks to work up to full time use.   Do not continue wearing your orthotics if they are creating problems such as blisters or sores. Do not hesitate to call the clinic to speak with a nurse regarding orthotic   break in,  fit, trimming, etc. You may also need to see the doctor if the orthotics are   simply not working out. Adjustments are sometimes made to improve orthotic   function.     Orthotics will only work in certain styles and types of shoes. Orthotics rarely work in dress shoes. Slip-ons, clogs, sandals and heels are particularly troublesome. Specially designed orthotics may be necessary for these types of shoes. Your custom orthotic was designed for activities that require appropriate walking or running shoes. Lace up athletic shoes, walking shoes or work boots should work appropriately. You may need a wider or longer shoe. Shoes with a removable  or insert work best. In general, you want to remove an insert from the shoe before placing the orthotic into the shoe. Shoes without a removable liner may not work as well.     When purchasing new shoes, bring your orthotics along to get a proper fit. Shop at stores that are familiar with orthotics.   Frequent washing of the orthotic may shorten the life span of the top cover. The top cover can be replaced but will generally last one to five years depending on use and foot perspiration.

## 2022-09-01 NOTE — LETTER
9/1/2022         RE: Jose Lynn  [address] [facility] [address]        Dear Colleague,    Thank you for referring your patient, Jose Lynn, to the [facility]. Please see a copy of my visit note below.    FOOT AND ANKLE SURGERY/PODIATRY CONSULT NOTE         ASSESSMENT:   Plantar fasciitis left foot      TREATMENT:  The patient was given a cortisone injection in the left heel today consisting of 1 cc of dexamethasone sodium phosphate and 1 cc of 2% lidocaine plain.  I have also recommended orthotics.  The patient is to return to the clinic if his pain persist at which time I would recommend a second cortisone injection.        HPI:Jose Lynn presented to the clinic today complaining of severe pain in the bottom of the left heel.  The patient indicated he has had this heel pain for approximately 1 month.  The pain is aggravated with weightbearing and ambulation.  He plays pickle ball and goes for long walks and these activities also aggravate his left heel.  He has tried stretching and attempt to get some relief.  His pain is moderate.  It is an aching type pain which is only partially relieved with nonweightbearing.  He denies any trauma to the left foot.  He has not had any associated redness or swelling.  The pain is more pronounced when he first gets out of bed in the mornings.  He denies any other previous treatment..      Past Medical History:   Diagnosis Date     Basal cell carcinoma of skin      Cancer (H)     Kidney     Coronary artery disease      Palpitations      Paroxysmal atrial fibrillation (H)      PONV (postoperative nausea and vomiting)      Sinus bradycardia        Social History     Socioeconomic History     Marital status:      Spouse name: Not on file     Number of children: Not on file     Years of education: Not on file     Highest education level: Not on file   Occupational History     Not on file   Tobacco Use     Smoking status: Never  Smoker     Smokeless tobacco: Never Used   Substance and Sexual Activity     Alcohol use: No     Drug use: No     Sexual activity: Yes     Partners: Female   Other Topics Concern     Not on file   Social History Narrative     Not on file     Social Determinants of Health     Financial Resource Strain: Not on file   Food Insecurity: Not on file   Transportation Needs: Not on file   Physical Activity: Not on file   Stress: Not on file   Social Connections: Not on file   Intimate Partner Violence: Not on file   Housing Stability: Not on file        No Known Allergies       Current Outpatient Medications:      ASPIRIN EC PO, Take 81 mg by mouth daily , Disp: , Rfl:      atorvastatin (LIPITOR) 80 MG tablet, TAKE ONE TABLET BY MOUTH ONE TIME DAILY, Disp: 90 tablet, Rfl: 1     clopidogrel (PLAVIX) 75 MG tablet, TAKE ONE TABLET BY MOUTH ONE TIME DAILY, Disp: 90 tablet, Rfl: 3     multivitamin w/minerals (THERA-VIT-M) tablet, Take 1 tablet by mouth daily, Disp: , Rfl:      Nitroglycerin (NITROSTAT SL), Place 0.4 mg under the tongue, Disp: , Rfl:      Omega-3 1000 MG capsule, Take 2 g by mouth, Disp: , Rfl:      polyethylene glycol (MIRALAX) 17 GM/Dose powder, Take 17 g by mouth, Disp: , Rfl:      sildenafil (REVATIO) 20 MG tablet, Take 4 tablets (80 mg) by mouth as needed in the morning (Erectile dysfunction). Do not take within 24 hours of nitroglycerin or nitrate medication., Disp: 40 tablet, Rfl: 6     cholecalciferol 25 MCG (1000 UT) TABS, Take 2,000 Units by mouth (Patient not taking: No sig reported), Disp: , Rfl:      vitamin E 3000 UNIT/GM CREA, , Disp: , Rfl:      Family History   Problem Relation Age of Onset     Hypertension Father      Cerebrovascular Disease Paternal Grandmother      Cancer No family hx of      Diabetes No family hx of      Thyroid Disease No family hx of      Glaucoma No family hx of      Macular Degeneration No family hx of      Coronary Artery Disease Father      No Known Problems Mother       No Known Problems Brother      Hypertension Sister         Social History     Socioeconomic History     Marital status:      Spouse name: Not on file     Number of children: Not on file     Years of education: Not on file     Highest education level: Not on file   Occupational History     Not on file   Tobacco Use     Smoking status: Never Smoker     Smokeless tobacco: Never Used   Substance and Sexual Activity     Alcohol use: No     Drug use: No     Sexual activity: Yes     Partners: Female   Other Topics Concern     Not on file   Social History Narrative     Not on file     Social Determinants of Health     Financial Resource Strain: Not on file   Food Insecurity: Not on file   Transportation Needs: Not on file   Physical Activity: Not on file   Stress: Not on file   Social Connections: Not on file   Intimate Partner Violence: Not on file   Housing Stability: Not on file        Review of Systems - Patient denies fever, chills, rash, wound, stiffness, limping, numbness, weakness, heart burn, blood in stool, chest pain with activity, calf pain when walking, shortness of breath with activity, chronic cough, easy bleeding/bruising, swelling of ankles, excessive thirst, fatigue, depression, anxiety.  Patient admits to left heel pain.      OBJECTIVE:  Appearance: alert, well appearing, and in no distress.    There were no vitals taken for this visit.     There is no height or weight on file to calculate BMI.     General appearance: Patient is alert and fully cooperative with history & exam.  No sign of distress is noted during the visit.  Psychiatric: Affect is pleasant & appropriate.  Patient appears motivated to improve health.  Respiratory: Breathing is regular & unlabored while sitting.  HEENT: Hearing is intact to spoken word.  Speech is clear.  No gross evidence of visual impairment that would impact ambulation.    Vascular: Dorsalis pedis and posterior tibial pulses are palpable. There is no  pedal hair  growth bilaterally.  CFT < 3 sec from anterior tibial surface to distal digits bilaterally. There is no appreciable edema noted.  Dermatologic: Turgor and texture are within normal limits. No coloration or temperature changes. No primary or secondary lesions noted.  Neurologic: All epicritic and proprioceptive sensations are grossly intact bilaterally.  Musculoskeletal: All active and passive ankle, subtalar, midtarsal, and 1st MPJ range of motion are grossly intact without pain or crepitus, with the exception of none. Manual muscle strength is within normal limits bilaterally. All dorsiflexors, plantarflexors, invertors, evertors are intact bilaterally. Tenderness present to the plantar medial aspect of the left heel on palpation.  No tenderness to bilateral feet or ankles with range of motion. Calf is soft/non-tender without warmth/induration    Imaging:       No images are attached to the encounter or orders placed in the encounter.     No results found.   No results found.           Maikol Ross DPM  [facility] Foot & Ankle Surgery/Podiatry         Again, thank you for allowing me to participate in the care of your patient.        Sincerely,        Maikol Chapman DPM

## 2022-09-01 NOTE — PROGRESS NOTES
FOOT AND ANKLE SURGERY/PODIATRY CONSULT NOTE         ASSESSMENT:   Plantar fasciitis left foot      TREATMENT:  The patient was given a cortisone injection in the left heel today consisting of 1 cc of dexamethasone sodium phosphate and 1 cc of 2% lidocaine plain.  I have also recommended orthotics.  The patient is to return to the clinic if his pain persist at which time I would recommend a second cortisone injection.        HPI:Jose Lynn presented to the clinic today complaining of severe pain in the bottom of the left heel.  The patient indicated he has had this heel pain for approximately 1 month.  The pain is aggravated with weightbearing and ambulation.  He plays pickle ball and goes for long walks and these activities also aggravate his left heel.  He has tried stretching and attempt to get some relief.  His pain is moderate.  It is an aching type pain which is only partially relieved with nonweightbearing.  He denies any trauma to the left foot.  He has not had any associated redness or swelling.  The pain is more pronounced when he first gets out of bed in the mornings.  He denies any other previous treatment..      Past Medical History:   Diagnosis Date     Basal cell carcinoma of skin      Cancer (H)     Kidney     Coronary artery disease      Palpitations      Paroxysmal atrial fibrillation (H)      PONV (postoperative nausea and vomiting)      Sinus bradycardia        Social History     Socioeconomic History     Marital status:      Spouse name: Not on file     Number of children: Not on file     Years of education: Not on file     Highest education level: Not on file   Occupational History     Not on file   Tobacco Use     Smoking status: Never Smoker     Smokeless tobacco: Never Used   Substance and Sexual Activity     Alcohol use: No     Drug use: No     Sexual activity: Yes     Partners: Female   Other Topics Concern     Not on file   Social History Narrative     Not on file     Social  Determinants of Health     Financial Resource Strain: Not on file   Food Insecurity: Not on file   Transportation Needs: Not on file   Physical Activity: Not on file   Stress: Not on file   Social Connections: Not on file   Intimate Partner Violence: Not on file   Housing Stability: Not on file        No Known Allergies       Current Outpatient Medications:      ASPIRIN EC PO, Take 81 mg by mouth daily , Disp: , Rfl:      atorvastatin (LIPITOR) 80 MG tablet, TAKE ONE TABLET BY MOUTH ONE TIME DAILY, Disp: 90 tablet, Rfl: 1     clopidogrel (PLAVIX) 75 MG tablet, TAKE ONE TABLET BY MOUTH ONE TIME DAILY, Disp: 90 tablet, Rfl: 3     multivitamin w/minerals (THERA-VIT-M) tablet, Take 1 tablet by mouth daily, Disp: , Rfl:      Nitroglycerin (NITROSTAT SL), Place 0.4 mg under the tongue, Disp: , Rfl:      Omega-3 1000 MG capsule, Take 2 g by mouth, Disp: , Rfl:      polyethylene glycol (MIRALAX) 17 GM/Dose powder, Take 17 g by mouth, Disp: , Rfl:      sildenafil (REVATIO) 20 MG tablet, Take 4 tablets (80 mg) by mouth as needed in the morning (Erectile dysfunction). Do not take within 24 hours of nitroglycerin or nitrate medication., Disp: 40 tablet, Rfl: 6     cholecalciferol 25 MCG (1000 UT) TABS, Take 2,000 Units by mouth (Patient not taking: No sig reported), Disp: , Rfl:      vitamin E 3000 UNIT/GM CREA, , Disp: , Rfl:      Family History   Problem Relation Age of Onset     Hypertension Father      Cerebrovascular Disease Paternal Grandmother      Cancer No family hx of      Diabetes No family hx of      Thyroid Disease No family hx of      Glaucoma No family hx of      Macular Degeneration No family hx of      Coronary Artery Disease Father      No Known Problems Mother      No Known Problems Brother      Hypertension Sister         Social History     Socioeconomic History     Marital status:      Spouse name: Not on file     Number of children: Not on file     Years of education: Not on file     Highest  education level: Not on file   Occupational History     Not on file   Tobacco Use     Smoking status: Never Smoker     Smokeless tobacco: Never Used   Substance and Sexual Activity     Alcohol use: No     Drug use: No     Sexual activity: Yes     Partners: Female   Other Topics Concern     Not on file   Social History Narrative     Not on file     Social Determinants of Health     Financial Resource Strain: Not on file   Food Insecurity: Not on file   Transportation Needs: Not on file   Physical Activity: Not on file   Stress: Not on file   Social Connections: Not on file   Intimate Partner Violence: Not on file   Housing Stability: Not on file        Review of Systems - Patient denies fever, chills, rash, wound, stiffness, limping, numbness, weakness, heart burn, blood in stool, chest pain with activity, calf pain when walking, shortness of breath with activity, chronic cough, easy bleeding/bruising, swelling of ankles, excessive thirst, fatigue, depression, anxiety.  Patient admits to left heel pain.      OBJECTIVE:  Appearance: alert, well appearing, and in no distress.    There were no vitals taken for this visit.     There is no height or weight on file to calculate BMI.     General appearance: Patient is alert and fully cooperative with history & exam.  No sign of distress is noted during the visit.  Psychiatric: Affect is pleasant & appropriate.  Patient appears motivated to improve health.  Respiratory: Breathing is regular & unlabored while sitting.  HEENT: Hearing is intact to spoken word.  Speech is clear.  No gross evidence of visual impairment that would impact ambulation.    Vascular: Dorsalis pedis and posterior tibial pulses are palpable. There is no  pedal hair growth bilaterally.  CFT < 3 sec from anterior tibial surface to distal digits bilaterally. There is no appreciable edema noted.  Dermatologic: Turgor and texture are within normal limits. No coloration or temperature changes. No primary or  secondary lesions noted.  Neurologic: All epicritic and proprioceptive sensations are grossly intact bilaterally.  Musculoskeletal: All active and passive ankle, subtalar, midtarsal, and 1st MPJ range of motion are grossly intact without pain or crepitus, with the exception of none. Manual muscle strength is within normal limits bilaterally. All dorsiflexors, plantarflexors, invertors, evertors are intact bilaterally. Tenderness present to the plantar medial aspect of the left heel on palpation.  No tenderness to bilateral feet or ankles with range of motion. Calf is soft/non-tender without warmth/induration    Imaging:       No images are attached to the encounter or orders placed in the encounter.     No results found.   No results found.           Maikol Ross DPM  [facility] Foot & Ankle Surgery/Podiatry

## 2022-09-10 ENCOUNTER — HEALTH MAINTENANCE LETTER (OUTPATIENT)
Age: 67
End: 2022-09-10

## 2022-10-20 ENCOUNTER — TRANSFERRED RECORDS (OUTPATIENT)
Dept: HEALTH INFORMATION MANAGEMENT | Facility: CLINIC | Age: 67
End: 2022-10-20

## 2022-11-29 DIAGNOSIS — I25.10 CORONARY ARTERY DISEASE DUE TO CALCIFIED CORONARY LESION: ICD-10-CM

## 2022-11-29 DIAGNOSIS — E78.5 HYPERLIPIDEMIA LDL GOAL <130: ICD-10-CM

## 2022-11-29 DIAGNOSIS — I25.84 CORONARY ARTERY DISEASE DUE TO CALCIFIED CORONARY LESION: ICD-10-CM

## 2022-11-30 RX ORDER — ATORVASTATIN CALCIUM 80 MG/1
TABLET, FILM COATED ORAL
Qty: 30 TABLET | Refills: 0 | Status: SHIPPED | OUTPATIENT
Start: 2022-11-30 | End: 2023-01-02

## 2022-11-30 NOTE — TELEPHONE ENCOUNTER
"Last Written Prescription Date:  6/11/22  Last Fill Quantity: 90,  # refills: 1   Last office visit provider:  1/4/22     Requested Prescriptions   Pending Prescriptions Disp Refills     atorvastatin (LIPITOR) 80 MG tablet [Pharmacy Med Name: Atorvastatin Calcium Oral Tablet 80 MG] 90 tablet 0     Sig: take one  tablet by mouth once daily       Statins Protocol Passed - 11/29/2022  9:54 PM        Passed - LDL on file in past 12 months     Recent Labs   Lab Test 01/04/22  1205   LDL 76             Passed - No abnormal creatine kinase in past 12 months     No lab results found.             Passed - Recent (12 mo) or future (30 days) visit within the authorizing provider's specialty     Patient has had an office visit with the authorizing provider or a provider within the authorizing providers department within the previous 12 mos or has a future within next 30 days. See \"Patient Info\" tab in inbasket, or \"Choose Columns\" in Meds & Orders section of the refill encounter.              Passed - Medication is active on med list        Passed - Patient is age 18 or older             Vonda Vasquez RN 11/30/22 1:17 PM  "

## 2023-01-02 DIAGNOSIS — I25.84 CORONARY ARTERY DISEASE DUE TO CALCIFIED CORONARY LESION: ICD-10-CM

## 2023-01-02 DIAGNOSIS — E78.5 HYPERLIPIDEMIA LDL GOAL <130: ICD-10-CM

## 2023-01-02 DIAGNOSIS — I25.10 CORONARY ARTERY DISEASE DUE TO CALCIFIED CORONARY LESION: ICD-10-CM

## 2023-01-02 RX ORDER — ATORVASTATIN CALCIUM 80 MG/1
80 TABLET, FILM COATED ORAL DAILY
Qty: 90 TABLET | Refills: 0 | Status: SHIPPED | OUTPATIENT
Start: 2023-01-02 | End: 2023-03-31

## 2023-01-02 NOTE — TELEPHONE ENCOUNTER
"Last Written Prescription Date:  11/30/22  Last Fill Quantity: 30,  # refills: 0   Last office visit provider:  1/4/22     Requested Prescriptions   Pending Prescriptions Disp Refills     atorvastatin (LIPITOR) 80 MG tablet [Pharmacy Med Name: Atorvastatin Calcium Oral Tablet 80 MG] 30 tablet 0     Sig: TAKE ONE TABLET BY MOUTH ONE TIME DAILY       Statins Protocol Passed - 1/2/2023  3:23 PM        Passed - LDL on file in past 12 months     Recent Labs   Lab Test 01/04/22  1205   LDL 76             Passed - No abnormal creatine kinase in past 12 months     No lab results found.             Passed - Recent (12 mo) or future (30 days) visit within the authorizing provider's specialty     Patient has had an office visit with the authorizing provider or a provider within the authorizing providers department within the previous 12 mos or has a future within next 30 days. See \"Patient Info\" tab in inbasket, or \"Choose Columns\" in Meds & Orders section of the refill encounter.              Passed - Medication is active on med list        Passed - Patient is age 18 or older             Jose Reese RN 01/02/23 3:23 PM  "

## 2023-01-03 ASSESSMENT — ENCOUNTER SYMPTOMS
DYSURIA: 0
WEAKNESS: 0
SHORTNESS OF BREATH: 0
NERVOUS/ANXIOUS: 0
PARESTHESIAS: 0
CONSTIPATION: 0
DIZZINESS: 0
ABDOMINAL PAIN: 0
SORE THROAT: 0
HEMATOCHEZIA: 0
FREQUENCY: 0
PALPITATIONS: 0
JOINT SWELLING: 0
EYE PAIN: 0
CHILLS: 0
HEARTBURN: 0
HEADACHES: 0
COUGH: 0
NAUSEA: 0
ARTHRALGIAS: 0
HEMATURIA: 0
FEVER: 0
DIARRHEA: 0
MYALGIAS: 0

## 2023-01-03 ASSESSMENT — ACTIVITIES OF DAILY LIVING (ADL): CURRENT_FUNCTION: NO ASSISTANCE NEEDED

## 2023-01-04 ENCOUNTER — OFFICE VISIT (OUTPATIENT)
Dept: INTERNAL MEDICINE | Facility: CLINIC | Age: 68
End: 2023-01-04
Payer: COMMERCIAL

## 2023-01-04 VITALS
RESPIRATION RATE: 16 BRPM | WEIGHT: 200.4 LBS | HEIGHT: 70 IN | HEART RATE: 60 BPM | SYSTOLIC BLOOD PRESSURE: 110 MMHG | OXYGEN SATURATION: 97 % | BODY MASS INDEX: 28.69 KG/M2 | DIASTOLIC BLOOD PRESSURE: 72 MMHG

## 2023-01-04 DIAGNOSIS — I25.10 CORONARY ARTERY DISEASE DUE TO CALCIFIED CORONARY LESION: ICD-10-CM

## 2023-01-04 DIAGNOSIS — Z00.00 ENCOUNTER FOR WELLNESS EXAMINATION IN ADULT: Primary | ICD-10-CM

## 2023-01-04 DIAGNOSIS — I25.84 CORONARY ARTERY DISEASE DUE TO CALCIFIED CORONARY LESION: ICD-10-CM

## 2023-01-04 DIAGNOSIS — Z86.0100 HISTORY OF COLONIC POLYPS: ICD-10-CM

## 2023-01-04 DIAGNOSIS — Z85.828 HISTORY OF BASAL CELL CARCINOMA (BCC) OF SKIN: ICD-10-CM

## 2023-01-04 DIAGNOSIS — Z51.81 ENCOUNTER FOR THERAPEUTIC DRUG MONITORING: ICD-10-CM

## 2023-01-04 DIAGNOSIS — Z12.5 SCREENING FOR PROSTATE CANCER: ICD-10-CM

## 2023-01-04 LAB
ALBUMIN SERPL BCG-MCNC: 4.2 G/DL (ref 3.5–5.2)
ALP SERPL-CCNC: 82 U/L (ref 40–129)
ALT SERPL W P-5'-P-CCNC: 35 U/L (ref 10–50)
ANION GAP SERPL CALCULATED.3IONS-SCNC: 11 MMOL/L (ref 7–15)
AST SERPL W P-5'-P-CCNC: 32 U/L (ref 10–50)
BILIRUB SERPL-MCNC: 0.5 MG/DL
BUN SERPL-MCNC: 14.7 MG/DL (ref 8–23)
CALCIUM SERPL-MCNC: 9.6 MG/DL (ref 8.8–10.2)
CHLORIDE SERPL-SCNC: 105 MMOL/L (ref 98–107)
CHOLEST SERPL-MCNC: 160 MG/DL
CREAT SERPL-MCNC: 0.95 MG/DL (ref 0.67–1.17)
DEPRECATED HCO3 PLAS-SCNC: 24 MMOL/L (ref 22–29)
ERYTHROCYTE [DISTWIDTH] IN BLOOD BY AUTOMATED COUNT: 13 % (ref 10–15)
GFR SERPL CREATININE-BSD FRML MDRD: 87 ML/MIN/1.73M2
GLUCOSE SERPL-MCNC: 97 MG/DL (ref 70–99)
HCT VFR BLD AUTO: 35.5 % (ref 40–53)
HDLC SERPL-MCNC: 48 MG/DL
HGB BLD-MCNC: 11.7 G/DL (ref 13.3–17.7)
LDLC SERPL CALC-MCNC: 97 MG/DL
MCH RBC QN AUTO: 28.4 PG (ref 26.5–33)
MCHC RBC AUTO-ENTMCNC: 33 G/DL (ref 31.5–36.5)
MCV RBC AUTO: 86 FL (ref 78–100)
NONHDLC SERPL-MCNC: 112 MG/DL
PLATELET # BLD AUTO: 183 10E3/UL (ref 150–450)
POTASSIUM SERPL-SCNC: 4.5 MMOL/L (ref 3.4–5.3)
PROT SERPL-MCNC: 6.7 G/DL (ref 6.4–8.3)
RBC # BLD AUTO: 4.12 10E6/UL (ref 4.4–5.9)
SODIUM SERPL-SCNC: 140 MMOL/L (ref 136–145)
TRIGL SERPL-MCNC: 76 MG/DL
WBC # BLD AUTO: 4.8 10E3/UL (ref 4–11)

## 2023-01-04 PROCEDURE — 80061 LIPID PANEL: CPT | Performed by: INTERNAL MEDICINE

## 2023-01-04 PROCEDURE — 36415 COLL VENOUS BLD VENIPUNCTURE: CPT | Performed by: INTERNAL MEDICINE

## 2023-01-04 PROCEDURE — 85027 COMPLETE CBC AUTOMATED: CPT | Performed by: INTERNAL MEDICINE

## 2023-01-04 PROCEDURE — 80053 COMPREHEN METABOLIC PANEL: CPT | Performed by: INTERNAL MEDICINE

## 2023-01-04 PROCEDURE — 99397 PER PM REEVAL EST PAT 65+ YR: CPT | Performed by: INTERNAL MEDICINE

## 2023-01-04 PROCEDURE — G0103 PSA SCREENING: HCPCS | Performed by: INTERNAL MEDICINE

## 2023-01-04 ASSESSMENT — ENCOUNTER SYMPTOMS
ARTHRALGIAS: 0
HEMATOCHEZIA: 0
HEADACHES: 0
DIZZINESS: 0
HEARTBURN: 0
SORE THROAT: 0
NERVOUS/ANXIOUS: 0
HEMATURIA: 0
COUGH: 0
CHILLS: 0
PALPITATIONS: 0
DYSURIA: 0
FEVER: 0
SHORTNESS OF BREATH: 0
MYALGIAS: 0
WEAKNESS: 0
FREQUENCY: 0
ABDOMINAL PAIN: 0
NAUSEA: 0
EYE PAIN: 0
CONSTIPATION: 0
DIARRHEA: 0
PARESTHESIAS: 0
JOINT SWELLING: 0

## 2023-01-04 ASSESSMENT — ACTIVITIES OF DAILY LIVING (ADL): CURRENT_FUNCTION: NO ASSISTANCE NEEDED

## 2023-01-04 NOTE — PATIENT INSTRUCTIONS
No change in medication treatment plan.    Continue to stay active with regular exercise on a regular basis.  20 to 30 minutes of aerobic type exercise is recommended at least 4 times a week.    I would recommend a flu shot, which can be obtained at local pharmacy.  You can also consider the new COVID-vaccine.    See dermatology this winter as planned for yearly skin exam.    For colonoscopy will be due July 2024.    Routine yearly eye exam is recommended for glaucoma and retina screening.    Follow-up in 1 year for physical exam.

## 2023-01-04 NOTE — PROGRESS NOTES
SUBJECTIVE:   Jose is a 67 year old who presents for Preventive Visit.    67-year-old male lives independently with wife.  Enjoys grandkids.  Still drives a schoolbus.    He is active on a regular basis.  Does exercise bike.  Enjoys snowshoeing and walking.  He is active almost every day.  Tries to do 20 to 30 minutes on the exercise bike.  Good exertional ability.    At peak exercise he has some shortness of breath, but has not been worsening.  No chest pain or chest pressure.  No palpitations or fainting spells.    Coronary artery disease with a drug-eluting stent to the LAD in 2010.  Drug-eluting stent to the OM in 2012.  PTCA of the RCA in 2020.  Catheterization February 2021 showed patent stents and other arteries 40-75% and no new intervention.    History of inferior septal MI    He continues on aspirin and Plavix.  I did review the cardiology note from May 2022 with recommendation to continue Plavix long-term.  He has not had any bleeding issues.  No blood in stool or melena.  No falls.    On Lipitor 80 mg.  No new generalized myalgias.  No history of liver problems.  January 2022 LDL 76 with normal liver test.    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    His blood pressures been normal without medication.    Is never smoked.  No mouth sores or swallowing difficulty.  No cough.    Paroxysmal atrial fibrillation 2018 but without recurrence.  No palpitations.  No history of alcohol or sleep apnea.    Urination is normal, just 1 time a night nocturia.  PSA level normal last year.    July 2019 colonoscopy with a 3 mm tubular adenoma, 5-year follow-up plan.    History of basal cell cancer with Mohs in 2019.  No new skin changes.  Has an appointment this winter with dermatology.    Ophthalmology last year and denies any vision changes or problems.  No new headaches.    He has a wart on his left foot, has been treating over-the-counter topical and it has been reducing in  "size.    Denies family history of diabetes        Patient has been advised of split billing requirements and indicates understanding: Yes  Are you in the first 12 months of your Medicare coverage?  No    Healthy Habits:     In general, how would you rate your overall health?  Excellent    Frequency of exercise:  6-7 days/week    Duration of exercise:  15-30 minutes    Do you usually eat at least 4 servings of fruit and vegetables a day, include whole grains    & fiber and avoid regularly eating high fat or \"junk\" foods?  Yes    Taking medications regularly:  No    Barriers to taking medications:  None    Medication side effects:  None    Ability to successfully perform activities of daily living:  No assistance needed    Home Safety:  No safety concerns identified    Hearing Impairment:  No hearing concerns    In the past 6 months, have you been bothered by leaking of urine?  No    In general, how would you rate your overall mental or emotional health?  Excellent      PHQ-2 Total Score: 0    Additional concerns today:  No      Have you ever done Advance Care Planning? (For example, a Health Directive, POLST, or a discussion with a medical provider or your loved ones about your wishes): Yes, patient states has an Advance Care Planning document and will bring a copy to the clinic.       Fall risk  Fallen 2 or more times in the past year?: No  Any fall with injury in the past year?: Yes    Cognitive Screening   1) Repeat 3 items (Leader, Season, Table)    2) Clock draw: NORMAL  3) 3 item recall: Recalls 3 objects  Results: 3 items recalled: COGNITIVE IMPAIRMENT LESS LIKELY    Mini-CogTM Copyright HILTON Alcaraz. Licensed by the author for use in [facility]; reprinted with permission ([email or web address]). All rights reserved.      Do you have sleep apnea, excessive snoring or daytime drowsiness?: no    Reviewed and updated as needed this visit by clinical staff   Tobacco  Allergies               Reviewed and updated " as needed this visit by Provider                 Social History     Tobacco Use     Smoking status: Never     Smokeless tobacco: Never   Substance Use Topics     Alcohol use: No         Alcohol Use 1/3/2023   Prescreen: >3 drinks/day or >7 drinks/week? Not Applicable   Prescreen: >3 drinks/day or >7 drinks/week? -         Current providers sharing in care for this patient include:   Patient Care Team:  Aki Bolaños MD as PCP - General (Internal Medicine)  Janette Johnson MD as Assigned Heart and Vascular Provider  Aki Bolaños MD as Assigned PCP  Maikol Chapman DPM as Assigned Musculoskeletal Provider    The following health maintenance items are reviewed in Epic and correct as of today:  Health Maintenance   Topic Date Due     ANNUAL REVIEW OF  ORDERS  Never done     EYE EXAM  11/07/2017     COVID-19 Vaccine (3 - Booster for Moderna series) 05/08/2021     INFLUENZA VACCINE (1) 09/01/2022     MEDICARE ANNUAL WELLNESS VISIT  01/04/2023     FALL RISK ASSESSMENT  01/04/2024     LIPID  02/11/2026     ADVANCE CARE PLANNING  01/04/2027     COLORECTAL CANCER SCREENING  07/19/2029     DTAP/TDAP/TD IMMUNIZATION (3 - Td or Tdap) 10/15/2032     HEPATITIS C SCREENING  Completed     PHQ-2 (once per calendar year)  Completed     Pneumococcal Vaccine: 65+ Years  Completed     ZOSTER IMMUNIZATION  Completed     AORTIC ANEURYSM SCREENING (SYSTEM ASSIGNED)  Completed     IPV IMMUNIZATION  Aged Out     MENINGITIS IMMUNIZATION  Aged Out     Lab work is in process  Patient Active Problem List   Diagnosis     Dyslipidemia, goal LDL below 70     Coronary Artery Disease     Kidney Cancer     Basal Cell Carcinoma Of The Skin     Palpitations     Paroxysmal atrial fibrillation (H)     Sinus bradycardia     Abnormal stress test     Benign neoplasm of ascending colon     Diaphragmatic hernia     Disorder of function of stomach     Diverticular disease of large intestine     Gastric ulcer without hemorrhage or perforation     Polyp  of colon     Past Surgical History:   Procedure Laterality Date     ANGIOPLASTY  2010     ANGIOPLASTY  2012     APPENDECTOMY       ARTHROSCOPY KNEE WITH MENISCECTOMY Left 07/01/2016     CV CORONARY ANGIOGRAM N/A 6/22/2018    Procedure: Coronary Angiogram;  Surgeon: Shanique Bourgeois MD;  Location: [facility];  Service:      CV CORONARY ANGIOGRAM N/A 8/13/2020    Procedure: Coronary Angiogram;  Surgeon: Steve Neville MD;  Location: [facility] Lab;  Service: Cardiology     CV CORONARY ANGIOGRAM N/A 2/11/2021    Procedure: Coronary Angiogram;  Surgeon: Keven Monroy MD;  Location: [facility] Cardiac Cath Lab;  Service: Cardiology     CV LEFT HEART CATHETERIZATION WITH LEFT VENTRICULOGRAM N/A 6/22/2018    Procedure: Left Heart Catheterization with Left Ventriculogram;  Surgeon: Shanique Bourgeois MD;  Location: [facility] Lab;  Service:      FOOT FRACTURE SURGERY Right 02/15/2010     NEPHRECTOMY Left      REPAIR MOHS Bilateral 10/23/2019    Procedure: Mohs Reconstruction nasal, left cheek graft;  Surgeon: Alexei Stewart MD;  Location:  OR       Social History     Tobacco Use     Smoking status: Never     Smokeless tobacco: Never   Substance Use Topics     Alcohol use: No     Family History   Problem Relation Age of Onset     Hypertension Father      Cerebrovascular Disease Paternal Grandmother      Cancer No family hx of      Diabetes No family hx of      Thyroid Disease No family hx of      Glaucoma No family hx of      Macular Degeneration No family hx of      Coronary Artery Disease Father      No Known Problems Mother      No Known Problems Brother      Hypertension Sister          Current Outpatient Medications   Medication Sig Dispense Refill     ASPIRIN EC PO Take 81 mg by mouth daily        atorvastatin (LIPITOR) 80 MG tablet Take 1 tablet (80 mg) by mouth daily 90 tablet 0     cholecalciferol 25 MCG (1000 UT) TABS Take 2,000 Units by mouth       clopidogrel (PLAVIX) 75 MG  "tablet TAKE ONE TABLET BY MOUTH ONE TIME DAILY 90 tablet 3     multivitamin w/minerals (THERA-VIT-M) tablet Take 1 tablet by mouth daily       Nitroglycerin (NITROSTAT SL) Place 0.4 mg under the tongue       Omega-3 1000 MG capsule Take 2 g by mouth as needed       polyethylene glycol (MIRALAX) 17 GM/Dose powder Take 17 g by mouth daily       sildenafil (REVATIO) 20 MG tablet Take 4 tablets (80 mg) by mouth as needed in the morning (Erectile dysfunction). Do not take within 24 hours of nitroglycerin or nitrate medication. 40 tablet 6     No Known Allergies      Review of Systems   Constitutional: Negative for chills and fever.   HENT: Negative for congestion, ear pain, hearing loss and sore throat.    Eyes: Negative for pain and visual disturbance.   Respiratory: Negative for cough and shortness of breath.    Cardiovascular: Negative for chest pain, palpitations and peripheral edema.   Gastrointestinal: Negative for abdominal pain, constipation, diarrhea, heartburn, hematochezia and nausea.   Genitourinary: Positive for impotence. Negative for dysuria, frequency, genital sores, hematuria, penile discharge and urgency.   Musculoskeletal: Negative for arthralgias, joint swelling and myalgias.   Skin: Negative for rash.   Neurological: Negative for dizziness, weakness, headaches and paresthesias.   Psychiatric/Behavioral: Negative for mood changes. The patient is not nervous/anxious.          OBJECTIVE:   /72 (BP Location: Right arm, Patient Position: Sitting, Cuff Size: Adult Regular)   Pulse 60   Resp 16   Ht 1.778 m (5' 10\")   Wt 90.9 kg (200 lb 6.4 oz)   SpO2 97%   BMI 28.75 kg/m   Estimated body mass index is 28.75 kg/m  as calculated from the following:    Height as of this encounter: 1.778 m (5' 10\").    Weight as of this encounter: 90.9 kg (200 lb 6.4 oz).  Physical Exam  Healthy-appearing male.  Alert and oriented x3.  Good mood and affect.  Clock face drawing normal.  Mobility exam normal.  Word " recall normal.  Pupils irises equal and reactive.  Extraocular muscles intact.  No jaundice or conjunctivitis.  External ears and nose exam is normal.  Wears hearing aids.  Minimal cerumen and normal tympanic membranes.  Pharynx is normal and not crowded.  No oral lesions.  Teeth in good condition.  No cervical or supraclavicular or axillary adenopathy.  No JVD and no carotid bruits.  No thyromegaly or nodularity.  Lungs are clear to auscultation with good respiratory excursion.  Heart is regular without murmur rub or gallop.  +1 pedal pulses and no ankle edema.  Feet in good condition.  He does have a 1 cm verrucous nodule on his left plantar surface consistent with wart.  Abdomen is thin nontender no hepatosplenomegaly.  No pulsatile abdominal mass.  Skin exam without suspicious skin lesions.  He declined  exam    ASSESSMENT / PLAN:   (Z00.00) Encounter for wellness examination in adult  (primary encounter diagnosis)  Comment: Patient's main health maintenance focus is preventing new cardiac event.    I stressed the importance of regular exercise which she is doing well with.  Diet is good.    He did not want to get the new COVID-vaccine, although I did recommend that the patient.  He plans to get the flu shot at local pharmacy, which I did recommend to get that soon.  Plan: Routine eye exam is recommended yearly.        (I25.10,  I25.84) Coronary artery disease due to calcified coronary lesion  Comment: Asymptomatic with good exercise tolerance.  Continue Plavix long-term.  Continue aspirin and Lipitor 80 mg.  Goal LDL less than 80.  Plan: Lipid Profile            (Z86.010) History of colonic polyps  Comment:   Plan: No bowel changes.  5-year colonoscopy due July 2024    (Z85.828) History of basal cell carcinoma (BCC) of skin  Comment: No evidence of recurrence  Plan: Has an appointment for full body skin exam with dermatology this winter    (Z12.5) Screening for prostate cancer  Comment: Yearly  "screening  Plan: Prostate Specific Antigen Screen            (Z51.81) Encounter for therapeutic drug monitoring  Comment:   Plan: CBC with platelets, Comprehensive metabolic         panel          Left foot wart, using over-the-counter topical    Patient states he is not on Medicare, still with insurance through the school district.  Patient was told visit would be coded as health maintenance preventative exam.      COUNSELING:  Reviewed preventive health counseling, as reflected in patient instructions       Regular exercise       Healthy diet/nutrition       Vision screening       Colon cancer screening       Prostate cancer screening      BMI:   Estimated body mass index is 28.75 kg/m  as calculated from the following:    Height as of this encounter: 1.778 m (5' 10\").    Weight as of this encounter: 90.9 kg (200 lb 6.4 oz).         He reports that he has never smoked. He has never used smokeless tobacco.      Appropriate preventive services were discussed with this patient, including applicable screening as appropriate for cardiovascular disease, diabetes, osteopenia/osteoporosis, and glaucoma.  As appropriate for age/gender, discussed screening for colorectal cancer, prostate cancer, breast cancer, and cervical cancer. Checklist reviewing preventive services available has been given to the patient.    Reviewed patients plan of care and provided an AVS. The Basic Care Plan (routine screening as documented in Health Maintenance) for Jose meets the Care Plan requirement. This Care Plan has been established and reviewed with the Patient.          Aki Bolaños MD  [facility]    Identified Health Risks:  "

## 2023-01-06 LAB — PSA SERPL-MCNC: 0.12 NG/ML (ref 0–4.5)

## 2023-03-30 DIAGNOSIS — I25.10 CORONARY ARTERY DISEASE DUE TO CALCIFIED CORONARY LESION: ICD-10-CM

## 2023-03-30 DIAGNOSIS — E78.5 HYPERLIPIDEMIA LDL GOAL <130: ICD-10-CM

## 2023-03-30 DIAGNOSIS — I25.84 CORONARY ARTERY DISEASE DUE TO CALCIFIED CORONARY LESION: ICD-10-CM

## 2023-03-31 RX ORDER — ATORVASTATIN CALCIUM 80 MG/1
80 TABLET, FILM COATED ORAL DAILY
Qty: 90 TABLET | Refills: 3 | Status: SHIPPED | OUTPATIENT
Start: 2023-03-31 | End: 2023-10-18 | Stop reason: ALTCHOICE

## 2023-03-31 NOTE — TELEPHONE ENCOUNTER
"Last Written Prescription Date:  1/2/23  Last Fill Quantity: 90,  # refills: 0   Last office visit provider:  1/4/23     Requested Prescriptions   Pending Prescriptions Disp Refills     atorvastatin (LIPITOR) 80 MG tablet 90 tablet 0     Sig: Take 1 tablet (80 mg) by mouth daily       Statins Protocol Passed - 3/30/2023 10:50 AM        Passed - LDL on file in past 12 months     Recent Labs   Lab Test 01/04/23  1118   LDL 97             Passed - No abnormal creatine kinase in past 12 months     No lab results found.             Passed - Recent (12 mo) or future (30 days) visit within the authorizing provider's specialty     Patient has had an office visit with the authorizing provider or a provider within the authorizing providers department within the previous 12 mos or has a future within next 30 days. See \"Patient Info\" tab in inbasket, or \"Choose Columns\" in Meds & Orders section of the refill encounter.              Passed - Medication is active on med list        Passed - Patient is age 18 or older             SHERWIN CABELLO RN 03/31/23 11:51 AM  "

## 2023-04-13 DIAGNOSIS — N52.9 ERECTILE DYSFUNCTION, UNSPECIFIED ERECTILE DYSFUNCTION TYPE: ICD-10-CM

## 2023-04-13 RX ORDER — SILDENAFIL CITRATE 20 MG/1
80 TABLET ORAL DAILY PRN
Qty: 40 TABLET | Refills: 6 | Status: SHIPPED | OUTPATIENT
Start: 2023-04-13 | End: 2024-05-09

## 2023-08-25 DIAGNOSIS — I25.10 CORONARY ATHEROSCLEROSIS: ICD-10-CM

## 2023-08-25 RX ORDER — CLOPIDOGREL BISULFATE 75 MG/1
TABLET ORAL
Qty: 90 TABLET | Refills: 0 | Status: SHIPPED | OUTPATIENT
Start: 2023-08-25 | End: 2023-12-06

## 2023-09-02 ENCOUNTER — TRANSFERRED RECORDS (OUTPATIENT)
Dept: HEALTH INFORMATION MANAGEMENT | Facility: CLINIC | Age: 68
End: 2023-09-02
Payer: COMMERCIAL

## 2023-09-05 ENCOUNTER — OFFICE VISIT (OUTPATIENT)
Dept: CARDIOLOGY | Facility: CLINIC | Age: 68
End: 2023-09-05
Payer: COMMERCIAL

## 2023-09-05 VITALS
DIASTOLIC BLOOD PRESSURE: 82 MMHG | BODY MASS INDEX: 29.41 KG/M2 | WEIGHT: 205 LBS | OXYGEN SATURATION: 98 % | SYSTOLIC BLOOD PRESSURE: 151 MMHG | RESPIRATION RATE: 16 BRPM | HEART RATE: 50 BPM

## 2023-09-05 DIAGNOSIS — I25.10 CORONARY ARTERY DISEASE INVOLVING NATIVE CORONARY ARTERY OF NATIVE HEART WITHOUT ANGINA PECTORIS: Primary | ICD-10-CM

## 2023-09-05 PROCEDURE — 99214 OFFICE O/P EST MOD 30 MIN: CPT | Performed by: INTERNAL MEDICINE

## 2023-09-05 NOTE — LETTER
9/5/2023    Aki Bolaños MD  [address] [place] [address]    RE: Jose Lynn       Dear Colleague,     I had the pleasure of seeing Jose Lynn in the [facility].    HEART CARE ENCOUNTER CONSULTATON NOTE      [facility]  [phone]      Assessment/Recommendations   Assessment:  1.  Coronary artery disease: History of PCI of LAD, OM with known moderate distal LAD and moderate to severe distal RCA disease.  Patient was experiencing exertional angina and underwent angiogram on 8/13 with severe RPDA disease treated with cutting angioplasty.  Residual disease RPL/D RCA that was FFR negative.  Patient underwent nuclear stress test and had marked EKG changes.    He underwent repeat coronary angiogram 2021that showed mild to moderate disease diffusely negative for FFR.  No anginal complaints.  2.  Atrial fibrillation: 1 isolated episode of atrial fibrillation.  Continue on aspirin.    3.  Dyslipidemia: High-dose statin therapy.  LDL is not within goals.  Repeat fasting lipids in 1 to 2 months with improved diet.  May need to consider changing to Crestor 40 mg daily.     Plan:  1.  Continue dual antiplatelet therapy.  Continue aspirin and Plavix indefinitely if tolerates  2.  Continue Lipitor 80 mg daily.  Repeat fasting lipid in 1 to 2 months.  Consider changing to Crestor 40 mg daily if LDL still not less than 70.  3.  Follow-up in a year         History of Present Illness/Subjective    HPI: Jose Lynn is a 68 year old male ith history of coronary artery disease status post PCI of LAD in 2010, PCI of OM 2012, status post cutting balloon angioplasty of RPDA in August 2020 with known moderate disease that is FFR negative and RPL/distal RCA.    Last angiogram done 2021 after an abnormal stress test showed mild to moderate disease FFR negative in first RPL.  He exercises regularly.  He does a lot of biking.  Has not noted any problems with chest pain or breathing  difficulty.  He admits that he eats a diet high in dairy.       Physical Examination  Review of Systems   Vitals: BP (!) 151/82 (BP Location: Left arm, Patient Position: Sitting, Cuff Size: Adult Large)   Pulse 50   Resp 16   Wt 93 kg (205 lb)   SpO2 98%   BMI 29.41 kg/m    BMI= Body mass index is 29.41 kg/m .  Wt Readings from Last 3 Encounters:   09/05/23 93 kg (205 lb)   01/04/23 90.9 kg (200 lb 6.4 oz)   09/01/22 89.6 kg (197 lb 9.6 oz)       General Appearance:   no distress, normal body habitus   ENT/Mouth: membranes moist, no oral lesions or bleeding gums.      EYES:  no scleral icterus, normal conjunctivae   Neck: no carotid bruits or thyromegaly   Chest/Lungs:   lungs are clear to auscultation   Cardiovascular:   Regular. Normal first and second heart sounds with no murmur no edema bilaterally        Extremities: no cyanosis or clubbing   Skin: no xanthelasma, warm.    Neurologic: normal  bilateral, no tremors     Psychiatric: alert and oriented x3, calm        Please refer above for cardiac ROS details.        Medical History  Surgical History Family History Social History   Past Medical History:   Diagnosis Date    Basal cell carcinoma of skin     Cancer (H)     Kidney    Coronary artery disease     Palpitations     Paroxysmal atrial fibrillation (H)     PONV (postoperative nausea and vomiting)     Sinus bradycardia      Past Surgical History:   Procedure Laterality Date    ANGIOPLASTY  2010    ANGIOPLASTY  2012    APPENDECTOMY      ARTHROSCOPY KNEE WITH MENISCECTOMY Left 07/01/2016    CV CORONARY ANGIOGRAM N/A 6/22/2018    Procedure: Coronary Angiogram;  Surgeon: Shanique Bourgeois MD;  Location: [facility] Lab;  Service:     CV CORONARY ANGIOGRAM N/A 8/13/2020    Procedure: Coronary Angiogram;  Surgeon: Steve Neville MD;  Location: [facility] Cath Lab;  Service: Cardiology    CV CORONARY ANGIOGRAM N/A 2/11/2021    Procedure: Coronary Angiogram;  Surgeon: Keven Monroy MD;   Location: [facility] Cardiac Cath Lab;  Service: Cardiology    CV LEFT HEART CATHETERIZATION WITH LEFT VENTRICULOGRAM N/A 6/22/2018    Procedure: Left Heart Catheterization with Left Ventriculogram;  Surgeon: Shanique Bourgeois MD;  Location: [facility] Lab;  Service:     FOOT FRACTURE SURGERY Right 02/15/2010    NEPHRECTOMY Left     REPAIR MOHS Bilateral 10/23/2019    Procedure: Mohs Reconstruction nasal, left cheek graft;  Surgeon: Alexei Stewart MD;  Location: [facility] OR     Family History   Problem Relation Age of Onset    Hypertension Father     Cerebrovascular Disease Paternal Grandmother     Cancer No family hx of     Diabetes No family hx of     Thyroid Disease No family hx of     Glaucoma No family hx of     Macular Degeneration No family hx of     Coronary Artery Disease Father     No Known Problems Mother     No Known Problems Brother     Hypertension Sister         Social History     Socioeconomic History    Marital status:      Spouse name: Not on file    Number of children: Not on file    Years of education: Not on file    Highest education level: Not on file   Occupational History    Not on file   Tobacco Use    Smoking status: Never    Smokeless tobacco: Never   Substance and Sexual Activity    Alcohol use: No    Drug use: No    Sexual activity: Yes     Partners: Female   Other Topics Concern    Not on file   Social History Narrative    Not on file     Social Determinants of Health     Financial Resource Strain: Not on file   Food Insecurity: Not on file   Transportation Needs: Not on file   Physical Activity: Not on file   Stress: Not on file   Social Connections: Not on file   Intimate Partner Violence: Not on file   Housing Stability: Not on file           Medications  Allergies   Current Outpatient Medications   Medication Sig Dispense Refill    ASPIRIN EC PO Take 81 mg by mouth daily       atorvastatin (LIPITOR) 80 MG tablet Take 1 tablet (80 mg) by mouth daily 90 tablet 3     cholecalciferol 25 MCG (1000 UT) TABS Take 2,000 Units by mouth      clopidogrel (PLAVIX) 75 MG tablet TAKE ONE TABLET BY MOUTH ONE TIME DAILY 90 tablet 0    multivitamin w/minerals (THERA-VIT-M) tablet Take 1 tablet by mouth daily      Nitroglycerin (NITROSTAT SL) Place 0.4 mg under the tongue      nitroGLYcerin (NITROSTAT) 0.4 MG sublingual tablet DISSOLVE ONE TABLET UNDER TONGUE EVERY 5 MINUTES IF NEEDED FOR CHEST PAIN 25 tablet 0    Omega-3 1000 MG capsule Take 2 g by mouth as needed      polyethylene glycol (MIRALAX) 17 GM/Dose powder Take 17 g by mouth daily      sildenafil (REVATIO) 20 MG tablet Take 4 tablets (80 mg) by mouth daily as needed (Erectile dysfunction) Do not take within 24 hours of nitroglycerin or nitrate medication. 40 tablet 6     No Known Allergies       Lab Results    Chemistry/lipid CBC Cardiac Enzymes/BNP/TSH/INR   Recent Labs   Lab Test 01/04/23  1118   CHOL 160   HDL 48   LDL 97   TRIG 76     Recent Labs   Lab Test 01/04/23  1118 01/04/22  1205 02/11/21  0743   LDL 97 76 61     Recent Labs   Lab Test 01/04/23  1118      POTASSIUM 4.5   CHLORIDE 105   CO2 24   GLC 97   BUN 14.7   CR 0.95   GFRESTIMATED 87   RUPAL 9.6     Recent Labs   Lab Test 01/04/23  1118 01/04/22  1205 02/11/21  0743   CR 0.95 0.87 0.81     No results for input(s): A1C in the last 01855 hours.       Recent Labs   Lab Test 01/04/23  1118   WBC 4.8   HGB 11.7*   HCT 35.5*   MCV 86        Recent Labs   Lab Test 01/04/23  1118 01/04/22  1205 02/11/21  0743   HGB 11.7* 12.6* 12.4*    Recent Labs   Lab Test 07/28/20  0805 11/07/19  2311 11/25/18  1128   TROPONINI <0.01 0.01 0.01     Recent Labs   Lab Test 11/25/18  0619   BNP 41     Recent Labs   Lab Test 11/25/18  1128   TSH 2.15     Recent Labs   Lab Test 11/07/19  2311 07/02/19  0633 11/25/18  0619   INR 0.96 1.04 0.96        Janette Johnson MD      Thank you for allowing me to participate in the care of your patient.      Sincerely,     Janette Johnson MD      [facility]  cc:   No referring provider defined for this encounter.

## 2023-09-05 NOTE — PROGRESS NOTES
HEART CARE ENCOUNTER CONSULTATON NOTE      [facility]  [phone]      Assessment/Recommendations   Assessment:  1.  Coronary artery disease: History of PCI of LAD, OM with known moderate distal LAD and moderate to severe distal RCA disease.  Patient was experiencing exertional angina and underwent angiogram on 8/13 with severe RPDA disease treated with cutting angioplasty.  Residual disease RPL/D RCA that was FFR negative.  Patient underwent nuclear stress test and had marked EKG changes.    He underwent repeat coronary angiogram 2021that showed mild to moderate disease diffusely negative for FFR.  No anginal complaints.  2.  Atrial fibrillation: 1 isolated episode of atrial fibrillation.  Continue on aspirin.    3.  Dyslipidemia: High-dose statin therapy.  LDL is not within goals.  Repeat fasting lipids in 1 to 2 months with improved diet.  May need to consider changing to Crestor 40 mg daily.     Plan:  1.  Continue dual antiplatelet therapy.  Continue aspirin and Plavix indefinitely if tolerates  2.  Continue Lipitor 80 mg daily.  Repeat fasting lipid in 1 to 2 months.  Consider changing to Crestor 40 mg daily if LDL still not less than 70.  3.  Follow-up in a year         History of Present Illness/Subjective    HPI: Jose Lynn is a 68 year old male ith history of coronary artery disease status post PCI of LAD in 2010, PCI of OM 2012, status post cutting balloon angioplasty of RPDA in August 2020 with known moderate disease that is FFR negative and RPL/distal RCA.    Last angiogram done 2021 after an abnormal stress test showed mild to moderate disease FFR negative in first RPL.  He exercises regularly.  He does a lot of biking.  Has not noted any problems with chest pain or breathing difficulty.  He admits that he eats a diet high in dairy.       Physical Examination  Review of Systems   Vitals: BP (!) 151/82 (BP Location: Left arm, Patient Position: Sitting, Cuff Size: Adult  Large)   Pulse 50   Resp 16   Wt 93 kg (205 lb)   SpO2 98%   BMI 29.41 kg/m    BMI= Body mass index is 29.41 kg/m .  Wt Readings from Last 3 Encounters:   09/05/23 93 kg (205 lb)   01/04/23 90.9 kg (200 lb 6.4 oz)   09/01/22 89.6 kg (197 lb 9.6 oz)       General Appearance:   no distress, normal body habitus   ENT/Mouth: membranes moist, no oral lesions or bleeding gums.      EYES:  no scleral icterus, normal conjunctivae   Neck: no carotid bruits or thyromegaly   Chest/Lungs:   lungs are clear to auscultation   Cardiovascular:   Regular. Normal first and second heart sounds with no murmur no edema bilaterally        Extremities: no cyanosis or clubbing   Skin: no xanthelasma, warm.    Neurologic: normal  bilateral, no tremors     Psychiatric: alert and oriented x3, calm        Please refer above for cardiac ROS details.        Medical History  Surgical History Family History Social History   Past Medical History:   Diagnosis Date    Basal cell carcinoma of skin     Cancer (H)     Kidney    Coronary artery disease     Palpitations     Paroxysmal atrial fibrillation (H)     PONV (postoperative nausea and vomiting)     Sinus bradycardia      Past Surgical History:   Procedure Laterality Date    ANGIOPLASTY  2010    ANGIOPLASTY  2012    APPENDECTOMY      ARTHROSCOPY KNEE WITH MENISCECTOMY Left 07/01/2016    CV CORONARY ANGIOGRAM N/A 6/22/2018    Procedure: Coronary Angiogram;  Surgeon: Shanique Bourgeois MD;  Location: [facility] Cath Lab;  Service:     CV CORONARY ANGIOGRAM N/A 8/13/2020    Procedure: Coronary Angiogram;  Surgeon: Steve Neville MD;  Location: [facility] Cath Lab;  Service: Cardiology    CV CORONARY ANGIOGRAM N/A 2/11/2021    Procedure: Coronary Angiogram;  Surgeon: Keven Monroy MD;  Location: [facility] Cardiac Cath Lab;  Service: Cardiology    CV LEFT HEART CATHETERIZATION WITH LEFT VENTRICULOGRAM N/A 6/22/2018    Procedure: Left Heart Catheterization with Left Ventriculogram;   Surgeon: Shanique Bourgeois MD;  Location: [facility];  Service:     FOOT FRACTURE SURGERY Right 02/15/2010    NEPHRECTOMY Left     REPAIR MOHS Bilateral 10/23/2019    Procedure: Mohs Reconstruction nasal, left cheek graft;  Surgeon: Alexei Stewart MD;  Location:  OR     Family History   Problem Relation Age of Onset    Hypertension Father     Cerebrovascular Disease Paternal Grandmother     Cancer No family hx of     Diabetes No family hx of     Thyroid Disease No family hx of     Glaucoma No family hx of     Macular Degeneration No family hx of     Coronary Artery Disease Father     No Known Problems Mother     No Known Problems Brother     Hypertension Sister         Social History     Socioeconomic History    Marital status:      Spouse name: Not on file    Number of children: Not on file    Years of education: Not on file    Highest education level: Not on file   Occupational History    Not on file   Tobacco Use    Smoking status: Never    Smokeless tobacco: Never   Substance and Sexual Activity    Alcohol use: No    Drug use: No    Sexual activity: Yes     Partners: Female   Other Topics Concern    Not on file   Social History Narrative    Not on file     Social Determinants of Health     Financial Resource Strain: Not on file   Food Insecurity: Not on file   Transportation Needs: Not on file   Physical Activity: Not on file   Stress: Not on file   Social Connections: Not on file   Intimate Partner Violence: Not on file   Housing Stability: Not on file           Medications  Allergies   Current Outpatient Medications   Medication Sig Dispense Refill    ASPIRIN EC PO Take 81 mg by mouth daily       atorvastatin (LIPITOR) 80 MG tablet Take 1 tablet (80 mg) by mouth daily 90 tablet 3    cholecalciferol 25 MCG (1000 UT) TABS Take 2,000 Units by mouth      clopidogrel (PLAVIX) 75 MG tablet TAKE ONE TABLET BY MOUTH ONE TIME DAILY 90 tablet 0    multivitamin w/minerals (THERA-VIT-M) tablet Take  1 tablet by mouth daily      Nitroglycerin (NITROSTAT SL) Place 0.4 mg under the tongue      nitroGLYcerin (NITROSTAT) 0.4 MG sublingual tablet DISSOLVE ONE TABLET UNDER TONGUE EVERY 5 MINUTES IF NEEDED FOR CHEST PAIN 25 tablet 0    Omega-3 1000 MG capsule Take 2 g by mouth as needed      polyethylene glycol (MIRALAX) 17 GM/Dose powder Take 17 g by mouth daily      sildenafil (REVATIO) 20 MG tablet Take 4 tablets (80 mg) by mouth daily as needed (Erectile dysfunction) Do not take within 24 hours of nitroglycerin or nitrate medication. 40 tablet 6     No Known Allergies       Lab Results    Chemistry/lipid CBC Cardiac Enzymes/BNP/TSH/INR   Recent Labs   Lab Test 01/04/23  1118   CHOL 160   HDL 48   LDL 97   TRIG 76     Recent Labs   Lab Test 01/04/23  1118 01/04/22  1205 02/11/21  0743   LDL 97 76 61     Recent Labs   Lab Test 01/04/23  1118      POTASSIUM 4.5   CHLORIDE 105   CO2 24   GLC 97   BUN 14.7   CR 0.95   GFRESTIMATED 87   RUPAL 9.6     Recent Labs   Lab Test 01/04/23  1118 01/04/22  1205 02/11/21  0743   CR 0.95 0.87 0.81     No results for input(s): A1C in the last 21372 hours.       Recent Labs   Lab Test 01/04/23  1118   WBC 4.8   HGB 11.7*   HCT 35.5*   MCV 86        Recent Labs   Lab Test 01/04/23  1118 01/04/22  1205 02/11/21  0743   HGB 11.7* 12.6* 12.4*    Recent Labs   Lab Test 07/28/20  0805 11/07/19  2311 11/25/18  1128   TROPONINI <0.01 0.01 0.01     Recent Labs   Lab Test 11/25/18  0619   BNP 41     Recent Labs   Lab Test 11/25/18  1128   TSH 2.15     Recent Labs   Lab Test 11/07/19 2311 07/02/19  0633 11/25/18  0619   INR 0.96 1.04 0.96        Janette Johnson MD

## 2023-09-30 ENCOUNTER — TRANSFERRED RECORDS (OUTPATIENT)
Dept: HEALTH INFORMATION MANAGEMENT | Facility: CLINIC | Age: 68
End: 2023-09-30
Payer: COMMERCIAL

## 2023-10-05 ENCOUNTER — TRANSFERRED RECORDS (OUTPATIENT)
Dept: HEALTH INFORMATION MANAGEMENT | Facility: CLINIC | Age: 68
End: 2023-10-05

## 2023-10-09 ENCOUNTER — TRANSFERRED RECORDS (OUTPATIENT)
Dept: HEALTH INFORMATION MANAGEMENT | Facility: CLINIC | Age: 68
End: 2023-10-09
Payer: COMMERCIAL

## 2023-10-10 ENCOUNTER — LAB (OUTPATIENT)
Dept: LAB | Facility: CLINIC | Age: 68
End: 2023-10-10
Payer: COMMERCIAL

## 2023-10-10 DIAGNOSIS — I25.10 CORONARY ARTERY DISEASE INVOLVING NATIVE CORONARY ARTERY OF NATIVE HEART WITHOUT ANGINA PECTORIS: ICD-10-CM

## 2023-10-10 LAB
CHOLEST SERPL-MCNC: 145 MG/DL
HDLC SERPL-MCNC: 47 MG/DL
LDLC SERPL CALC-MCNC: 84 MG/DL
NONHDLC SERPL-MCNC: 98 MG/DL
TRIGL SERPL-MCNC: 71 MG/DL

## 2023-10-10 PROCEDURE — 80061 LIPID PANEL: CPT

## 2023-10-10 PROCEDURE — 36415 COLL VENOUS BLD VENIPUNCTURE: CPT

## 2023-10-16 ENCOUNTER — TELEPHONE (OUTPATIENT)
Dept: CARDIOLOGY | Facility: CLINIC | Age: 68
End: 2023-10-16
Payer: COMMERCIAL

## 2023-10-16 ENCOUNTER — TRANSFERRED RECORDS (OUTPATIENT)
Dept: HEALTH INFORMATION MANAGEMENT | Facility: CLINIC | Age: 68
End: 2023-10-16
Payer: COMMERCIAL

## 2023-10-16 NOTE — TELEPHONE ENCOUNTER
[place] reviewed.  Patients recent blood pressure is outside of guideline parameters.  Called pt to review, no answer.  Left voicemail message asking patient to check their blood pressure using a home blood pressure cuff or by going to a [facility].  Patient instructed to then call [phone] (Psychiatric) and leave a message with their name, date of birth, and blood pressure reading that was completed within the last 24 hours and where it was completed.  Will await call back for further review.    ZENY Ge

## 2023-10-18 DIAGNOSIS — I25.10 CORONARY ARTERY DISEASE INVOLVING NATIVE CORONARY ARTERY OF NATIVE HEART WITHOUT ANGINA PECTORIS: Primary | ICD-10-CM

## 2023-10-18 DIAGNOSIS — E78.5 DYSLIPIDEMIA, GOAL LDL BELOW 70: ICD-10-CM

## 2023-10-18 RX ORDER — ROSUVASTATIN CALCIUM 40 MG/1
40 TABLET, COATED ORAL DAILY
Qty: 90 TABLET | Refills: 0 | Status: SHIPPED | OUTPATIENT
Start: 2023-10-18 | End: 2024-01-05

## 2023-10-24 ENCOUNTER — TRANSFERRED RECORDS (OUTPATIENT)
Dept: HEALTH INFORMATION MANAGEMENT | Facility: CLINIC | Age: 68
End: 2023-10-24
Payer: COMMERCIAL

## 2023-10-27 ENCOUNTER — TRANSFERRED RECORDS (OUTPATIENT)
Dept: HEALTH INFORMATION MANAGEMENT | Facility: CLINIC | Age: 68
End: 2023-10-27
Payer: COMMERCIAL

## 2023-10-30 ENCOUNTER — TRANSFERRED RECORDS (OUTPATIENT)
Dept: HEALTH INFORMATION MANAGEMENT | Facility: CLINIC | Age: 68
End: 2023-10-30

## 2023-10-30 ENCOUNTER — LAB REQUISITION (OUTPATIENT)
Dept: LAB | Facility: CLINIC | Age: 68
End: 2023-10-30
Payer: COMMERCIAL

## 2023-10-30 DIAGNOSIS — M25.562 PAIN IN LEFT KNEE: ICD-10-CM

## 2023-10-30 DIAGNOSIS — M25.40 EFFUSION, UNSPECIFIED JOINT: ICD-10-CM

## 2023-10-30 LAB
APPEARANCE FLD: ABNORMAL
BASOPHILS NFR FLD MANUAL: 1 %
CELL COUNT BODY FLUID SOURCE: ABNORMAL
COLOR FLD: ABNORMAL
EOSINOPHIL NFR FLD MANUAL: 3 %
GRAM STAIN RESULT: NORMAL
GRAM STAIN RESULT: NORMAL
LYMPHOCYTES NFR FLD MANUAL: 74 %
MONOS+MACROS NFR FLD MANUAL: 8 %
NEUTS BAND NFR FLD MANUAL: 14 %
WBC # FLD AUTO: 1790 /UL

## 2023-10-30 PROCEDURE — 87075 CULTR BACTERIA EXCEPT BLOOD: CPT | Mod: ORL | Performed by: ORTHOPAEDIC SURGERY

## 2023-10-30 PROCEDURE — 89051 BODY FLUID CELL COUNT: CPT | Mod: ORL | Performed by: ORTHOPAEDIC SURGERY

## 2023-10-30 PROCEDURE — 87070 CULTURE OTHR SPECIMN AEROBIC: CPT | Mod: ORL | Performed by: ORTHOPAEDIC SURGERY

## 2023-10-30 PROCEDURE — 87205 SMEAR GRAM STAIN: CPT | Mod: ORL | Performed by: ORTHOPAEDIC SURGERY

## 2023-10-30 NOTE — TELEPHONE ENCOUNTER
[place].  Patients recent blood pressure is outside of guideline parameters.  Called pt to review, no answer.  Left voicemail message asking patient to check their blood pressure using a home blood pressure cuff or by going to a [facility].  Patient instructed to then call [phone] ([facility]) and leave a message with their name, date of birth, and blood pressure reading that was completed within the last 24 hours and where it was completed.  Will await call back for further review.    ZENY Ge

## 2023-11-02 ENCOUNTER — NURSE TRIAGE (OUTPATIENT)
Dept: NURSING | Facility: CLINIC | Age: 68
End: 2023-11-02
Payer: COMMERCIAL

## 2023-11-02 DIAGNOSIS — I10 ESSENTIAL HYPERTENSION: Primary | ICD-10-CM

## 2023-11-02 NOTE — TELEPHONE ENCOUNTER
Patient returned call and left voicemail message with update blood pressure reading.      Last Blood Pressure: 151/82  Last Heart Rate: 50  Date: 09/05/23  Location: [facility]    Today's Blood Pressure: 144/81  Today's Heart Rate: 54  Location: Home BP    Patient reported blood pressure updated in Epic. Blood pressure continues to fall outside of the [place] Community Measures guidelines.  Message sent to primary cardiology team for further review.  Message sent via [email or web address] to Dr Johnson.

## 2023-11-03 ENCOUNTER — TELEPHONE (OUTPATIENT)
Dept: INTERNAL MEDICINE | Facility: CLINIC | Age: 68
End: 2023-11-03
Payer: COMMERCIAL

## 2023-11-03 RX ORDER — AMLODIPINE BESYLATE 2.5 MG/1
2.5 TABLET ORAL DAILY
Qty: 30 TABLET | Refills: 0 | Status: SHIPPED | OUTPATIENT
Start: 2023-11-03 | End: 2023-11-28

## 2023-11-03 NOTE — TELEPHONE ENCOUNTER
Make sure patient is not taking any ibuprofen, Advil or Aleve type medication, as that can raise the blood pressure.  I did send a prescription to his [facility] pharmacy for a mild blood pressure medication called amlodipine.  He can take 1 a day to lower blood pressure.    Have patient schedule an appointment with me next available.    If he is having chest pain, increasing shortness of breath, neurologic changes or other new issues, have him be seen in the emergency room.

## 2023-11-03 NOTE — TELEPHONE ENCOUNTER
S: R/O hypertension.    B: Patient gave writer permission to speak with his wife about his health.  Writer spoke with both the patient and his wife.    Concerned about his high blood pressure.11/2 readings have  been 181/87 & 181/81.      Has stopped taking blood thinners per provider recommendation.  This was done due to left knee effusion.   The left knee effusion started the end of August.  He has had knee aspirated 5 times.  On 10/30 synovial fluid was sent off to lab.  Has been going to [facility] and seeing Dr. Welch for the care of his knee.    Has a cardiac HX of CAD, Afib, three stints placed, bradycardia, palpitations, and abnormal stress test.    A: Per protocol to see provider within 24  hours.  Will call PCP office in the morning.     R: Protocol and care advice reviewed. Patient verbalized understanding, in agreement with plan, and voiced no further questions. Call back with any new or worsening symptoms, concerns, or questions.    Jayleen Andrews RN, MA  [facility] Triage Nurse Advisor          Reason for Disposition   Systolic BP  >= 180 OR Diastolic >= 110    Additional Information   Negative: Difficult to awaken or acting confused (e.g., disoriented, slurred speech)   Negative: SEVERE difficulty breathing (e.g., struggling for each breath, speaks in single words)   Negative: [1] Weakness of the face, arm or leg on one side of the body AND [2] new-onset   Negative: [1] Numbness (i.e., loss of sensation) of the face, arm or leg on one side of the body AND [2] new-onset   Negative: [1] Chest pain lasts > 5 minutes AND [2] history of heart disease (i.e., heart attack, bypass surgery, angina, angioplasty, CHF)   Negative: [1] Chest pain AND [2] took nitrogylcerin AND [3] pain was not relieved   Negative: Sounds like a life-threatening emergency to the triager   Negative: [1] Systolic BP  >= 160 OR Diastolic >= 100 AND [2] cardiac (e.g., breathing difficulty, chest pain) or neurologic symptoms (e.g.,  new-onset blurred or double vision, unsteady gait)   Negative: [1] Pregnant 20 or more weeks (or postpartum < 6 weeks) AND [2] new hand or face swelling   Negative: [1] Pregnant 20 or more weeks (or postpartum < 6 weeks) AND [2] Systolic BP >= 160 OR Diastolic >= 110   Negative: [1] Systolic BP  >= 200 OR Diastolic >= 120 AND [2] having NO cardiac or neurologic symptoms   Negative: [1] Pregnant 20 or more weeks (or postpartum < 6 weeks) AND [2] Systolic BP  >= 140 OR Diastolic >= 90   Negative: [1] Systolic BP  >= 180 OR Diastolic >= 110 AND [2] missed most recent dose of blood pressure medication    Protocols used: Blood Pressure - High-A-

## 2023-11-04 LAB — BACTERIA SNV CULT: NO GROWTH

## 2023-11-05 ENCOUNTER — NURSE TRIAGE (OUTPATIENT)
Dept: NURSING | Facility: CLINIC | Age: 68
End: 2023-11-05
Payer: COMMERCIAL

## 2023-11-05 NOTE — TELEPHONE ENCOUNTER
Pt's wife calling with concern of high BP readings. Consent on file. He was just started on BP med Friday, 2.5 mg Amlodipine. Pt took BP while on the phone and it was /80 P54.     Pt denies any new or concerning SXs.    Advised HC, and when to call back. Discussed proper BP taking and for them to bring BP monitor with them to appt tomorrow to verify it correlates with manual BP and so PCP can see where pt has been trending since starting BP med.     They verbalized understanding.    Kaitlynn Matson, RN, BSN  [facility] Nurse Advisor 2:52 PM 11/5/2023    Reason for Disposition   Systolic BP  >= 160 OR Diastolic >= 100    Additional Information   Negative: Difficult to awaken or acting confused (e.g., disoriented, slurred speech)   Negative: SEVERE difficulty breathing (e.g., struggling for each breath, speaks in single words)   Negative: [1] Weakness of the face, arm or leg on one side of the body AND [2] new-onset   Negative: [1] Numbness (i.e., loss of sensation) of the face, arm or leg on one side of the body AND [2] new-onset   Negative: [1] Chest pain lasts > 5 minutes AND [2] history of heart disease (i.e., heart attack, bypass surgery, angina, angioplasty, CHF)   Negative: [1] Chest pain AND [2] took nitrogylcerin AND [3] pain was not relieved   Negative: Sounds like a life-threatening emergency to the triager   Negative: Symptom is main concern (e.g., headache, chest pain)   Negative: Low blood pressure is main concern   Negative: [1] Systolic BP  >= 160 OR Diastolic >= 100 AND [2] cardiac (e.g., breathing difficulty, chest pain) or neurologic symptoms (e.g., new-onset blurred or double vision, unsteady gait)   Negative: [1] Pregnant 20 or more weeks (or postpartum < 6 weeks) AND [2] new hand or face swelling   Negative: [1] Pregnant 20 or more weeks (or postpartum < 6 weeks) AND [2] Systolic BP >= 160 OR Diastolic >= 110   Negative: [1] Systolic BP  >= 200 OR Diastolic >= 120 AND [2] having NO  cardiac or neurologic symptoms   Negative: [1] Pregnant 20 or more weeks (or postpartum < 6 weeks) AND [2] Systolic BP  >= 140 OR Diastolic >= 90   Negative: [1] Systolic BP  >= 180 OR Diastolic >= 110 AND [2] missed most recent dose of blood pressure medication   Negative: Systolic BP  >= 180 OR Diastolic >= 110   Negative: Ran out of BP medications    Protocols used: Blood Pressure - High-A-

## 2023-11-06 ENCOUNTER — OFFICE VISIT (OUTPATIENT)
Dept: INTERNAL MEDICINE | Facility: CLINIC | Age: 68
End: 2023-11-06
Payer: COMMERCIAL

## 2023-11-06 VITALS
BODY MASS INDEX: 29.06 KG/M2 | RESPIRATION RATE: 16 BRPM | TEMPERATURE: 98.3 F | SYSTOLIC BLOOD PRESSURE: 128 MMHG | OXYGEN SATURATION: 98 % | DIASTOLIC BLOOD PRESSURE: 72 MMHG | HEART RATE: 72 BPM | HEIGHT: 70 IN | WEIGHT: 203 LBS

## 2023-11-06 DIAGNOSIS — M25.562 LEFT KNEE PAIN, UNSPECIFIED CHRONICITY: ICD-10-CM

## 2023-11-06 DIAGNOSIS — I25.10 CORONARY ARTERY DISEASE DUE TO CALCIFIED CORONARY LESION: ICD-10-CM

## 2023-11-06 DIAGNOSIS — I10 ESSENTIAL HYPERTENSION: Primary | ICD-10-CM

## 2023-11-06 DIAGNOSIS — I25.84 CORONARY ARTERY DISEASE DUE TO CALCIFIED CORONARY LESION: ICD-10-CM

## 2023-11-06 DIAGNOSIS — Z23 NEED FOR IMMUNIZATION AGAINST INFLUENZA: ICD-10-CM

## 2023-11-06 PROCEDURE — 99213 OFFICE O/P EST LOW 20 MIN: CPT | Mod: 25 | Performed by: INTERNAL MEDICINE

## 2023-11-06 PROCEDURE — 90471 IMMUNIZATION ADMIN: CPT | Performed by: INTERNAL MEDICINE

## 2023-11-06 PROCEDURE — 90662 IIV NO PRSV INCREASED AG IM: CPT | Performed by: INTERNAL MEDICINE

## 2023-11-06 NOTE — PROGRESS NOTES
Jose Lynn   68 year old male    Date of Visit: 11/6/2023    Chief Complaint   Patient presents with    Joint Swelling     Lt knee swelling.     Hypertension     BP running high     Subjective  68-year-old male is here with his wife, Morena.    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    Patient does not normally take blood pressure medication.  Blood pressure was 110/72 in January at his physical.    Patient was having left knee pain 1 month ago, had a cortisone shot with hemarthrosis, and has had 4 other episodes of draining the knee over the past month.  He has been taking some ibuprofen, but stopped that a week ago.    He is on aspirin and Plavix although did temporarily stop the Plavix with the bleeding.    He has not been walking or is active is much as usual.    Over the past month he started checking his blood pressure and it was running high in the 160-180/80-90 range.  He got a new blood pressure cuff from "[employer].    He otherwise feels fine and no increasing edema or headache.  Does not have a history of sleep apnea.    No new chest pain or increasing shortness of breath and weight is actually down 2 pounds.  Diet is stable.    He drives the schoolbus.  No chest pain or chest pressure.  History of inferior septal MI and multiple stents.    PMHx:    Past Medical History:   Diagnosis Date    Basal cell carcinoma of skin     Cancer (H)     Kidney    Coronary artery disease     Palpitations     Paroxysmal atrial fibrillation (H)     PONV (postoperative nausea and vomiting)     Sinus bradycardia      PSHx:    Past Surgical History:   Procedure Laterality Date    ANGIOPLASTY  2010    ANGIOPLASTY  2012    APPENDECTOMY      ARTHROSCOPY KNEE WITH MENISCECTOMY Left 07/01/2016    CV CORONARY ANGIOGRAM N/A 6/22/2018    Procedure: Coronary Angiogram;  Surgeon: Shanique Bourgeois MD;  Location:   "[facility] Cath Lab;  Service:     CV CORONARY ANGIOGRAM N/A 8/13/2020    Procedure: Coronary Angiogram;  Surgeon: Steve Neville MD;  Location: [facility] Cath Lab;  Service: Cardiology    CV CORONARY ANGIOGRAM N/A 2/11/2021    Procedure: Coronary Angiogram;  Surgeon: Keven Monroy MD;  Location: [facility] Cardiac Cath Lab;  Service: Cardiology    CV LEFT HEART CATHETERIZATION WITH LEFT VENTRICULOGRAM N/A 6/22/2018    Procedure: Left Heart Catheterization with Left Ventriculogram;  Surgeon: Shanique Bourgeois MD;  Location: [facility] Cath Lab;  Service:     FOOT FRACTURE SURGERY Right 02/15/2010    NEPHRECTOMY Left     REPAIR MOHS Bilateral 10/23/2019    Procedure: Mohs Reconstruction nasal, left cheek graft;  Surgeon: lAexei Stewart MD;  Location:  OR     Immunizations:   Immunization History   Administered Date(s) Administered    COVID-19 Monovalent 18+ (Moderna) 02/13/2021, 03/13/2021    DT (PEDS <7y) 01/01/1994, 04/22/2003    FLU 6-35 months 10/04/2011, 09/23/2013, 10/21/2014    HepA-Peds, Unspecified 07/10/2000, 04/22/2004    Influenza Vaccine 18-64 (Flublok) 10/11/2019    Influenza Vaccine 65+ (FLUAD) 12/31/2020, 01/04/2023    Influenza Vaccine, 6+MO IM (QUADRIVALENT W/PRESERVATIVES) 10/25/2018    Pneumo Conj 13-V (2010&after) 07/09/2020    Pneumococcal 23 valent 06/04/2021    TDAP (Adacel,Boostrix) 12/05/2012, 10/15/2022    Td (Adult), Adsorbed 07/21/1994, 04/22/2003, 12/31/2004    Td,adult,historic,unspecified 04/22/2003, 12/05/2012    Typhoid, Unspecified Formulation 04/22/2004    Zoster recombinant adjuvanted (SHINGRIX) 12/31/2020, 04/17/2021    Zoster vaccine, live 10/17/2016       ROS A comprehensive review of systems was performed and was otherwise negative    Medications, allergies, and problem list were reviewed and updated    Exam  /72   Pulse 72   Temp 98.3  F (36.8  C)   Resp 16   Ht 1.778 m (5' 10\")   Wt 92.1 kg (203 lb)   SpO2 98%   BMI 29.13 kg/m    Heart is " regular without murmur.  No edema.    Blood pressure for me was 122/80, his blood pressure cuff showed 173/83 blood pressure.    Assessment/Plan  1. Essential hypertension  His blood pressure machine does not appear to be correlating well.  Unclear if his blood pressure was truly significantly high after his knee episode.    I suspect his change in activity and use of ibuprofen was affecting his blood pressure, however.    His blood pressure is normal today and he otherwise feels well.    Continue on the amlodipine 2.5 mg a day that was started 2 days ago and continue to follow blood pressure.  He will need a new blood pressure cuff.    See patient instructions for adjusting amlodipine, if needed.    He will not have any further ibuprofen or Aleve or Advil.    2. Left knee pain, unspecified chronicity  Patient had a hemarthrosis and likely degenerative exacerbation of the knee.  Is already seen Ortho and no specific follow-up at this time.  Continue gentle range of motion exercises and avoid strain activity for the knee.  No further NSAIDs.    3. Coronary artery disease due to calcified coronary lesion  Asymptomatic.  Continue aspirin Plavix and Lipitor 80 mg.    4. Need for immunization against influenza  Given today  - INFLUENZA VACCINE 65+ (FLUZONE HD)      Return in about 2 months (around 1/10/2024) for Adult wellness visit physical exam and blood pressure follow-up.   Patient Instructions   Continue on amlodipine 2.5 mg a day.  Goal blood pressure less than 135/85, but not less than 110/60.    Get a new [employer] blood pressure cuff.  Your current cuff does not appear to be accurate.    Contact me if your blood pressure is outside of that range more than occasionally.  Otherwise I will see you for a physical exam sometime next year.    Avoid ibuprofen and Aleve or Advil, as that can affect blood pressure and kidney function.  Okay to take Tylenol as needed for pain.    Keep the knee moving with gentle range of  motion but avoid over straining of the knee.    Follow-up for adult wellness visit physical exam sometime after January 4.    Aki Bolaños MD, MD        Current Outpatient Medications   Medication Sig Dispense Refill    amLODIPine (NORVASC) 2.5 MG tablet Take 1 tablet (2.5 mg) by mouth daily 30 tablet 0    ASPIRIN EC PO Take 81 mg by mouth daily       cholecalciferol 25 MCG (1000 UT) TABS Take 2,000 Units by mouth      clopidogrel (PLAVIX) 75 MG tablet TAKE ONE TABLET BY MOUTH ONE TIME DAILY 90 tablet 0    multivitamin w/minerals (THERA-VIT-M) tablet Take 1 tablet by mouth daily      nitroGLYcerin (NITROSTAT) 0.4 MG sublingual tablet DISSOLVE ONE TABLET UNDER TONGUE EVERY 5 MINUTES IF NEEDED FOR CHEST PAIN 25 tablet 0    Omega-3 1000 MG capsule Take 2 g by mouth as needed      polyethylene glycol (MIRALAX) 17 GM/Dose powder Take 17 g by mouth daily      rosuvastatin (CRESTOR) 40 MG tablet Take 1 tablet (40 mg) by mouth daily 90 tablet 0    sildenafil (REVATIO) 20 MG tablet Take 4 tablets (80 mg) by mouth daily as needed (Erectile dysfunction) Do not take within 24 hours of nitroglycerin or nitrate medication. 40 tablet 6     No Known Allergies  Social History     Tobacco Use    Smoking status: Never     Passive exposure: Never    Smokeless tobacco: Never   Vaping Use    Vaping Use: Never used   Substance Use Topics    Alcohol use: No    Drug use: No             Subjective   Jose is a 68 year old, presenting for the following health issues:  Joint Swelling (Lt knee swelling. ) and Hypertension (BP running high)      11/6/2023     9:35 AM   Additional Questions   Roomed by Shanique EDWARD   Accompanied by wife Morena         11/6/2023     9:35 AM   Patient Reported Additional Medications   Patient reports taking the following new medications no       History of Present Illness       Reason for visit:  Dr. Amezcua said I could go off aspirin and Plavix. Also Dr. ken that s why my knee is playing so much internally. I would like  your help to coordinate my care.  Symptom onset:  More than a month  Symptoms include:  Swelling and pain.  Symptom intensity:  Moderate  Symptom progression:  Staying the same  Had these symptoms before:  No    He eats 2-3 servings of fruits and vegetables daily.He consumes 0 sweetened beverage(s) daily.He exercises with enough effort to increase his heart rate 20 to 29 minutes per day.  He exercises with enough effort to increase his heart rate 5 days per week.   He is taking medications regularly.                 Review of Systems         Objective    There were no vitals taken for this visit.  There is no height or weight on file to calculate BMI.  Physical Exam

## 2023-11-06 NOTE — PATIENT INSTRUCTIONS
Continue on amlodipine 2.5 mg a day.  Goal blood pressure less than 135/85, but not less than 110/60.    Get a new Omron blood pressure cuff.  Your current cuff does not appear to be accurate.    Contact me if your blood pressure is outside of that range more than occasionally.  Otherwise I will see you for a physical exam sometime next year.    Avoid ibuprofen and Aleve or Advil, as that can affect blood pressure and kidney function.  Okay to take Tylenol as needed for pain.    Keep the knee moving with gentle range of motion but avoid over straining of the knee.    Follow-up for adult wellness visit physical exam sometime after January 4.

## 2023-11-13 LAB — BACTERIA SNV CULT: NORMAL

## 2023-11-16 NOTE — TELEPHONE ENCOUNTER
Reason for Call:  Appointment Request    Patient requesting this type of appt:  office visit    Requested provider: Aki Bolaños    Reason patient unable to be scheduled: Not within requested timeframe    When does patient want to be seen/preferred time: Same day    Comments: High blood preassure 1801/81    Could we send this information to you in [place] or would you prefer to receive a phone call?:   Patient would prefer a phone call   Okay to leave a detailed message?: Yes at [phone]   Call taken on 11/3/2023 at 7:10 AM by Kathe Jacques     [id number]  Received Choice form at 1223  Agency/Facility Name: [facility]   Referral sent per Choice form @ 1993

## 2023-11-28 ENCOUNTER — MYC REFILL (OUTPATIENT)
Dept: INTERNAL MEDICINE | Facility: CLINIC | Age: 68
End: 2023-11-28
Payer: COMMERCIAL

## 2023-11-28 DIAGNOSIS — I10 ESSENTIAL HYPERTENSION: ICD-10-CM

## 2023-11-28 RX ORDER — AMLODIPINE BESYLATE 2.5 MG/1
2.5 TABLET ORAL DAILY
Qty: 30 TABLET | Refills: 8 | Status: SHIPPED | OUTPATIENT
Start: 2023-11-28 | End: 2024-01-05

## 2023-11-28 NOTE — TELEPHONE ENCOUNTER
"Please respond to pt question on med refill request. \"My blood pressure this morning was 132/77 with a pulse of 57. Should I continue taking this medicine? Should the prescription be changed? Thank you, (pt).\"    Sheryl Fulton RN     "

## 2023-12-05 DIAGNOSIS — I25.10 CORONARY ATHEROSCLEROSIS: ICD-10-CM

## 2023-12-06 RX ORDER — CLOPIDOGREL BISULFATE 75 MG/1
75 TABLET ORAL DAILY
Qty: 90 TABLET | Refills: 2 | Status: SHIPPED | OUTPATIENT
Start: 2023-12-06 | End: 2024-01-05

## 2023-12-08 ENCOUNTER — PATIENT OUTREACH (OUTPATIENT)
Dept: GASTROENTEROLOGY | Facility: CLINIC | Age: 68
End: 2023-12-08
Payer: COMMERCIAL

## 2024-01-04 NOTE — COMMUNITY RESOURCES LIST (ENGLISH)
01/04/2024   [facility]  N/A  For questions about this resource list or additional care needs, please contact your primary care clinic or care manager.  Phone: [phone]   Email: N/A   Address: [address] [place] [id number]   Hours: N/A        Financial Stability       Utility payment assistance  1  [employer] [facility] ([facility] - Energy Assistance Distance: 2.72 miles      Phone/Virtual   [address] [place] [address]  Language: English, Hmong, Chadian, Dominican  Hours: Mon - Fri 8:00 AM - 12:00 PM , Mon - Fri 1:00 PM - 4:30 PM  Fees: Free   Phone: (375) 564-8872 Email: tato@caprw.org Website: http://www.capr.org     2  [facility] - Emergency Assistance [place] - Utility payment assistance Distance: 3.19 miles      In-Person, Phone/Virtual   [address] [place] [address]  Language: English, Dominican  Hours: Mon - Fri 9:00 AM - 4:00 PM  Fees: Free   Phone: (769) 986-6075 Email: info@eventuosity.org Website: http://www.[facility].org          Important Numbers & Websites       Emergency Services   911  City Services   311  Poison Control   (717) 439-3924  Suicide Prevention Lifeline   (390) 419-8987 (TALK)  Child Abuse Hotline   (288) 184-4801 (4-A-Child)  Sexual Assault Hotline   (330) 702-2469 (HOPE)  National Runaway Safeline   (984) 466-3820 (RUNAWAY)  All-Options Talkline   (262) 178-9064  Substance Abuse Referral   (161) 979-5813 (HELP)

## 2024-01-05 ENCOUNTER — OFFICE VISIT (OUTPATIENT)
Dept: INTERNAL MEDICINE | Facility: CLINIC | Age: 69
End: 2024-01-05
Payer: COMMERCIAL

## 2024-01-05 VITALS
OXYGEN SATURATION: 99 % | HEART RATE: 51 BPM | BODY MASS INDEX: 28.77 KG/M2 | HEIGHT: 70 IN | RESPIRATION RATE: 16 BRPM | TEMPERATURE: 97.9 F | WEIGHT: 201 LBS | DIASTOLIC BLOOD PRESSURE: 80 MMHG | SYSTOLIC BLOOD PRESSURE: 122 MMHG

## 2024-01-05 DIAGNOSIS — Z85.828 HISTORY OF BASAL CELL CARCINOMA (BCC) OF SKIN: ICD-10-CM

## 2024-01-05 DIAGNOSIS — Z51.81 ENCOUNTER FOR THERAPEUTIC DRUG MONITORING: ICD-10-CM

## 2024-01-05 DIAGNOSIS — I10 ESSENTIAL HYPERTENSION: ICD-10-CM

## 2024-01-05 DIAGNOSIS — Z86.0100 HISTORY OF COLONIC POLYPS: ICD-10-CM

## 2024-01-05 DIAGNOSIS — Z00.00 HEALTHCARE MAINTENANCE: Primary | ICD-10-CM

## 2024-01-05 DIAGNOSIS — I25.10 CORONARY ARTERY DISEASE INVOLVING NATIVE CORONARY ARTERY OF NATIVE HEART WITHOUT ANGINA PECTORIS: ICD-10-CM

## 2024-01-05 DIAGNOSIS — H61.22 IMPACTED CERUMEN OF LEFT EAR: ICD-10-CM

## 2024-01-05 DIAGNOSIS — Z12.5 SCREENING FOR PROSTATE CANCER: ICD-10-CM

## 2024-01-05 DIAGNOSIS — M25.562 CHRONIC PAIN OF LEFT KNEE: ICD-10-CM

## 2024-01-05 DIAGNOSIS — E78.5 DYSLIPIDEMIA, GOAL LDL BELOW 70: ICD-10-CM

## 2024-01-05 DIAGNOSIS — G89.29 CHRONIC PAIN OF LEFT KNEE: ICD-10-CM

## 2024-01-05 LAB
ERYTHROCYTE [DISTWIDTH] IN BLOOD BY AUTOMATED COUNT: 12.2 % (ref 10–15)
HCT VFR BLD AUTO: 32.7 % (ref 40–53)
HGB BLD-MCNC: 11.2 G/DL (ref 13.3–17.7)
MCH RBC QN AUTO: 29.9 PG (ref 26.5–33)
MCHC RBC AUTO-ENTMCNC: 34.3 G/DL (ref 31.5–36.5)
MCV RBC AUTO: 87 FL (ref 78–100)
PLATELET # BLD AUTO: 193 10E3/UL (ref 150–450)
RBC # BLD AUTO: 3.74 10E6/UL (ref 4.4–5.9)
WBC # BLD AUTO: 5.2 10E3/UL (ref 4–11)

## 2024-01-05 PROCEDURE — 36415 COLL VENOUS BLD VENIPUNCTURE: CPT | Performed by: INTERNAL MEDICINE

## 2024-01-05 PROCEDURE — G0103 PSA SCREENING: HCPCS | Performed by: INTERNAL MEDICINE

## 2024-01-05 PROCEDURE — 85027 COMPLETE CBC AUTOMATED: CPT | Performed by: INTERNAL MEDICINE

## 2024-01-05 PROCEDURE — 80053 COMPREHEN METABOLIC PANEL: CPT | Performed by: INTERNAL MEDICINE

## 2024-01-05 PROCEDURE — 80061 LIPID PANEL: CPT | Performed by: INTERNAL MEDICINE

## 2024-01-05 PROCEDURE — 99397 PER PM REEVAL EST PAT 65+ YR: CPT | Performed by: INTERNAL MEDICINE

## 2024-01-05 PROCEDURE — 99214 OFFICE O/P EST MOD 30 MIN: CPT | Mod: 25 | Performed by: INTERNAL MEDICINE

## 2024-01-05 RX ORDER — CLOPIDOGREL BISULFATE 75 MG/1
75 TABLET ORAL DAILY
Qty: 90 TABLET | Refills: 3 | Status: SHIPPED | OUTPATIENT
Start: 2024-01-05

## 2024-01-05 RX ORDER — AMLODIPINE BESYLATE 2.5 MG/1
2.5 TABLET ORAL DAILY
Qty: 90 TABLET | Refills: 3 | Status: SHIPPED | OUTPATIENT
Start: 2024-01-05

## 2024-01-05 RX ORDER — ROSUVASTATIN CALCIUM 40 MG/1
40 TABLET, COATED ORAL DAILY
Qty: 90 TABLET | Refills: 3 | Status: SHIPPED | OUTPATIENT
Start: 2024-01-05

## 2024-01-05 ASSESSMENT — ENCOUNTER SYMPTOMS
CHILLS: 0
MYALGIAS: 0
HEARTBURN: 0
CONSTIPATION: 0
NERVOUS/ANXIOUS: 0
WEAKNESS: 0
HEADACHES: 0
SORE THROAT: 0
EYE PAIN: 0
FEVER: 0
FREQUENCY: 0
ABDOMINAL PAIN: 0
COUGH: 0
DIARRHEA: 0
DIZZINESS: 0
PALPITATIONS: 0
PARESTHESIAS: 0
ARTHRALGIAS: 0
NAUSEA: 0
SHORTNESS OF BREATH: 0
HEMATOCHEZIA: 0
DYSURIA: 0
HEMATURIA: 0
JOINT SWELLING: 0

## 2024-01-05 ASSESSMENT — ACTIVITIES OF DAILY LIVING (ADL): CURRENT_FUNCTION: NO ASSISTANCE NEEDED

## 2024-01-05 NOTE — PATIENT INSTRUCTIONS
Your colonoscopy is due in July of this year.  You should be contacted by [facility] to set up your colonoscopy.  If you do not receive contact from [facility], contact me to send an order.    I would recommend that you continue on your aspirin 81 mg a day before the colonoscopy.  Stop the Plavix for 7 days before the colonoscopy and restart day after colonoscopy if no major bleeding.    Continue to get regular exercise.  20 minutes of aerobic type exercise 4 times a week or more is recommended.  If you have any difficulty exercising such as shortness of breath, chest pain, fluttering feeling, neck pain or other new symptoms, get evaluated right away.    You can continue your MiraLAX long-term.    See dermatology as planned for your regular skin exam but I did not see concerning skin lesions today.    Continue to see ophthalmology for yearly glaucoma screening and eye exam.    You do have a cerumen impaction on the left.  Try Debrox earwax drops, but if that does not clear out on its own, would recommend you see the ENT clinic to remove the earwax.    Goal blood pressure less than 135/85 but not less than 110/60.  Continue on current amlodipine but contact me if your blood pressure is outside of that range more than occasionally.    See me in 1 year for Zickel exam, or sooner if needed for any issues.      Patient Education

## 2024-01-05 NOTE — PROGRESS NOTES
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    Here with wife, Morena.    Severe coronary disease.  History of multiple stents, including LAD in 2010, OM in 2012 and a PTCA of the RCA in 2020.  He had a catheterization in 2021 that showed patent stents and just moderate disease in other arteries and no intervention.    He has had an inferior septal Q waves, but he cannot specifically identify an episode where he had a heart attack.    He uses an exercise bike.  Has an elliptical machine but does not often use it.  He denies a high level of exertion on these machines, but he does tolerate his usual amount of exertion without difficulty and no chest pain or shortness of breath or neck pain or shoulder pain or fluttering feeling.    He had an episode in 2018 of paroxysmal atrial fibrillation but no recurrence.  He does not drink alcohol or have sleep apnea.    He had borderline blood pressure but has been well-controlled on amlodipine 2.5 mg a day.  No edema.    He has occasional reflux cough, intermittent with just some mild phlegm.  No chronic sinusitis.  Never smoked.  No swallowing difficulty.    History of congenital atrophy of the kidneys and left hypernephroma removed in the past.  But kidney function has been normal.  Normal creatinine in January of last year.    No lightheaded dizzy spells.  No fainting spells.    No headache complaints.    No bleeding issues or nosebleeds.    He still on aspirin and Plavix.  No generalized myalgias or abdominal pain issues on the rosuvastatin 40 mg.  October 2023 LDL 84 and HDL 47.    He had a left knee hemarthrosis after cortisone shot in October he had multiple fluid drainages of the knee.  But he now has no knee pain or swelling and is walking normally.    Denies any urinary symptoms.    Bowels  "are normal without blood in stool.      July 2019 colonoscopy had a 3 mm polyp with a 5-year follow-up recommended.    Past history of basal cell cancer and melanoma on his forehead.  He denies new skin lesions.  He had some sun damage on his temples, but no growing lesions.  He has a dermatology appointment coming up soon.    He did see ophthalmology last year for routine eye exam and reports a normal eye exam.  Denies new vision changes.    No new cough or respiratory illness.  He declines COVID-vaccine.    He has 10 grandchildren.      1/5/2024    11:09 AM   Additional Questions   Roomed by Jenny MEDINA       Healthy Habits:     In general, how would you rate your overall health?  Excellent    Frequency of exercise:  6-7 days/week    Duration of exercise:  15-30 minutes    Do you usually eat at least 4 servings of fruit and vegetables a day, include whole grains    & fiber and avoid regularly eating high fat or \"junk\" foods?  Yes    Taking medications regularly:  Yes    Medication side effects:  None    Ability to successfully perform activities of daily living:  No assistance needed    Home Safety:  No safety concerns identified    Hearing Impairment:  No hearing concerns    In the past 6 months, have you been bothered by leaking of urine?  No    In general, how would you rate your overall mental or emotional health?  Excellent    Additional concerns today:  No                      Social History     Tobacco Use    Smoking status: Never     Passive exposure: Never    Smokeless tobacco: Never   Substance Use Topics    Alcohol use: No             12/29/2023     9:57 AM   Alcohol Use   Prescreen: >3 drinks/day or >7 drinks/week? No       Last PSA:   Prostate Specific Antigen Screen   Date Value Ref Range Status   01/04/2023 0.12 0.00 - 4.50 ng/mL Final   01/04/2022 0.15 0.00 - 4.50 ug/L Final       Reviewed orders with patient. Reviewed health maintenance and updated orders accordingly - Yes  Current Outpatient " Medications   Medication Sig Dispense Refill    amLODIPine (NORVASC) 2.5 MG tablet Take 1 tablet (2.5 mg) by mouth daily 90 tablet 3    ASPIRIN EC PO Take 81 mg by mouth daily       cholecalciferol 25 MCG (1000 UT) TABS Take 2,000 Units by mouth      clopidogrel (PLAVIX) 75 MG tablet Take 1 tablet (75 mg) by mouth daily 90 tablet 3    multivitamin w/minerals (THERA-VIT-M) tablet Take 1 tablet by mouth daily      nitroGLYcerin (NITROSTAT) 0.4 MG sublingual tablet DISSOLVE ONE TABLET UNDER TONGUE EVERY 5 MINUTES IF NEEDED FOR CHEST PAIN 25 tablet 0    Omega-3 1000 MG capsule Take 2 g by mouth as needed      polyethylene glycol (MIRALAX) 17 GM/Dose powder Take 17 g by mouth daily      rosuvastatin (CRESTOR) 40 MG tablet Take 1 tablet (40 mg) by mouth daily 90 tablet 3    sildenafil (REVATIO) 20 MG tablet Take 4 tablets (80 mg) by mouth daily as needed (Erectile dysfunction) Do not take within 24 hours of nitroglycerin or nitrate medication. 40 tablet 6     No Known Allergies    Reviewed and updated as needed this visit by clinical staff   Tobacco  Allergies  Meds              Reviewed and updated as needed this visit by Provider                 Past Medical History:   Diagnosis Date    Basal cell carcinoma of skin     Cancer (H)     Kidney    Coronary artery disease     Palpitations     Paroxysmal atrial fibrillation (H)     PONV (postoperative nausea and vomiting)     Sinus bradycardia       Past Surgical History:   Procedure Laterality Date    ANGIOPLASTY  2010    ANGIOPLASTY  2012    APPENDECTOMY      ARTHROSCOPY KNEE WITH MENISCECTOMY Left 07/01/2016    CV CORONARY ANGIOGRAM N/A 6/22/2018    Procedure: Coronary Angiogram;  Surgeon: Shanique Bourgeois MD;  Location: [facility] Cath Lab;  Service:     CV CORONARY ANGIOGRAM N/A 8/13/2020    Procedure: Coronary Angiogram;  Surgeon: Steve Neville MD;  Location: [facility] Cath Lab;  Service: Cardiology    CV CORONARY ANGIOGRAM N/A 2/11/2021    Procedure: Coronary  "Angiogram;  Surgeon: Keven Monroy MD;  Location: [facility] Cardiac Cath Lab;  Service: Cardiology    CV LEFT HEART CATHETERIZATION WITH LEFT VENTRICULOGRAM N/A 6/22/2018    Procedure: Left Heart Catheterization with Left Ventriculogram;  Surgeon: Shanique Bourgeois MD;  Location: [facility] Cath Lab;  Service:     FOOT FRACTURE SURGERY Right 02/15/2010    NEPHRECTOMY Left     REPAIR MOHS Bilateral 10/23/2019    Procedure: Mohs Reconstruction nasal, left cheek graft;  Surgeon: Alexei Stewart MD;  Location:  OR       Review of Systems   Constitutional:  Negative for chills and fever.   HENT:  Negative for congestion, ear pain, hearing loss and sore throat.    Eyes:  Negative for pain and visual disturbance.   Respiratory:  Negative for cough and shortness of breath.    Cardiovascular:  Negative for chest pain, palpitations and peripheral edema.   Gastrointestinal:  Negative for abdominal pain, constipation, diarrhea, heartburn, hematochezia and nausea.   Genitourinary:  Negative for dysuria, frequency, genital sores, hematuria, impotence, penile discharge and urgency.   Musculoskeletal:  Negative for arthralgias, joint swelling and myalgias.   Skin:  Negative for rash.   Neurological:  Negative for dizziness, weakness, headaches and paresthesias.   Psychiatric/Behavioral:  Negative for mood changes. The patient is not nervous/anxious.          OBJECTIVE:   /80 (BP Location: Right arm, Patient Position: Sitting)   Pulse 51   Temp 97.9  F (36.6  C)   Resp 16   Ht 1.765 m (5' 9.5\")   Wt 91.2 kg (201 lb)   SpO2 99%   BMI 29.26 kg/m      Physical Exam  Healthy-appearing male.  Alert and oriented x 3 with normal mood and affect.  Normal cognition.  Normal mobility.  Pupils and irises equal and reactive.  Extraocular muscles intact.  No jaundice or conjunctivitis.  External ears and nose exam is normal.  Does have some cerumen impaction on the left but the right is clear.  Pharynx appears " normal.  Teeth in good condition.  No cervical or supraclavicular or axillary adenopathy.  No JVD and no carotid bruits.  No thyroid nodularity or tenderness to inspection and palpation.  Lungs are clear to auscultation with good respiratory excursion.  Heart is regular with no murmur rub or gallop.  +1 pedal pulses and no ankle edema.  Abdomen is thin, nontender and no hepatosplenomegaly.  No pulsatile abdominal mass.  Skin exam shows some sun damage on his temples but I did not see a sclerotic lesion.  No melanotic type concerning skin lesions.  He does have a birthmark on his left forearm.  rectal exam shows smooth prostate, symmetric no nodules.  He declined anterior  exam    ASSESSMENT/PLAN:   (Z00.00) Healthcare maintenance  (primary encounter diagnosis)  Comment: Main focus for patient is cardiovascular risk.  But he remains asymptomatic with fairly good exercise tolerance but I did encourage him to maintain regular exercise at least a moderate level 4 times a week or more.  He has an elliptical machine and exercise bike at home.    Patient declines COVID-vaccine but otherwise up-to-date on immunizations.   supplement patient is full code.    Patient instructions:  Constant MicroportYour colonoscopy is due in July of this year.  You should be contacted by [facility] to set up your colonoscopy.  If you do not receive contact from [facility], contact me to send an order.    I would recommend that you continue on your aspirin 81 mg a day before the colonoscopy.  Stop the Plavix for 7 days before the colonoscopy and restart day after colonoscopy if no major bleeding.    Continue to get regular exercise.  20 minutes of aerobic type exercise 4 times a week or more is recommended.  If you have any difficulty exercising such as shortness of breath, chest pain, fluttering feeling, neck pain or other new symptoms, get evaluated right away.    You can continue your MiraLAX long-term.    See dermatology as planned  for your regular skin exam but I did not see concerning skin lesions today.    Continue to see ophthalmology for yearly glaucoma screening and eye exam.    You do have a cerumen impaction on the left.  Try Debrox earwax drops, but if that does not clear out on its own, would recommend you see the ENT clinic to remove the earwax.    Goal blood pressure less than 135/85 but not less than 110/60.  Continue on current amlodipine but contact me if your blood pressure is outside of that range more than occasionally.    See me in 1 year for Zickel exam, or sooner if needed for any issues.    (I25.10) Coronary artery disease involving native coronary artery of native heart without angina pectoris  Comment: Asymptomatic with fairly good exercise tolerance as above.  Patient was told if he has any new chest type symptoms or exertional symptoms to get evaluated right away.    See patient instructions for his upcoming colonoscopy in light of his extensive stenting and severe coronary artery disease, I recommended that he stay on aspirin for the colonoscopy, I did warn him on bleeding risk with staying on aspirin  Plan: clopidogrel (PLAVIX) 75 MG tablet, rosuvastatin        (CRESTOR) 40 MG tablet        Goal LDL less than 70.  But on high-dose rosuvastatin.  Could consider discussion with cardiology about Repatha or regulant in the future if he is still some adequately controlled on high-dose rosuvastatin.    (E78.5) Dyslipidemia, goal LDL below 70  Comment: As above  Plan: rosuvastatin (CRESTOR) 40 MG tablet, Lipid         Profile        As above    (I10) Essential hypertension  Comment: Controlled on low-dose amlodipine  Plan: amLODIPine (NORVASC) 2.5 MG tablet        See patient instructions for monitoring blood pressure    (M25.562,  G89.29) Chronic pain of left knee  Comment: Pain has resolved from the knee, history of hemarthrosis  Plan:     (Z85.828) History of basal cell carcinoma (BCC) of skin  Comment: No evidence of  "recurrence.  As a yearly skin exam scheduled soon, this winter patient was reminded to wear a hat and sunscreen  Plan:     (Z86.010) History of colonic polyps  Comment: 5-year colonoscopy due July 2024.  Plan: See above for recommendations on aspirin    (Z12.5) Screening for prostate cancer  Comment: Yearly screening  Plan: Prostate Specific Antigen Screen            (Z51.81) Encounter for therapeutic drug monitoring  Comment:   Plan: CBC with platelets, Comprehensive metabolic         panel      (H61.22) Impacted cerumen of left ear  Comment: Moderate cerumen, see patient instructions.  Plan:     Patient complains of some intermittent postnasal drip type phlegm, not progressive.  Lungs were clear.  Patient was told to follow-up in clinic if any worsening cough or respiratory symptoms.    He does have a wart on his left foot that is improving with over-the-counter salicylic acid treatment.  He can follow-up with dermatology or podiatry in the future if needed.        COUNSELING:   Reviewed preventive health counseling, as reflected in patient instructions       Regular exercise       Healthy diet/nutrition       Vision screening      BMI:   Estimated body mass index is 29.26 kg/m  as calculated from the following:    Height as of this encounter: 1.765 m (5' 9.5\").    Weight as of this encounter: 91.2 kg (201 lb).         He reports that he has never smoked. He has never been exposed to tobacco smoke. He has never used smokeless tobacco.            Aki Bolaños MD  [facility]  "

## 2024-01-05 NOTE — COMMUNITY RESOURCES LIST (ENGLISH)
01/05/2024   [facility]  N/A  For additional resource needs, please contact your health insurance member services or your primary care team.  Phone: [phone]   Email: N/A   Address: [facility] [place] [address]   Hours: N/A        Financial Stability       Utility payment assistance  1  [place] [employer] - [place]'s Telephone Assistance Plan (TAP) and Federal Lifeline and Affordable Connectivity Program (ACP) Distance: 3.59 miles      Phone/Virtual   [address] [place] [address]  Language: English  Fees: Free   Phone: (203) 512-7036 Email: consumer.puc@[facility].mn. Website: https://[place].gov/puc/consumers/telephone/     2  [place] [employer] - Energy and Utilities Distance: 5.32 miles      In-Person, Phone/Virtual   [address] [place] [address]  Language: English  Hours: Mon - Fri 8:30 AM - 4:30 PM  Fees: Free   Phone: (950) 457-1581 Website: https://[place].gov/[employer]/energy/consumer-assistance/energy-assistance-program/          Important Numbers & Websites       [facility]   211 211itedway.org  Poison Control   (522) 548-9517 [place]poison.[place]  Suicide and Crisis Lifeline   988 8[facility]  Childhelp [place] Child Abuse Hotline   924.404.9600 Childhelphotline.org  National Sexual Assault Hotline   (200) 211-1948 (HOPE) Rainn.org  National Runaway Safeline   (928) 262-9926 (RUNAWAY) 1800runaway.org  Pregnancy & Postpartum Support [place]   Call/text 835-530-9281 Ppsupportmn.org  Substance Abuse National Helpline ([facility]   660-816-HELP (0758) Findtreatment.gov  Emergency Services   911

## 2024-01-06 LAB
ALBUMIN SERPL BCG-MCNC: 4.2 G/DL (ref 3.5–5.2)
ALP SERPL-CCNC: 69 U/L (ref 40–150)
ALT SERPL W P-5'-P-CCNC: 35 U/L (ref 0–70)
ANION GAP SERPL CALCULATED.3IONS-SCNC: 8 MMOL/L (ref 7–15)
AST SERPL W P-5'-P-CCNC: 35 U/L (ref 0–45)
BILIRUB SERPL-MCNC: 0.7 MG/DL
BUN SERPL-MCNC: 13.9 MG/DL (ref 8–23)
CALCIUM SERPL-MCNC: 9.7 MG/DL (ref 8.8–10.2)
CHLORIDE SERPL-SCNC: 106 MMOL/L (ref 98–107)
CHOLEST SERPL-MCNC: 146 MG/DL
CREAT SERPL-MCNC: 0.87 MG/DL (ref 0.67–1.17)
DEPRECATED HCO3 PLAS-SCNC: 27 MMOL/L (ref 22–29)
EGFRCR SERPLBLD CKD-EPI 2021: >90 ML/MIN/1.73M2
FASTING STATUS PATIENT QL REPORTED: NORMAL
GLUCOSE SERPL-MCNC: 90 MG/DL (ref 70–99)
HDLC SERPL-MCNC: 50 MG/DL
LDLC SERPL CALC-MCNC: 80 MG/DL
NONHDLC SERPL-MCNC: 96 MG/DL
POTASSIUM SERPL-SCNC: 4.5 MMOL/L (ref 3.4–5.3)
PROT SERPL-MCNC: 6.9 G/DL (ref 6.4–8.3)
PSA SERPL DL<=0.01 NG/ML-MCNC: 0.15 NG/ML (ref 0–4.5)
SODIUM SERPL-SCNC: 141 MMOL/L (ref 135–145)
TRIGL SERPL-MCNC: 80 MG/DL

## 2024-02-25 ENCOUNTER — OFFICE VISIT (OUTPATIENT)
Dept: FAMILY MEDICINE | Facility: CLINIC | Age: 69
End: 2024-02-25
Payer: COMMERCIAL

## 2024-02-25 VITALS
DIASTOLIC BLOOD PRESSURE: 82 MMHG | SYSTOLIC BLOOD PRESSURE: 133 MMHG | RESPIRATION RATE: 16 BRPM | OXYGEN SATURATION: 98 % | HEART RATE: 50 BPM | TEMPERATURE: 98.4 F

## 2024-02-25 DIAGNOSIS — H10.021 PINK EYE DISEASE OF RIGHT EYE: Primary | ICD-10-CM

## 2024-02-25 PROCEDURE — 99214 OFFICE O/P EST MOD 30 MIN: CPT | Performed by: PREVENTIVE MEDICINE

## 2024-02-25 RX ORDER — POLYMYXIN B SULFATE AND TRIMETHOPRIM 1; 10000 MG/ML; [USP'U]/ML
1 SOLUTION OPHTHALMIC EVERY 6 HOURS
Qty: 10 ML | Refills: 0 | Status: SHIPPED | OUTPATIENT
Start: 2024-02-25 | End: 2024-02-25

## 2024-02-25 RX ORDER — POLYMYXIN B SULFATE AND TRIMETHOPRIM 1; 10000 MG/ML; [USP'U]/ML
1 SOLUTION OPHTHALMIC EVERY 6 HOURS
Qty: 10 ML | Refills: 0 | Status: SHIPPED | OUTPATIENT
Start: 2024-02-25

## 2024-02-25 NOTE — PROGRESS NOTES
Assessment & Plan     (H10.021) Pink eye disease of right eye  (primary encounter diagnosis)  Plan: polymixin b-trimethoprim (POLYTRIM) 61418-4.1         UNIT/ML-% ophthalmic solution, DISCONTINUED:     Polytrim for 7 days    See eye md if not improving in next 5-7 days     31 minutes spent by me on the date of the encounter doing chart review, history and exam, documentation and further activities per the note        No follow-ups on file.    Perez Hall MD  [facility] U[facility]ENT [facility]    Subjective     Jose Lynn is a 68 year old year old male who presents to clinic today for the following health issues:    Patient presents with:  Eye Problem: Right eye red for about a week or two    This is a 69 yo man with right eye irritation for several days.  No known foreign body.  Does not wear contacts.  No problems with left eye.  Some congestion recently.  No fever, sore throat, ear pain, cough, sob, cp.  No pain in eye.  No headache.  Vision is normal.    Patient Active Problem List   Diagnosis    Dyslipidemia, goal LDL below 70    Coronary Artery Disease    Kidney Cancer    Basal Cell Carcinoma Of The Skin    Palpitations    Paroxysmal atrial fibrillation (H)    Sinus bradycardia    Abnormal stress test    Benign neoplasm of ascending colon    Diaphragmatic hernia    Disorder of function of stomach    Diverticular disease of large intestine    Gastric ulcer without hemorrhage or perforation    Polyp of colon       Current Outpatient Medications   Medication    amLODIPine (NORVASC) 2.5 MG tablet    ASPIRIN EC PO    cholecalciferol 25 MCG (1000 UT) TABS    clopidogrel (PLAVIX) 75 MG tablet    multivitamin w/minerals (THERA-VIT-M) tablet    nitroGLYcerin (NITROSTAT) 0.4 MG sublingual tablet    Omega-3 1000 MG capsule    polyethylene glycol (MIRALAX) 17 GM/Dose powder    rosuvastatin (CRESTOR) 40 MG tablet    sildenafil (REVATIO) 20 MG tablet     No current facility-administered medications for  this visit.       Past Medical History:   Diagnosis Date    Basal cell carcinoma of skin     Cancer (H)     Kidney    Coronary artery disease     Palpitations     Paroxysmal atrial fibrillation (H)     PONV (postoperative nausea and vomiting)     Sinus bradycardia        Social History   reports that he has never smoked. He has never been exposed to tobacco smoke. He has never used smokeless tobacco. He reports that he does not drink alcohol and does not use drugs.    Family History   Problem Relation Age of Onset    Hypertension Father     Cerebrovascular Disease Paternal Grandmother     Cancer No family hx of     Diabetes No family hx of     Thyroid Disease No family hx of     Glaucoma No family hx of     Macular Degeneration No family hx of     Coronary Artery Disease Father     No Known Problems Mother     No Known Problems Brother     Hypertension Sister        Review of Systems  Constitutional, HEENT, cardiovascular, pulmonary, GI, , musculoskeletal, neuro, skin, endocrine and psych systems are negative, except as otherwise noted.      Objective    /82   Pulse 50   Temp 98.4  F (36.9  C) (Oral)   Resp 16   SpO2 98%   Physical Exam   GENERAL: alert and no distress  EYES: Eyes grossly normal to inspection, PERRL and conjunctivae and sclerae normal  HENT: ear canals and TM's normal, nose and mouth without ulcers or lesions  NECK: no adenopathy, no asymmetry, masses, or scars  RESP: lungs clear to auscultation - no rales, rhonchi or wheezes  CV: regular rate and rhythm, normal S1 S2, no S3 or S4, no murmur, click or rub, no peripheral edema  ABDOMEN: soft, nontender, no hepatosplenomegaly, no masses and bowel sounds normal  MS: no gross musculoskeletal defects noted, no edema  SKIN: no suspicious lesions or rashes  NEURO: Normal strength and tone, mentation intact and speech normal  PSYCH: mentation appears normal, affect normal/bright

## 2024-04-18 ENCOUNTER — PATIENT OUTREACH (OUTPATIENT)
Dept: GASTROENTEROLOGY | Facility: CLINIC | Age: 69
End: 2024-04-18
Payer: COMMERCIAL

## 2024-04-18 NOTE — PROGRESS NOTES
PCP and patient discussed pt going to [facility] for colonoscopy screenings. Will decline  [facility] management of colorectal screening.

## 2024-05-09 DIAGNOSIS — N52.9 ERECTILE DYSFUNCTION, UNSPECIFIED ERECTILE DYSFUNCTION TYPE: ICD-10-CM

## 2024-05-09 RX ORDER — SILDENAFIL CITRATE 20 MG/1
80 TABLET ORAL DAILY PRN
Qty: 40 TABLET | Refills: 6 | Status: SHIPPED | OUTPATIENT
Start: 2024-05-09

## 2024-08-14 ENCOUNTER — TRANSFERRED RECORDS (OUTPATIENT)
Dept: MULTI SPECIALTY CLINIC | Facility: CLINIC | Age: 69
End: 2024-08-14

## 2024-08-14 LAB — RETINOPATHY: NORMAL

## 2024-12-31 DIAGNOSIS — E78.5 DYSLIPIDEMIA, GOAL LDL BELOW 70: ICD-10-CM

## 2024-12-31 DIAGNOSIS — I25.10 CORONARY ARTERY DISEASE INVOLVING NATIVE CORONARY ARTERY OF NATIVE HEART WITHOUT ANGINA PECTORIS: ICD-10-CM

## 2024-12-31 RX ORDER — ROSUVASTATIN CALCIUM 40 MG/1
40 TABLET, COATED ORAL DAILY
Qty: 90 TABLET | Refills: 3 | Status: SHIPPED | OUTPATIENT
Start: 2024-12-31

## 2025-01-01 DIAGNOSIS — I10 ESSENTIAL HYPERTENSION: ICD-10-CM

## 2025-01-02 RX ORDER — AMLODIPINE BESYLATE 2.5 MG/1
2.5 TABLET ORAL DAILY
Qty: 90 TABLET | Refills: 0 | Status: SHIPPED | OUTPATIENT
Start: 2025-01-02

## 2025-01-25 SDOH — HEALTH STABILITY: PHYSICAL HEALTH: ON AVERAGE, HOW MANY MINUTES DO YOU ENGAGE IN EXERCISE AT THIS LEVEL?: 20 MIN

## 2025-01-25 SDOH — HEALTH STABILITY: PHYSICAL HEALTH: ON AVERAGE, HOW MANY DAYS PER WEEK DO YOU ENGAGE IN MODERATE TO STRENUOUS EXERCISE (LIKE A BRISK WALK)?: 7 DAYS

## 2025-01-25 ASSESSMENT — SOCIAL DETERMINANTS OF HEALTH (SDOH): HOW OFTEN DO YOU GET TOGETHER WITH FRIENDS OR RELATIVES?: THREE TIMES A WEEK

## 2025-01-30 ENCOUNTER — OFFICE VISIT (OUTPATIENT)
Dept: INTERNAL MEDICINE | Facility: CLINIC | Age: 70
End: 2025-01-30
Payer: COMMERCIAL

## 2025-01-30 VITALS
TEMPERATURE: 97.5 F | DIASTOLIC BLOOD PRESSURE: 82 MMHG | HEART RATE: 52 BPM | RESPIRATION RATE: 16 BRPM | BODY MASS INDEX: 29.92 KG/M2 | WEIGHT: 202 LBS | HEIGHT: 69 IN | OXYGEN SATURATION: 97 % | SYSTOLIC BLOOD PRESSURE: 126 MMHG

## 2025-01-30 DIAGNOSIS — Z85.828 HISTORY OF SKIN CANCER: ICD-10-CM

## 2025-01-30 DIAGNOSIS — I25.10 CORONARY ARTERY DISEASE DUE TO CALCIFIED CORONARY LESION: ICD-10-CM

## 2025-01-30 DIAGNOSIS — E78.00 HYPERCHOLESTEROLEMIA: ICD-10-CM

## 2025-01-30 DIAGNOSIS — Z86.0100 HISTORY OF COLONIC POLYPS: ICD-10-CM

## 2025-01-30 DIAGNOSIS — I25.84 CORONARY ARTERY DISEASE DUE TO CALCIFIED CORONARY LESION: ICD-10-CM

## 2025-01-30 DIAGNOSIS — Z12.5 SCREENING FOR PROSTATE CANCER: ICD-10-CM

## 2025-01-30 DIAGNOSIS — N52.9 ERECTILE DYSFUNCTION, UNSPECIFIED ERECTILE DYSFUNCTION TYPE: ICD-10-CM

## 2025-01-30 DIAGNOSIS — Z23 NEED FOR IMMUNIZATION AGAINST INFLUENZA: ICD-10-CM

## 2025-01-30 DIAGNOSIS — I10 ESSENTIAL HYPERTENSION: ICD-10-CM

## 2025-01-30 DIAGNOSIS — Z51.81 ENCOUNTER FOR THERAPEUTIC DRUG MONITORING: ICD-10-CM

## 2025-01-30 DIAGNOSIS — Z00.00 HEALTHCARE MAINTENANCE: Primary | ICD-10-CM

## 2025-01-30 PROBLEM — I48.0 PAROXYSMAL ATRIAL FIBRILLATION (H): Status: RESOLVED | Noted: 2018-11-25 | Resolved: 2025-01-30

## 2025-01-30 LAB
ALBUMIN SERPL BCG-MCNC: 4.2 G/DL (ref 3.5–5.2)
ALP SERPL-CCNC: 72 U/L (ref 40–150)
ALT SERPL W P-5'-P-CCNC: 33 U/L (ref 0–70)
ANION GAP SERPL CALCULATED.3IONS-SCNC: 7 MMOL/L (ref 7–15)
AST SERPL W P-5'-P-CCNC: 32 U/L (ref 0–45)
BILIRUB SERPL-MCNC: 0.6 MG/DL
BUN SERPL-MCNC: 13.6 MG/DL (ref 8–23)
CALCIUM SERPL-MCNC: 10 MG/DL (ref 8.8–10.4)
CHLORIDE SERPL-SCNC: 105 MMOL/L (ref 98–107)
CHOLEST SERPL-MCNC: 153 MG/DL
CREAT SERPL-MCNC: 0.91 MG/DL (ref 0.67–1.17)
EGFRCR SERPLBLD CKD-EPI 2021: >90 ML/MIN/1.73M2
ERYTHROCYTE [DISTWIDTH] IN BLOOD BY AUTOMATED COUNT: 12.2 % (ref 10–15)
FASTING STATUS PATIENT QL REPORTED: YES
FASTING STATUS PATIENT QL REPORTED: YES
GLUCOSE SERPL-MCNC: 91 MG/DL (ref 70–99)
HCO3 SERPL-SCNC: 28 MMOL/L (ref 22–29)
HCT VFR BLD AUTO: 36.5 % (ref 40–53)
HDLC SERPL-MCNC: 45 MG/DL
HGB BLD-MCNC: 12.5 G/DL (ref 13.3–17.7)
LDLC SERPL CALC-MCNC: 86 MG/DL
MCH RBC QN AUTO: 30.4 PG (ref 26.5–33)
MCHC RBC AUTO-ENTMCNC: 34.2 G/DL (ref 31.5–36.5)
MCV RBC AUTO: 89 FL (ref 78–100)
NONHDLC SERPL-MCNC: 108 MG/DL
PLATELET # BLD AUTO: 170 10E3/UL (ref 150–450)
POTASSIUM SERPL-SCNC: 4.2 MMOL/L (ref 3.4–5.3)
PROT SERPL-MCNC: 6.9 G/DL (ref 6.4–8.3)
PSA SERPL DL<=0.01 NG/ML-MCNC: 0.12 NG/ML (ref 0–6.5)
RBC # BLD AUTO: 4.11 10E6/UL (ref 4.4–5.9)
SODIUM SERPL-SCNC: 140 MMOL/L (ref 135–145)
TRIGL SERPL-MCNC: 109 MG/DL
WBC # BLD AUTO: 4.4 10E3/UL (ref 4–11)

## 2025-01-30 RX ORDER — SILDENAFIL CITRATE 20 MG/1
80 TABLET ORAL DAILY PRN
Qty: 40 TABLET | Refills: 6 | Status: CANCELLED | OUTPATIENT
Start: 2025-01-30

## 2025-01-30 RX ORDER — TADALAFIL 10 MG/1
10 TABLET ORAL DAILY PRN
Qty: 10 TABLET | Refills: 5 | Status: SHIPPED | OUTPATIENT
Start: 2025-01-30

## 2025-01-30 SDOH — HEALTH STABILITY: PHYSICAL HEALTH: ON AVERAGE, HOW MANY DAYS PER WEEK DO YOU ENGAGE IN MODERATE TO STRENUOUS EXERCISE (LIKE A BRISK WALK)?: 7 DAYS

## 2025-01-30 SDOH — HEALTH STABILITY: PHYSICAL HEALTH: ON AVERAGE, HOW MANY MINUTES DO YOU ENGAGE IN EXERCISE AT THIS LEVEL?: 20 MIN

## 2025-01-30 ASSESSMENT — SOCIAL DETERMINANTS OF HEALTH (SDOH): HOW OFTEN DO YOU GET TOGETHER WITH FRIENDS OR RELATIVES?: THREE TIMES A WEEK

## 2025-01-30 NOTE — PATIENT INSTRUCTIONS
Proceed with colonoscopy.  You will need to stop your Plavix for 7 days before the colonoscopy, and restart day after colonoscopy.  I would recommend you stay on your aspirin 81 mg a day for the colonoscopy.    See your dermatologist as planned this winter.  You do have a small skin lesion on your right temple.    You can try tadalafil instead of the sildenafil.  Be aware there is a strong warning drug interaction between nitroglycerin and tadalafil/sildenafil.  Do not take nitroglycerin within 24 hours of your erectile dysfunction pill, as it can cause severe low blood pressure.    Continue with your regular exercise.  Regular exercise is important for maintaining the body's health.  Get at least 20 minutes of exercise 4 times a week or more.    Goal blood pressure less than 135/85.

## 2025-01-30 NOTE — PROGRESS NOTES
Preventive Care Visit  [facility]  Aki Bolaños MD, Internal Medicine  Jan 30, 2025          Jose Lynn   69 year old male    Date of Visit: 1/30/2025    Chief Complaint   Patient presents with    Physical     Subjective  69-year-old male still working as a , here with wife, Morena.  Remains active with exercise bike at least 4 times a week with good exercise tolerance.  No pain issues that limit activity.    Significant coronary disease with stents to the LAD in 2010, OM in 2012, PTCA of the RCA in 2020.    Stress test in 2021 showed a small nontransmural infarct in the basal inferior wall.    Catheterization in 2021 showed patent stents and no intervention and medical management since.    He is on aspirin and Plavix without bleeding issues or history of ulcer.  On rosuvastatin 40 mg with well-controlled cholesterol no generalized myalgia complaints.    No history of sleep apnea.  Does not drink alcohol.  He did have an episode of paroxysmal atrial fibrillation back in 2018 but no recurrence.    Blood pressure has been controlled but he does not check blood pressure.  Denies orthostasis or edema.  On amlodipine 2.5 mg a day.    Congenital atrophy of kidney and left hypernephroma removed back in the 90s.    1 times a night nocturia.    Erectile dysfunction but an adequate benefit with the Viagra.    He does not use nitroglycerin.    Bowels are regular without blood in stool.  No abdominal pain.  July 2019 colonoscopy with a 3 mm polyp.  He still has not set up his colonoscopy, 5-year colonoscopy overdue.    Past history of melanoma and basal cell cancer.  No new skin lesions noted.  He has noted yearly dermatology appointment soon, next month.    He saw ophthalmology last summer and reports a normal eye exam.  Denies headaches or vision changes.    His 10 grandchildren are doing well    PMHx:    Past Medical History:   Diagnosis Date    Basal cell carcinoma of skin     Cancer  (H)     Kidney    Coronary artery disease     Palpitations     Paroxysmal atrial fibrillation (H)     PONV (postoperative nausea and vomiting)     Sinus bradycardia      PSHx:    Past Surgical History:   Procedure Laterality Date    ANGIOPLASTY  2010    ANGIOPLASTY  2012    APPENDECTOMY      ARTHROSCOPY KNEE WITH MENISCECTOMY Left 07/01/2016    CV CORONARY ANGIOGRAM N/A 6/22/2018    Procedure: Coronary Angiogram;  Surgeon: Shanique Bourgeois MD;  Location: [facility];  Service:     CV CORONARY ANGIOGRAM N/A 8/13/2020    Procedure: Coronary Angiogram;  Surgeon: Steve Neville MD;  Location: [facility];  Service: Cardiology    CV CORONARY ANGIOGRAM N/A 2/11/2021    Procedure: Coronary Angiogram;  Surgeon: Keven Monroy MD;  Location: [facility];  Service: Cardiology    CV LEFT HEART CATHETERIZATION WITH LEFT VENTRICULOGRAM N/A 6/22/2018    Procedure: Left Heart Catheterization with Left Ventriculogram;  Surgeon: Shanique Bourgeois MD;  Location: [facility];  Service:     FOOT FRACTURE SURGERY Right 02/15/2010    NEPHRECTOMY Left     REPAIR MOHS Bilateral 10/23/2019    Procedure: Mohs Reconstruction nasal, left cheek graft;  Surgeon: Alexei Stewart MD;  Location: [facility] OR     Immunizations:   Immunization History   Administered Date(s) Administered    COVID-19 Monovalent 18+ (Moderna) 02/13/2021, 03/13/2021    DT (PEDS <7y) 01/01/1994, 04/22/2003    HepA-Peds, Unspecified 07/10/2000, 04/22/2004    Influenza (prior to 2024) 10/04/2011, 09/23/2013, 10/21/2014    Influenza Vaccine 18-64 (Flublok) 10/11/2019    Influenza Vaccine 65+ (FLUAD) 12/31/2020, 01/04/2023    Influenza Vaccine 65+ (Fluzone HD) 11/06/2023    Influenza Vaccine, 6+MO IM (QUADRIVALENT W/PRESERVATIVES) 10/25/2018    Pneumo Conj 13-V (2010&after) 07/09/2020    Pneumococcal 23 valent 06/04/2021    TDAP (Adacel,Boostrix) 12/05/2012, 10/15/2022    Td (Adult), Adsorbed 07/21/1994, 04/22/2003,  "12/31/2004    Td,adult,historic,unspecified 04/22/2003, 12/05/2012    Typhoid, Unspecified Formulation 04/22/2004    Zoster recombinant adjuvanted (SHINGRIX) 12/31/2020, 04/17/2021    Zoster vaccine, live 10/17/2016       ROS A comprehensive review of systems was performed and was otherwise negative    Medications, allergies, and problem list were reviewed and updated    Exam  /82   Pulse 52   Temp 97.5  F (36.4  C)   Resp 16   Ht 1.76 m (5' 9.29\")   Wt 91.6 kg (202 lb)   SpO2 97%   BMI 29.58 kg/m    Healthy-appearing male.  Clock face drawing and word recall normal.  Mood and affect normal.  Normal mobility.  Pupils and irises equal and reactive.  Extraocular muscles intact.  No jaundice or conjunctivitis.  External ears and nose exam is normal.  Minimal cerumen and normal tympanic membranes.  He has a very small actinic keratosis on his right temple that was noted the patient.  No other suspicious skin lesions noted.  Oral mucosa appears normal.  Teeth in good condition.  No cervical or supraclavicular or axillary adenopathy.  No JVD and no carotid bruits.  No thyromegaly or nodularity to inspection and palpation.  Lungs clear to auscultation with good respiratory excursion.  Heart is regular with no murmur rub or gallop.  +2 posterior tibialis pulses bilaterally and no ankle edema.  Abdomen is nonobese nontender no hepatosplenomegaly and no pulsatile abdominal mass.  Rectal exam shows a smooth symmetric prostate, normal rectal exam.    Assessment/Plan  1. Healthcare maintenance (Primary)  Main focus for patient is cardiovascular risk.  Continue regular exercise.  He remains asymptomatic.  Continue medical management.    See patient instructions for health maintenance plan.    He declines COVID-vaccine    2. Essential hypertension  Controlled on amlodipine 2.5 mg a day    3. Coronary artery disease due to calcified coronary lesion  Asymptomatic with good exercise tolerance.    I did discuss risk of " in-stent thrombosis and heart attack with coming off anticoagulation for colonoscopy.  I did discuss option of not doing colonoscopy but with risk of undiagnosed colon cancer.  Patient does wish to proceed with colonoscopy.  I did recommend that he stay on the aspirin 81 mg a day because of his multiple stents.  I did discuss bleeding risk would be increased with the use of aspirin, but cardiovascular risk would be decreased with staying on the aspirin.  Patient will plan to stay on aspirin for his colonoscopy but come off the Plavix for 7 days.    Continue rosuvastatin 40 mg with a goal LDL less than 80    4. Screening for prostate cancer  Yearly screening  - Prostate Specific Antigen Screen    5. History of colonic polyps  5-year colonoscopy overdue as above.  Stay on aspirin but hold Plavix for 7 days  - Colonoscopy Screening  Referral; Future    6. History of skin cancer  Sclerotic lesion on the right temple noted the patient.  History of basal cell and melanoma.  Sees dermatology soon, this winter    7. Encounter for therapeutic drug monitoring    - CBC with platelets  - Comprehensive metabolic panel    8. Hypercholesterolemia    - Lipid panel reflex to direct LDL Fasting    9. Need for immunization against influenza  Given today  - INFLUENZA HIGH DOSE, TRIVALENT, PF (FLUZONE)    10. Erectile dysfunction, unspecified erectile dysfunction type  Sildenafil has not been effective for patient.  He does wish to use a longer acting medication.  He was given warnings on nitroglycerin interaction.  I also discussed side effect risk of heartburn, headaches and nasal congestion.  - tadalafil (CIALIS) 10 MG tablet; Take 1 tablet (10 mg) by mouth daily as needed (Erectile dysfunction).  Dispense: 10 tablet; Refill: 5      Return in about 1 year (around 1/30/2026) for Health maintenance physical exam.   Patient Instructions   Proceed with colonoscopy.  You will need to stop your Plavix for 7 days before the  colonoscopy, and restart day after colonoscopy.  I would recommend you stay on your aspirin 81 mg a day for the colonoscopy.    See your dermatologist as planned this winter.  You do have a small skin lesion on your right temple.    You can try tadalafil instead of the sildenafil.  Be aware there is a strong warning drug interaction between nitroglycerin and tadalafil/sildenafil.  Do not take nitroglycerin within 24 hours of your erectile dysfunction pill, as it can cause severe low blood pressure.    Continue with your regular exercise.  Regular exercise is important for maintaining the body's health.  Get at least 20 minutes of exercise 4 times a week or more.    Goal blood pressure less than 135/85.        Aki Bolaños MD, MD        Current Outpatient Medications   Medication Sig Dispense Refill    amLODIPine (NORVASC) 2.5 MG tablet TAKE ONE TABLET BY MOUTH ONE TIME DAILY 90 tablet 0    ASPIRIN EC PO Take 81 mg by mouth daily       cholecalciferol 25 MCG (1000 UT) TABS Take 2,000 Units by mouth      clopidogrel (PLAVIX) 75 MG tablet Take 1 tablet (75 mg) by mouth daily 90 tablet 3    multivitamin w/minerals (THERA-VIT-M) tablet Take 1 tablet by mouth daily      nitroGLYcerin (NITROSTAT) 0.4 MG sublingual tablet DISSOLVE ONE TABLET UNDER TONGUE EVERY 5 MINUTES IF NEEDED FOR CHEST PAIN 25 tablet 0    Omega-3 1000 MG capsule Take 2 g by mouth as needed      polyethylene glycol (MIRALAX) 17 GM/Dose powder Take 17 g by mouth daily      rosuvastatin (CRESTOR) 40 MG tablet TAKE ONE TABLET BY MOUTH ONE TIME DAILY 90 tablet 3    sildenafil (REVATIO) 20 MG tablet Take 4 tablets (80 mg) by mouth daily as needed (Erectile dysfunction) Do not take within 24 hours of nitroglycerin or nitrate medication. 40 tablet 6    tadalafil (CIALIS) 10 MG tablet Take 1 tablet (10 mg) by mouth daily as needed (Erectile dysfunction). 10 tablet 5     No Known Allergies  Social History     Tobacco Use    Smoking status: Never     Passive  exposure: Never    Smokeless tobacco: Never   Vaping Use    Vaping status: Never Used   Substance Use Topics    Alcohol use: No    Drug use: No             Subjective   Jose is a 69 year old, presenting for the following:  Physical        1/30/2025    10:10 AM   Additional Questions   Roomed by Shanique EDWARD         Via the Health Maintenance questionnaire, the patient has reported the following services have been completed -Eye Exam: [facility] 2024-08-14, this information has been sent to the abstraction team.    HPI          Health Care Directive  Patient does not have a Health Care Directive: Patient states has Advance Directive and will bring in a copy to clinic.      1/30/2025   General Health   How would you rate your overall physical health? Good   Feel stress (tense, anxious, or unable to sleep) Not at all         1/30/2025   Nutrition   Diet: Regular (no restrictions)         1/30/2025   Exercise   Days per week of moderate/strenous exercise 7 days   Average minutes spent exercising at this level 20 min         1/30/2025   Social Factors   Frequency of gathering with friends or relatives Three times a week   Worry food won't last until get money to buy more No   Food not last or not have enough money for food? No   Do you have housing? (Housing is defined as stable permanent housing and does not include staying ouside in a car, in a tent, in an abandoned building, in an overnight shelter, or couch-surfing.) Yes   Are you worried about losing your housing? No   Lack of transportation? No   Unable to get utilities (heat,electricity)? No         1/30/2025   Fall Risk   Fallen 2 or more times in the past year? No    Trouble with walking or balance? No        Proxy-reported          1/30/2025   Activities of Daily Living- Home Safety   Needs help with the following daily activites None of the above   Safety concerns in the home None of the above         1/30/2025   Dental   Dentist two times every year? Yes          1/30/2025   Hearing Screening   Hearing concerns? None of the above         1/30/2025   Driving Risk Screening   Patient/family members have concerns about driving No         1/30/2025   General Alertness/Fatigue Screening   Have you been more tired than usual lately? No         1/30/2025   Urinary Incontinence Screening   Bothered by leaking urine in past 6 months No            Today's PHQ-2 Score:       1/30/2025    10:01 AM   PHQ-2 ( 1999 Pf[employer])   Q1: Little interest or pleasure in doing things 0    Q2: Feeling down, depressed or hopeless 0    PHQ-2 Score 0    Q1: Little interest or pleasure in doing things Not at all   Q2: Feeling down, depressed or hopeless Not at all   PHQ-2 Score 0       Proxy-reported           1/30/2025   Substance Use   Alcohol more than 3/day or more than 7/wk No   Do you have a current opioid prescription? No   How severe/bad is pain from 1 to 10? 0/10 (No Pain)   Do you use any other substances recreationally? No     Social History     Tobacco Use    Smoking status: Never     Passive exposure: Never    Smokeless tobacco: Never   Vaping Use    Vaping status: Never Used   Substance Use Topics    Alcohol use: No    Drug use: No           1/30/2025   AAA Screening   Family history of Abdominal Aortic Aneurysm (AAA)? No   ASCVD Risk   The 10-year ASCVD risk score (Christoph BRYANT, et al., 2019) is: 17.5%    Values used to calculate the score:      Age: 69 years      Sex: Male      Is Non- : No      Diabetic: No      Tobacco smoker: No      Systolic Blood Pressure: 126 mmHg      Is BP treated: Yes      HDL Cholesterol: 50 mg/dL      Total Cholesterol: 146 mg/dL            Reviewed and updated as needed this visit by Provider                      Current providers sharing in care for this patient include:  Patient Care Team:  Aki Bolaños MD as PCP - General (Internal Medicine)  Janette Johnson MD as Assigned Heart and Vascular Provider  Aki Bolaños MD as  "Assigned PCP    The following health maintenance items are reviewed in Epic and correct as of today:  Health Maintenance   Topic Date Due    ANNUAL REVIEW OF HM ORDERS  Never done    RSV VACCINE (1 - Risk 60-74 years 1-dose series) Never done    EYE EXAM  03/06/2024    COLORECTAL CANCER SCREENING  07/19/2024    INFLUENZA VACCINE (1) 09/01/2024    COVID-19 Vaccine (3 - 2024-25 season) 09/01/2024    BMP  01/05/2025    LIPID  01/05/2025    MEDICARE ANNUAL WELLNESS VISIT  01/05/2025    FALL RISK ASSESSMENT  01/30/2026    GLUCOSE  01/05/2027    ADVANCE CARE PLANNING  01/04/2028    DTAP/TDAP/TD IMMUNIZATION (3 - Td or Tdap) 10/15/2032    HEPATITIS C SCREENING  Completed    PHQ-2 (once per calendar year)  Completed    Pneumococcal Vaccine: 50+ Years  Completed    ZOSTER IMMUNIZATION  Completed    HPV IMMUNIZATION  Aged Out    MENINGITIS IMMUNIZATION  Aged Out    RSV MONOCLONAL ANTIBODY  Aged Out            Objective    Exam  /82   Pulse 52   Temp 97.5  F (36.4  C)   Resp 16   Ht 1.76 m (5' 9.29\")   Wt 91.6 kg (202 lb)   SpO2 97%   BMI 29.58 kg/m     Estimated body mass index is 29.58 kg/m  as calculated from the following:    Height as of this encounter: 1.76 m (5' 9.29\").    Weight as of this encounter: 91.6 kg (202 lb).    Physical Exam           1/30/2025   Mini Cog   Clock Draw Score 2 Normal    2 Normal   3 Item Recall 3 objects recalled   Mini Cog Total Score 5       Multiple values from one day are sorted in reverse-chronological order              Signed Electronically by: Aki Bolaños MD    "

## 2025-02-18 DIAGNOSIS — I25.10 CORONARY ARTERY DISEASE INVOLVING NATIVE CORONARY ARTERY OF NATIVE HEART WITHOUT ANGINA PECTORIS: ICD-10-CM

## 2025-02-19 RX ORDER — CLOPIDOGREL BISULFATE 75 MG/1
75 TABLET ORAL DAILY
Qty: 90 TABLET | Refills: 3 | Status: SHIPPED | OUTPATIENT
Start: 2025-02-19

## 2025-04-01 DIAGNOSIS — I10 ESSENTIAL HYPERTENSION: ICD-10-CM

## 2025-04-01 RX ORDER — AMLODIPINE BESYLATE 2.5 MG/1
2.5 TABLET ORAL DAILY
Qty: 90 TABLET | Refills: 2 | Status: SHIPPED | OUTPATIENT
Start: 2025-04-01

## 2025-05-01 ENCOUNTER — TRANSFERRED RECORDS (OUTPATIENT)
Dept: ADMINISTRATIVE | Facility: CLINIC | Age: 70
End: 2025-05-01
Payer: COMMERCIAL

## 2025-05-01 NOTE — PROCEDURES
[facility]   [address], [address], [place] [address]     Patient Name: Jose Lynn  Gender:   Male  Exam Date: 05/01/2025 Visit Number:   [id number]  Age: 69 Years 7 Months YOB: 1955  Attending MD: Bart Suggs MD Medical Record#:   [id number]  -----------------------------------------------------------------------------------------------------------------------------   Procedure: Colonoscopy   Indications: Previous adenomatous polyp(s), high risk colorectal cancer screening   1 diminutive adenomatous polyp removed at last colonoscopy in 2019   Referring MD: Referral Self  Primary MD:      Aki Bolaños MD   Medications: Admitting Medications:   0.9% Normal Saline at [facility]   Intra Procedure Medications:   Patient received monitored anesthesia care.     Complications: No immediate complications  ______________________________________________________________________________  Procedure:   An examination of the heart and lungs was performed and found to be within acceptable limits.  The patient was therefore deemed a reasonable candidate for endoscopy and monitored anesthesia care.   The risks, benefits and plan of the procedure were discussed with the patient and/or patient representative and all questions were answered.  After obtaining informed consent, the patient received monitored anesthesia care and I passed the scope   without difficulty via the rectum to the ileum.  The appendiceal orifice and ic valve were identified.  The scope was retroflexed during the examination  The quality of the prep was good  (Golytely).    This was a complete examination throughout the entire colon.    Findings:    Normal finding.  Location - ileum.    Diverticulosis.  Location: - descending colon - sigmoid.    Description:  mild.    Size:  small.    Quantity:  few.  No inflammation present.    Hemorrhoids.  Internal hemorrhoids without bleeding.    Impression:  Diverticulosis of colon  without diverticulitis   Hemorrhoids, internal   Screening Colonoscopy     Plan  Repeat colonoscopy in 5 years.    We will attempt to contact you at appropriate intervals via [place] mail.  We may not be able to find you or contact you at that time, therefore you should know that the responsibility for following our recommendation rests with you.  If you don't hear from us at the time your procedure is due, please contact our office to schedule an appointment.  If your contact information should change, please contact our office so that we can update your records.  Return to your primary care provider  as needed.    Orders    Instruction(s)/Education:  Instruction/Education Timeframe Assessment   Diverticulosis/Diverticulitis  K57.30   Hemorrhoids  K57.30           Electronically signed by:___________________  Bart Suggs MD                 05/01/2025    Medications:  Medication Dose Sig Description PRN Status PRN Reason Comments   Durlaza 162.5 mg capsule,extended release 162.5 mg take 1 capsule by oral route  every day at the same time each day N  taking as directed   Golytely 236 gram-22.74 gram-6.74 gram-5.86 gram oral solution 236 gram-22.74 gram-6.74 gram-5.86 gram-2.97 gram Take by oral route as directed per colonoscopy prep instructions from [facility] N  taking as directed   Plavix 75 mg tablet 75 mg take 1 tablet by oral route  every day N  taking as directed   polyethylene glycol 3350 17 gram oral powder packet 17 gram take 1 packet by oral route  every day mixed with 8 oz. water, juice, soda, coffee or tea N  taking as directed   rosuvastatin 10 mg tablet 10 mg take 1 tablet by oral route  every day N  taking as directed   Viagra UNKNOWN take 1 tablet by oral route  every day as needed approximately 1 hour before sexual activity N  taking as directed     Allergies:  Medication Name Ingredient Reaction Comment    NO KNOWN ALLERGIES       Vital Signs:  Date Time Systolic Diastolic Height Weight BMI   05/01/2025  10:33  78 70 in 199.99 28.70     Race:  White  Ethnicity:  Not  or   Preferred Language:  English      cc: Aki Bolaños MD        [facility] [phone]

## (undated) DEVICE — SU MONOCRYL 4-0 P-3 18" UND Y494G

## (undated) DEVICE — LINEN TOWEL PACK X5 5464

## (undated) DEVICE — SU MONOCRYL 3-0 PS-1 27" Y936H

## (undated) DEVICE — SOL NACL 0.9% IRRIG 500ML BOTTLE 2F7123

## (undated) DEVICE — PACK MINOR CUSTOM ASC

## (undated) DEVICE — DRSG TELFA 3X8" 1238

## (undated) DEVICE — SU PLAIN FAST ABSORB 6-0 PC-1 18" 1916G

## (undated) DEVICE — SU PLAIN FAST ABSORB 5-0 PC-1 18" 1915G

## (undated) DEVICE — SOL WATER IRRIG 500ML BOTTLE 2F7113

## (undated) DEVICE — SYR BULB IRRIG 50ML LATEX FREE 0035280

## (undated) DEVICE — SU PROLENE 4-0 PS-2 18" 8682G

## (undated) DEVICE — PREP SKIN SCRUB TRAY 4461A

## (undated) DEVICE — LABEL MEDICATION SYSTEM 3303-P

## (undated) DEVICE — EYE PREP BETADINE 5% SOLUTION 30ML 0065-0411-30

## (undated) DEVICE — ESU NDL COLORADO MICRO 3CM STR N103A

## (undated) DEVICE — GLOVE PROTEXIS MICRO 7.0  2D73PM70

## (undated) DEVICE — DRAPE U SPLIT 74X120" 29440

## (undated) DEVICE — DRSG KERLIX 4 1/2"X4YDS ROLL 6730

## (undated) DEVICE — DRSG KERLIX FLUFFS X5

## (undated) DEVICE — SPONGE RAY-TEC 4X8" 7318

## (undated) DEVICE — ESU GROUND PAD ADULT W/CORD E7507

## (undated) DEVICE — SU PROLENE 5-0 P-3 18" 8698G

## (undated) DEVICE — SUCTION MANIFOLD NEPTUNE 2 SYS 1 PORT 702-025-000

## (undated) DEVICE — DRSG GAUZE 4X4" TRAY 6939

## (undated) DEVICE — DRSG STERI STRIP 1/4X4" R1546

## (undated) DEVICE — BLADE KNIFE SURG 15 371115

## (undated) RX ORDER — PROPOFOL 10 MG/ML
INJECTION, EMULSION INTRAVENOUS
Status: DISPENSED
Start: 2019-10-23

## (undated) RX ORDER — DEXAMETHASONE SODIUM PHOSPHATE 10 MG/ML
INJECTION, SOLUTION INTRAMUSCULAR; INTRAVENOUS
Status: DISPENSED
Start: 2019-10-23

## (undated) RX ORDER — MUPIROCIN 20 MG/G
OINTMENT TOPICAL
Status: DISPENSED
Start: 2019-10-23

## (undated) RX ORDER — FENTANYL CITRATE 50 UG/ML
INJECTION, SOLUTION INTRAMUSCULAR; INTRAVENOUS
Status: DISPENSED
Start: 2019-10-23

## (undated) RX ORDER — LIDOCAINE HYDROCHLORIDE 20 MG/ML
INJECTION, SOLUTION EPIDURAL; INFILTRATION; INTRACAUDAL; PERINEURAL
Status: DISPENSED
Start: 2019-10-23

## (undated) RX ORDER — CEFAZOLIN SODIUM 1 G/3ML
INJECTION, POWDER, FOR SOLUTION INTRAMUSCULAR; INTRAVENOUS
Status: DISPENSED
Start: 2019-10-23

## (undated) RX ORDER — HYDROMORPHONE HYDROCHLORIDE 1 MG/ML
INJECTION, SOLUTION INTRAMUSCULAR; INTRAVENOUS; SUBCUTANEOUS
Status: DISPENSED
Start: 2019-10-23

## (undated) RX ORDER — ONDANSETRON 2 MG/ML
INJECTION INTRAMUSCULAR; INTRAVENOUS
Status: DISPENSED
Start: 2019-10-23

## (undated) RX ORDER — REMIFENTANIL HYDROCHLORIDE 1 MG/ML
INJECTION, POWDER, LYOPHILIZED, FOR SOLUTION INTRAVENOUS
Status: DISPENSED
Start: 2019-10-23

## (undated) RX ORDER — ACETAMINOPHEN 325 MG/1
TABLET ORAL
Status: DISPENSED
Start: 2019-10-23

## (undated) RX ORDER — KETOROLAC TROMETHAMINE 30 MG/ML
INJECTION, SOLUTION INTRAMUSCULAR; INTRAVENOUS
Status: DISPENSED
Start: 2019-10-23

## (undated) RX ORDER — DEXAMETHASONE SODIUM PHOSPHATE 4 MG/ML
INJECTION, SOLUTION INTRA-ARTICULAR; INTRALESIONAL; INTRAMUSCULAR; INTRAVENOUS; SOFT TISSUE
Status: DISPENSED
Start: 2019-10-23

## (undated) RX ORDER — GLYCOPYRROLATE 0.2 MG/ML
INJECTION INTRAMUSCULAR; INTRAVENOUS
Status: DISPENSED
Start: 2019-10-23

## (undated) RX ORDER — GABAPENTIN 300 MG/1
CAPSULE ORAL
Status: DISPENSED
Start: 2019-10-23